# Patient Record
Sex: MALE | Race: WHITE | NOT HISPANIC OR LATINO | Employment: UNEMPLOYED | URBAN - METROPOLITAN AREA
[De-identification: names, ages, dates, MRNs, and addresses within clinical notes are randomized per-mention and may not be internally consistent; named-entity substitution may affect disease eponyms.]

---

## 2017-06-13 ENCOUNTER — ALLSCRIPTS OFFICE VISIT (OUTPATIENT)
Dept: OTHER | Facility: OTHER | Age: 62
End: 2017-06-13

## 2017-06-13 DIAGNOSIS — E78.5 HYPERLIPIDEMIA: ICD-10-CM

## 2017-06-13 DIAGNOSIS — Z00.00 ENCOUNTER FOR GENERAL ADULT MEDICAL EXAMINATION WITHOUT ABNORMAL FINDINGS: ICD-10-CM

## 2017-11-16 ENCOUNTER — ALLSCRIPTS OFFICE VISIT (OUTPATIENT)
Dept: OTHER | Facility: OTHER | Age: 62
End: 2017-11-16

## 2017-11-18 NOTE — PROGRESS NOTES
Assessment    1  BPH associated with nocturia (600 01,788 43) (N40 1,R35 1)   2  Hematuria (599 70) (R31 9)   3  Ureterocele (593 89) (N28 89)   4  Encounter for prostate cancer screening (V76 44) (Z12 5)    Plan  Encounter for prostate cancer screening, Ureterocele    · (1) PSA (SCREEN) (Dx V76 44 Screen for Prostate Cancer); Status:Active; Requestedfor:52Yrw0891; Perform:[address]; DFI:[id number];KXAGRVO;QMT:LLIRAZSIGINO for prostate cancer screening, Ureterocele; Ordered By:Marizol Soto;  Ureterocele    · (1) [place]; Status:Active; Requested for:09Rdg8655; Perform:Merged with [facility]; NOO:70VLB7317;KOQZUZI;NQWDIEG By:Darrian Soto;   · US KIDNEY AND BLADDER WITH PVR; Status:Active - Retrospective Authorization; Requested for:Before [id number]; Perform:Merged with [facility]; DFR:[id number]; Last Updated By:Darrian Soto; 11/16/2017 2:09:23 PM;Ordered;Ordered By:Darrian Soto;   · Follow-up visit in 3 months Evaluation and Treatment  Follow-up  Status: Hold For -Scheduling,Retrospective Authorization  Requested for: [id number]   Ordered;Ureterocele; Ordered By: Austin Joseph Performed:  Due: 74AJY5700; Last Updated By: Austin Joseph; 11/16/2017 2:09:23 PM    Discussion/Summary  Discussion Summary:   1  Ureterocele s/p resectionNo recent imaging  Plan will be to obtain an ultrasound and kidney and bladder with PVR and BMP  BPH with lower urinary tract symptomscontinue finasteridethe patient his urinary symptoms become more bothersome to him options would include urolift versus TURP since he cannot tolerate alpha blockers  He is comfortable with this urine at this time and wishes to continue finasteride without further intervention  routine prostate cancer screeningPatient will obtain a PSA in the near future and will be due for OSEAS follow-up examination        Chief Complaint  Chief Complaint Free Text Note Form: Patient presents for BPH, hematuria, nephrolithiasis, ureterocele      History of Present Illness  HPI: Kevin Marr is a 72-year-old male patient of Dr Luz Maria Simons with a history of ureterocele status post resection and BPH presenting for follow up   had imaging revealing left hydroureteronephrosis and what appeared to be a ureterocele within the urinary bladder  He underwent a transurethral resection of the ureterocele by Dr Alfredo Barbosa with a ureteral stent placement (9/22/15)  His stent was removed after that  Cystoscopy had been notable for large prostate with lateral lobe hyperplasia  Patient was intolerant to tamsulosin for lower urinary tract symptoms given hypotension, thereby has been managed on finasteride for at least the past 2 years for management of urinary symptoms  Patient had postoperative imaging which was negative for any residual hydronephrosis  has not had any recent imaging of his kidney or bladder  He denies any flank pain, gross hematuria, dysuria, nausea, vomiting, fevers, or chills  feels like is a fair stream, feels empty after urination, and has nocturia 3-4 times nightly  He does notice some mild urgency and hesitancy  He denies any hematuria, dysuria, incontinence  Patient does notice that caffeine exacerbates his nocturia  Overall he is not bothered by his urinary symptoms at this time  He denies any urinary tract infections  Review of Systems  Complete-Male Urology:  Constitutional: No fever or chills, feels well, no tiredness, no recent weight gain or weight loss  Respiratory: No complaints of shortness of breath, no wheezing, no cough, no SOB on exertion, no orthopnea or PND  Cardiovascular: No complaints of slow heart rate, no fast heart rate, no chest pain, no palpitations, no leg claudication, no lower extremity  Gastrointestinal: No complaints of abdominal pain, no constipation, no nausea or vomiting, no diarrhea or bloody stools    Genitourinary: urinary hesitancy,-- Empty sensation,-- feelings of urinary urgency-- and-- stream quality fair, but-- no dysuria,-- no hematuria-- and-- no incontinence--   The patient presents with complaints of nocturia (3-4 times)  Musculoskeletal: No complaints of arthralgia, no myalgias, no joint swelling or stiffness, no limb pain or swelling  Integumentary: No complaints of skin rash or skin lesions, no itching, no skin wound, no dry skin  Hematologic/Lymphatic: No complaints of swollen glands, no swollen glands in the neck, does not bleed easily, no easy bruising  Neurological: No compliants of headache, no confusion, no convulsions, no numbness or tingling, no dizziness or fainting, no limb weakness, no difficulty walking  ROS Reviewed:   ROS reviewed  Active Problems  1  Anemia (285 9) (D64 9)   2  Arteriosclerosis of both carotid arteries (433 10,433 30) (I65 23)   3  BPH associated with nocturia (600 01,788 43) (N40 1,R35 1)   4  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)   5  COPD (chronic obstructive pulmonary disease) with emphysema (492 8) (J43 9)   6  Former smoker (V15 82) (S73 777)   7  Hematuria (599 70) (R31 9)   8  Hemorrhoids (455 6) (K64 9)   9  Hydronephrosis (591) (N13 30)   10  Hyperlipidemia (272 4) (E78 5)   11  Hypokalemia (276 8) (E87 6)   12  Inguinal hernia (550 90) (K40 90)   13  Nephrolithiasis (592 0) (N20 0)   14  Paroxysmal atrial fibrillation (427 31) (I48 0)   15  Shortness of breath (786 05) (R06 02)   16  Tobacco abuse (305 1) (Z72 0)   17  Ureterocele (593 89) (N28 89)   18  Ureterocele, congenital (753 23) (Q62 31)    Past Medical History  1  History of Hernia (553 9) (K46 9)   2  History of constipation (V12 79) (Z87 19)   3  History of renal calculi (V13 01) (Z87 442)   4  History of Lung nodule (793 11) (R91 1)   5  History of Sexual dysfunction (302 70) (R37)  Active Problems And Past Medical History Reviewed: The active problems and past medical history were reviewed and updated today  Surgical History  1   History of Closed Treatment Of Orbital Fracture (Non-'blowout')   2  History of Facial Surgery   3  History of Surgery Vas Deferens Vasectomy   4  History of Tonsillectomy  Surgical History Reviewed: The surgical history was reviewed and updated today  Family History  Mother    1  Family history of cerebrovascular accident (V17 1) (Z82 3)   2  Family history of diabetes mellitus (V18 0) (Z83 3)   3  Family history of hypertension (V17 49) (Z82 49)   4  Family history of skin cancer (V16 8) (Z80 8)   5  Family history of Heart problem  Father    6  Family history of   Sister    9  Family history of scoliosis (V17 89) (Z82 69)  Family History Reviewed: The family history was reviewed and updated today  Social History     · Denied: History of Alcohol use   · Current every day smoker (305 1) (F17 200)   · Former smoker (C63 54) (Q43 136)   ·   Social History Reviewed: The social history was reviewed and is unchanged  Current Meds   1  Aspirin 81 MG TABS; Take 1 tablet daily Recorded   2  Finasteride 5 MG Oral Tablet; Take 1 tablet daily; Therapy: 50IZA0020 to (Evaluate:69Ysq4740)  Requested for: 67DLL8657; Last Rx:35Fuo0801 Ordered   3  Multi-Vitamins TABS; Therapy: (Recorded:47Brk7618) to Recorded  Medication List Reviewed: The medication list was reviewed and updated today  Allergies  1  No Known Drug Allergies  2  Shellfish    Vitals  Vital Signs    Recorded: 56MXV0588 01:43PM   Heart Rate 72   Systolic 569   Diastolic 68   Height 5 ft 10 in   Weight 142 lb 8 oz   BMI Calculated 20 45   BSA Calculated 1 81       Physical Exam   Constitutional  General appearance: No acute distress, well appearing and well nourished  Pulmonary  Respiratory effort: No increased work of breathing or signs of respiratory distress     Cardiovascular  Examination of extremities for edema and/or varicosities: Normal    Musculoskeletal  Gait and station: Normal    Skin  Skin and subcutaneous tissue: Normal without rashes or lesions  Additional Exam:  no focal neurologic deficits  Mood and affect appropriate  Results/Data  AUA Symptom Score 80LGS0486 01:45PM User, Ahs     Test Name Result Flag Reference   AUA Symptom Score (for prostate disease) 7       Incomplete emptying: Not at all (0) Frequency: Less than 1 time in 5 (1) Intermittency: Not at all (0) Urgency: Less than 1 time in 5 (1) Weak-stream: Not at all (0) Straining: Less than 1 time in 5 (1) Nocturia: More than half the time (4)   AUA Symptom Score (for prostate disease) - Quality of Life Due to Urinary Symptoms Pleased     AUA Symptom Score (for prostate disease) - Score Category Mild         Future Appointments    Date/Time Provider Specialty Site   02/21/2018 01:00 PM [address] [place] [address] AT [facility] Urology [facility]OLOG[facility]   12/05/2017 12:00 PM DELFINA Parkinson   Cardiology [facility] CARDIOLOGY [facility]   Electronically signed by : Emiliano Tavarez, ; Nov 16 2017  3:42PM EST                       (Author)    Electronically signed by : DELFINA Simon ; Nov 17 2017  6:45AM EST                       (Author)

## 2017-12-05 ENCOUNTER — ALLSCRIPTS OFFICE VISIT (OUTPATIENT)
Dept: OTHER | Facility: OTHER | Age: 62
End: 2017-12-05

## 2017-12-06 NOTE — PROGRESS NOTES
Assessment  Assessed    1  Paroxysmal atrial fibrillation (427 31) (I48 0)   2  Hyperlipidemia (272 4) (E78 5)   3  Tobacco abuse (305 1) (Z72 0)   4  Arteriosclerosis of both carotid arteries (433 10,433 30) (T69 52)    Discussion/Summary  Cardiology Discussion Summary Free Text Note Form [facility]:   PAF - I have had a detailed discussion with the pt and his spouse about atrial fibrillation, its causes, rate versus rhythm control, stroke vs bleeding risk, Coumadin versus newer anticoagulants etc  Multiple questions were answered  Pt's CHADS2 score is 0 and he is on ASA 81 mg daily  No rate control meds was started as patient is occasionally bradycardic at baseline and also because his episodes of PAF were all with controlled ventricular response and also he tends to have low BP as well  u/s (Dec 2016) - <50% stenosisasked to start aspirin and statin  He started aspirin but not statin  LDL not at goal  Again asked him to start statin   he refused stating he will do tighter diet control first   - wants to try diet control for a longer period  risk of MI, CVA etc again discussed  counseled to stop smoking  monitoring (Nov 2015) - heart rate was within 61-68 bpm  No evidence of bradycardia or sinus pauses or A fib was seen  aggressive risk factor modification and therapeutic lifestyle changes  Low salt, low calorie, low fat, low cholesterol diet with regular exercise and to optimize weight  I will defer the ordering and monitoring of necessary lab studies to you, but I am available and happy to review and manage any of that data at your request in the future  concepts of atherosclerosis, including signs and symptoms of cardiac disease  studies were reviewed  measures were reviewed  were entertained and answered  was advised to report any problem requiring medical attention  up with appropriate specialists and lab work as discussed  for follow up visit as scheduled or earlier, if needed  you for allowing me to participate in the care and evaluation of your patient  Should you have any questions, please feel free to contact me  Goals and Barriers: The patient has the current Goals: Stop smokinglipid panel to goal  The patent has the current Barriers: Smoking  Patient's Capacity to Self-Care: Patient is able to Self-Care  Medication SE Review and Pt Understands Tx: Possible side effects of new medications were reviewed with the patient/guardian today  The treatment plan was reviewed with the patient/guardian  The patient/guardian understands and agrees with the treatment plan    Counseling Documentation With Imm: The patient was counseled regarding diagnostic results,-- instructions for management,-- risk factor reductions,-- patient and family education,-- risks and benefits of treatment options  Chief Complaint  Chief Complaint Chronic Condition [facility]: Patient is here today for follow up of chronic conditions described in HPI  History of Present Illness  Cardiology HPI Free Text Note Form [facility]: Patient comes for follow-up and states that he is doing well cardiac wise  He denies chest pain/pressure/tightness, palpitations, lightheadedness, syncope, swelling feet, orthopnea, PND, claudication  States always has sob as he has COPD and he continues to smoke  his atrial fibrillation he is on aspirin 81 mg daily  No rate control meds was started as patient is occasionally bradycardic at baseline and also because his episodes of PAF were all with controlled ventricular response and also he tends to have low BP as well  his HLP and carotid arteriosclerosis, he was started on statin but patient stopped taking it and states he is trying to control his diet first prior to starting a statin  Latest LDL at 138  Patient asked to resume a statin but he refused as he states he still wants to work on his diet first  - On September 13, 2015 patient went to [facility] for nausea vomiting and abdominal pain due to nephrolithiasis   There he was found to be in a self limiting episode of atrial fibrillation  Later that month he was admitted again to [facility] but no atrial fibrillation was found  In October 2015 he went to [address] for the same renal colic and at that time he had an episode of atrial fibrillation  In October 2015 he had an episode of dizziness followed by syncope at his home  He was admitted at [facility] and it was thought that the syncope was secondary to dehydration  echocardiogram August 2015 - normal LVEF, no significant valvular pathologynuclear stress test October 2015 - showed no evidence of reversible coronary ischemiaduplex October 2015 - B/L atherosclerotic plaquing with < 50% stenosis      Review of Systems  Cardiology Male ROS:    Cardiac: No complaints of chest pain, no palpitations, no fainiting  Skin: No complaints of nonhealing sores or skin rash  Genitourinary: No complaints of recurrent urinary tract infections, frequent urination at night, difficult urination, blood in urine, kidney stones, loss of bladder control, no kidney or prostate problems, no erectile dysfunction  Psychological: No complaints of feeling depressed, anxiety, panic attacks, or difficulty concentrating  General: No complaints of trouble sleeping, lack of energy, fatigue, appetite changes, weight changes, fever, frequent infections, or night sweats  Respiratory: as noted in HPI  HEENT: No complaints of serious problems, hearing problems, nose problems, throat problems, or snoring  Gastrointestinal: No complaints of liver problems, nausea, vomiting, heartburn, constipation, bloody stools, diarrhea, problems swallowing, adbominal pain, or rectal bleeding  Hematologic: No complaints of bleeding disorders, anemia, blood clots, or excessive brusing  Neurological: No complaints of numbness, tingling, dizziness, weakness, seizures, headaches, syncope or fainting, AM fatigue, daytime sleepiness, no witnessed apnea episodes  Musculoskeletal: No complaints of arthritis, back pain, or painfull swelling  ROS Reviewed:   ROS reviewed  Active Problems  Problems    1  Anemia (285 9) (D64 9)   2  Arteriosclerosis of both carotid arteries (433 10,433 30) (I65 23)   3  BPH associated with nocturia (600 01,788 43) (N40 1,R35 1)   4  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)   5  COPD (chronic obstructive pulmonary disease) with emphysema (492 8) (J43 9)   6  Encounter for prostate cancer screening (V76 44) (Z12 5)   7  Former smoker (V15 82) (Z88 069)   8  Hematuria (599 70) (R31 9)   9  Hemorrhoids (455 6) (K64 9)   10  Hydronephrosis (591) (N13 30)   11  Hyperlipidemia (272 4) (E78 5)   12  Hypokalemia (276 8) (E87 6)   13  Inguinal hernia (550 90) (K40 90)   14  Nephrolithiasis (592 0) (N20 0)   15  Paroxysmal atrial fibrillation (427 31) (I48 0)   16  Shortness of breath (786 05) (R06 02)   17  Tobacco abuse (305 1) (Z72 0)   18  Ureterocele (593 89) (N28 89)   19  Ureterocele, congenital (753 23) (Q62 31)    Past Medical History  Problems    1  History of Hernia (553 9) (K46 9)   2  History of constipation (V12 79) (Z87 19)   3  History of renal calculi (V13 01) (Z87 442)   4  History of Lung nodule (793 11) (R91 1)   5  History of Sexual dysfunction (302 70) (R37)  Active Problems And Past Medical History Reviewed: The active problems and past medical history were reviewed and updated today  Surgical History  Problems    1  History of Closed Treatment Of Orbital Fracture (Non-'blowout')   2  History of Facial Surgery   3  History of Surgery Vas Deferens Vasectomy   4  History of Tonsillectomy  Surgical History Reviewed: The surgical history was reviewed and updated today  Family History  Mother    1  Family history of cerebrovascular accident (V17 1) (Z82 3)   2  Family history of diabetes mellitus (V18 0) (Z83 3)   3  Family history of hypertension (V17 49) (Z82 49)   4  Family history of skin cancer (V16 8) (Z80 8)   5  Family history of Heart problem  Father    6  Family history of   Sister    9  Family history of scoliosis (V17 89) (Z82 69)  Family History Reviewed: The family history was reviewed and updated today  Social History  Problems    · Denied: History of Alcohol use   · Current every day smoker (305 1) (F17 200)   · Former smoker (A24 59) (A44 180)   ·   Social History Reviewed: The social history was reviewed and updated today  The social history was reviewed and is unchanged  Current Meds   1  Aspirin 81 MG TABS; Take 1 tablet daily Recorded   2  Finasteride 5 MG Oral Tablet; Take 1 tablet daily; Therapy: 85TMB2345 to (Evaluate:74Rwh7607)  Requested for: 98HXB2043; Last Rx:39Nrr1523 Ordered   3  Multi-Vitamins TABS; Therapy: (Recorded:41Zdr3451) to Recorded  Medication List Reviewed: The medication list was reviewed and updated today  Allergies  Medication    1  No Known Drug Allergies  Non-Medication    2  Shellfish    Vitals  Vital Signs    Recorded: 86IAJ9961 11:53AM   Heart Rate 81   Systolic 028   Diastolic 68   Height 5 ft 10 in   Weight 141 lb    BMI Calculated 20 23   BSA Calculated 1 8   O2 Saturation 97       Physical Exam   Constitutional  General appearance: No acute distress, well appearing and well nourished  Eyes  Conjunctiva and Sclera examination: Conjunctiva pink, sclera anicteric  Ears, Nose, Mouth, and Throat - External inspection of ears and nose: Normal without deformities or discharge  -- Oropharynx: Clear, nares are clear, mucous membranes are moist   Neck  Neck and thyroid: Normal, supple, trachea midline, no thyromegaly  Pulmonary  Respiratory effort: No increased work of breathing or signs of respiratory distress  Auscultation of lungs: Clear to auscultation, no rales, no rhonchi, no wheezing, good air movement  Cardiovascular  Palpation of heart: Normal PMI, no thrills  Auscultation of heart: Normal rate and rhythm, normal S1 and S2, no murmurs  Carotid pulses: Normal, 2+ bilaterally  Examination of extremities for edema and/or varicosities: Normal    Chest - Chest: Normal   Abdomen  Abdomen: Non-tender and no distention  Liver and spleen: No hepatomegaly or splenomegaly  Musculoskeletal Gait and station: Normal gait  -- Digits and nails: Normal without clubbing or cyanosis  -- Inspection/palpation of joints, bones, and muscles: Normal, ROM normal    Skin - Skin and subcutaneous tissue: Normal without rashes or lesions  Skin is warm and well perfused, normal turgor     Neurologic - Speech: Normal    Psychiatric - Orientation to person, place, and time: Normal -- Mood and affect: Normal       Future Appointments    Date/Time Provider Specialty Site   02/21/2018 01:00 PM [facility] Urology [address] [place] [address]       Signatures   Electronically signed by : DELFINA Vanegas ; Dec  5 2017 12:31PM EST                       (Author)

## 2017-12-11 ENCOUNTER — TRANSCRIBE ORDERS (OUTPATIENT)
Dept: ADMINISTRATIVE | Facility: HOSPITAL | Age: 62
End: 2017-12-11

## 2017-12-11 DIAGNOSIS — N28.89 URETERAL FISTULA: Primary | ICD-10-CM

## 2017-12-21 ENCOUNTER — GENERIC CONVERSION - ENCOUNTER (OUTPATIENT)
Dept: INTERNAL MEDICINE CLINIC | Facility: CLINIC | Age: 62
End: 2017-12-21

## 2018-01-13 VITALS
DIASTOLIC BLOOD PRESSURE: 62 MMHG | HEIGHT: 70 IN | BODY MASS INDEX: 20.19 KG/M2 | SYSTOLIC BLOOD PRESSURE: 104 MMHG | OXYGEN SATURATION: 97 % | HEART RATE: 70 BPM | WEIGHT: 141 LBS

## 2018-01-13 VITALS
HEIGHT: 70 IN | DIASTOLIC BLOOD PRESSURE: 68 MMHG | SYSTOLIC BLOOD PRESSURE: 102 MMHG | BODY MASS INDEX: 20.4 KG/M2 | WEIGHT: 142.5 LBS | HEART RATE: 72 BPM

## 2018-01-13 NOTE — RESULT NOTES
PFT Results v2:   Diagnosis/Reason For Study: COPD with emphysema   Referring Provider: Jacqueline Anton   Spirometry: Forced vital capacity: 5 74L and 119% Predicted Values  Forced expiratory volume in one second: 4 61L and 126% Predicted Value  FEV1/FVC ratio is 105% Predicted Values  Post Bronchodilator Spirometry: Forced vital capacity : 5 47L and 114% Predicted Values  Forced expiratory volume in one second : 4 64L and 127% Predicted Value  FEV1/FVC ratio is 111% Predicted Values  Lung Volumes: Total lung capacity : 7 81L and 111% Predicted Values  RV: 83% Predicted Values  RV/T% Predicted Values  DLCO:   DLCO 91% Predicted Values  PFT Interpretation:   Patient had a full lung function testing with spirometry lung volumes and DLCO  Patient gave a good effort  The flow volume curve is normal   There is no obstructive or restrictive ventilatory limitation  The lung volumes and DLCO are normal   Clinical correlation required  Future Appointments    Date/Time Provider Specialty Site   2016 02:45 PM DELFINA Collier  Pulmonary [address]   2016 02:00 PM DELFINA Harry   Cardiology [facility] CARDIOLOGY [facility]      Electronically signed by : DELFINA Prado ; 2016  5:05PM EST                       (Author)

## 2018-01-17 NOTE — PROGRESS NOTES
Assessment    1  COPD (chronic obstructive pulmonary disease) with emphysema (492 8) (J43 9)   2  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)   3  Former smoker (V15 82) (W10 288)   4  Shortness of breath (786 05) (R06 02)    Plan  COPD (chronic obstructive pulmonary disease) with emphysema    · Follow-up visit in 6 months Evaluation and Treatment  Follow-up  Status: Hold For -  Scheduling  Requested for: 27Jan2016   Ordered; For: COPD (chronic obstructive pulmonary disease) with emphysema;  Ordered By: Elli Reilly Performed:  Due: [id number]    Discussion/Summary  Discussion Summary:   Patient is a very pleasant 64-year-old gentleman with greater than 35 pack is smoking history still actively smoking until a month ago, states he has only smoked a few cigarettes since then, who was in the hospital at [facility] in October 2015 for a urethral stent that was placed, when he had a CT of the abdomen done, which showed a 7 mm lung nodule in the left lower lobe for which he was referred  He does not complaining of shortness of breath or dyspnea on exertion, able to do his daily activities as usual  Has cough with white mucoid productive sputum no hemoptysis  Works in maintenance, 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  Recently diagnosed with intermittent A  fib not on any medications  He is here for followup with a CT of the chest and PFT  CT of the chest report and images discussed with the patient with evidence of bilateral emphysema  Also has a right apical bullous emphysema  The left lower lobe 7 mm lung nodule has completely resolved possibly inflammatory  There are no other nodules or any spiculated masses  Etiology pathogenesis of emphysema discussed with the patient  Also discussed the risk for the bullous emphysema for a pneumothorax  We'll followup with a CT of the chest in one year for a CT of the chest for lung cancer screening as well as for following up with the size of the bullous emphysema, also given his prior exposure to asbestos, and 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 of the chest in one year  PFT with bronchodilators lung volumes and DLCO report discussed with the patient with no evidence of any obstructive or restrictive ventilatory limitation with normal DLCO  Currently need for bronchodilators, given no shortness of breath or dyspnea on exertion, no limitation of any current activities  He will call if that is any worsening symptoms  Vaccinations up-to-date  We'll followup in 6 months or when necessary earlier as needed  Medication SE Review and Pt Understands Tx: Possible side effects of new medications were reviewed with the patient/guardian today  The treatment plan was reviewed with the patient/guardian  The patient/guardian understands and agrees with the treatment plan   Counseling Documentation With Imm: The patient was counseled regarding diagnostic results, instructions for management, risk factor reductions, risks and benefits of treatment options, importance of compliance with treatment  Active Problems    1  Anemia (285 9) (D64 9)   2  BPH associated with nocturia (600 01,788 43) (N40 1,R35 1)   3  COPD (chronic obstructive pulmonary disease) with emphysema (492 8) (J43 9)   4  Hematuria (599 70) (R31 9)   5  Hemorrhoids (455 6) (K64 9)   6  Hydronephrosis (591) (N13 30)   7  Hypokalemia (276 8) (E87 6)   8  Inguinal hernia (550 90) (K40 90)   9  Nephrolithiasis (592 0) (N20 0)   10  Paroxysmal atrial fibrillation (427 31) (I48 0)   11  Shortness of breath (786 05) (R06 02)   12  Ureterocele (593 89) (N28 89)   13   Ureterocele, congenital (753 23) (Q62 31)    Chief Complaint  Chief Complaint Free Text Note Form: Patient is here for followup      History of Present Illness  HPI: Patient is a very pleasant 20-year-old gentleman with greater than 35 pack is smoking history still actively smoking until a month ago, states he has only smoked a few cigarettes since then, who was in the hospital at 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  Recently diagnosed with intermittent A  fib not on any medications  He is here for followup with a CT of the chest and PFT      Review of Systems  Complete-Male Pulm:   Constitutional: No fever or chills, feels well, no tiredness, no recent weight gain or weight loss, no fever, not feeling poorly, no recent weight gain, no chills, not feeling tired and no recent weight loss  Eyes: no complaints of vision problems  ENT: no rhinitis, no PND, no epistaxis  Cardiovascular: no palpitations, no chest pain  Respiratory: shortness of breath during exertion, but as noted in HPI, no shortness of breath, no cough, no orthopnea, no wheezing and no PND  Gastrointestinal: no complaints of esophageal reflux, no abdominal pain  Musculoskeletal: no arthralgias, no joint swelling, no myalgias  Endocrine: Negative  Hematologic/Lymphatic: no complaints of swollen glands  Past Medical History    1  History of Hernia (553 9) (K46 9)   2  History of constipation (V12 79) (Z87 19)   3  History of renal calculi (V13 01) (Z87 442)   4  History of Lung nodule (793 11) (R91 1)   5  History of Sexual dysfunction (302 70) (R37)   6  History of Tobacco abuse (305 1) (Z72 0)    Surgical History    1  History of Closed Treatment Of Orbital Fracture (Non-'blowout')   2  History of Facial Surgery   3  History of Surgery Vas Deferens Vasectomy   4  History of Tonsillectomy  Surgical History Reviewed: The surgical history was reviewed and updated today  Family History    1   Family history of cerebrovascular accident (V17 1) (Z82 3)   2  Family history of diabetes mellitus (V18 0) (Z83 3)   3  Family history of hypertension (V17 49) (Z82 49)   4  Family history of skin cancer (V16 8) (Z80 8)   5  Family history of Heart problem    6  Family history of     7  Family history of scoliosis (V17 89) (Z82 69)  Family History Reviewed: The family history was reviewed and updated today  Social History    · Denied: History of Alcohol use   · Current every day smoker (305 1) (F17 200)   · 1PPD   · Former smoker (K21 82) (T66 466)   ·   Social History Reviewed: The social history was reviewed and updated today  The social history was reviewed and is unchanged  Current Meds   1  Aspirin 81 MG Oral Tablet; Take 1 tablet daily Recorded   2  Colace 100 MG Oral Capsule; TAKE 1 CAPSULE Daily Recorded   3  Finasteride 5 MG Oral Tablet; TAKE 1 TABLET DAILY AS DIRECTED; Therapy: [id number] to (Evaluate:2016)  Requested for: 20ODI3715; Last   Rx:2015 Ordered   4  Multi-Vitamins TABS; Therapy: (Recorded:40Cep0403) to Recorded  Medication List Reviewed: The medication list was reviewed and updated today  Allergies    1  No Known Drug Allergies    2  Shellfish    Vitals  Vital Signs [Data Includes: Current Encounter]    Recorded: 81WEF7901 02:52PM   Temperature 97 9 F   Heart Rate 56   Systolic 117   Diastolic 74   Height 5 ft 9 5 in   Weight 148 lb 2 08 oz   BMI Calculated 21 56   BSA Calculated 1 83   O2 Saturation 96     Physical Exam    Constitutional   General appearance: No acute distress, well appearing and well nourished  Ears, Nose, Mouth, and Throat   External inspection of ears and nose: Normal     Nasal mucosa, septum, and turbinates: Normal without edema or erythema  Oropharynx: Normal with no erythema, edema, exudate or lesions  Neck   Neck: Supple, symmetric, trachea midline, no masses      Jugular veins: Normal     Pulmonary   Chest: Normal     Respiratory effort: No increased work of breathing or signs of respiratory distress  Auscultation of lungs: Clear to auscultation, no rales, no crackles, no wheezing  Cardiovascular   Auscultation of heart: Normal rate and rhythm, normal S1 and S2, no murmurs  Examination of extremities for edema and/or varicosities: Normal     Abdomen   Abdomen: Soft, non-tender  Lymphatic   Palpation of lymph nodes in neck: No lymphadenopathy  Musculoskeletal   Gait and station: Normal     Neurologic   Mental Status: Normal  Not confused, no evidence of dementia, good comprehension, good concentration  Skin   Skin and subcutaneous tissue: Limited exam shows no rash  Psychiatric   Orientation to person, place and time: Normal        Future Appointments    Date/Time Provider Specialty Site   07/27/2016 02:45 PM DELFINA Lucero  Pulmonary [address]   06/01/2016 02:00 PM DELFINA Wheatley   Cardiology [facility] CARDIOLOGY [facility]     Signatures   Electronically signed by : DELFINA Tesfaye ; Jan 27 2016  4:05PM EST                       (Author)

## 2018-01-23 VITALS
OXYGEN SATURATION: 97 % | WEIGHT: 141 LBS | DIASTOLIC BLOOD PRESSURE: 68 MMHG | SYSTOLIC BLOOD PRESSURE: 110 MMHG | BODY MASS INDEX: 20.19 KG/M2 | HEART RATE: 81 BPM | HEIGHT: 70 IN

## 2018-02-06 ENCOUNTER — APPOINTMENT (OUTPATIENT)
Dept: LAB | Facility: HOSPITAL | Age: 63
End: 2018-02-06
Payer: COMMERCIAL

## 2018-02-06 ENCOUNTER — HOSPITAL ENCOUNTER (OUTPATIENT)
Dept: ULTRASOUND IMAGING | Facility: HOSPITAL | Age: 63
Discharge: HOME | End: 2018-02-06
Attending: UROLOGY
Payer: COMMERCIAL

## 2018-02-06 DIAGNOSIS — N28.89 URETERAL FISTULA: ICD-10-CM

## 2018-02-06 DIAGNOSIS — Z12.5 ENCOUNTER FOR SCREENING FOR MALIGNANT NEOPLASM OF PROSTATE: ICD-10-CM

## 2018-02-06 DIAGNOSIS — N28.89 OTHER SPECIFIED DISORDERS OF KIDNEY AND URETER: ICD-10-CM

## 2018-02-06 LAB
ANION GAP SERPL CALCULATED.3IONS-SCNC: 9 MMOL/L (ref 4–13)
BUN SERPL-MCNC: 20 MG/DL (ref 5–25)
CALCIUM SERPL-MCNC: 9 MG/DL (ref 8.3–10.1)
CHLORIDE SERPL-SCNC: 103 MMOL/L (ref 100–108)
CO2 SERPL-SCNC: 29 MMOL/L (ref 21–32)
CREAT SERPL-MCNC: 0.87 MG/DL (ref 0.6–1.3)
GFR SERPL CREATININE-BSD FRML MDRD: 93 ML/MIN/1.73SQ M
GLUCOSE P FAST SERPL-MCNC: 99 MG/DL (ref 65–99)
POTASSIUM SERPL-SCNC: 4 MMOL/L (ref 3.5–5.3)
SODIUM SERPL-SCNC: 141 MMOL/L (ref 136–145)

## 2018-02-06 PROCEDURE — 36415 COLL VENOUS BLD VENIPUNCTURE: CPT

## 2018-02-06 PROCEDURE — 80048 BASIC METABOLIC PNL TOTAL CA: CPT

## 2018-02-06 PROCEDURE — 51798 US URINE CAPACITY MEASURE: CPT

## 2018-02-21 ENCOUNTER — OFFICE VISIT (OUTPATIENT)
Dept: UROLOGY | Facility: CLINIC | Age: 63
End: 2018-02-21
Payer: COMMERCIAL

## 2018-02-21 VITALS
BODY MASS INDEX: 21.03 KG/M2 | SYSTOLIC BLOOD PRESSURE: 104 MMHG | DIASTOLIC BLOOD PRESSURE: 64 MMHG | HEIGHT: 69 IN | WEIGHT: 142 LBS | HEART RATE: 66 BPM

## 2018-02-21 DIAGNOSIS — N28.89 URETEROCELE: Primary | ICD-10-CM

## 2018-02-21 PROCEDURE — 99213 OFFICE O/P EST LOW 20 MIN: CPT | Performed by: PHYSICIAN ASSISTANT

## 2018-02-21 RX ORDER — FINASTERIDE 5 MG/1
5 TABLET, FILM COATED ORAL DAILY
COMMUNITY
End: 2018-04-27 | Stop reason: SDUPTHER

## 2018-02-21 NOTE — PROGRESS NOTES
1  Ureterocele             Assessment and plan:       1  Left ureterocele s/p transurethral resection of ureterocele 9/2015 -- managed by Dr Diego Levin  - no hydronephrosis present  No evidence of postrenal distraction  - no further surveillance warranted at this time  Patient can be discharged back to his primary care provider  He can follow up with urology on is an as-needed basis  He is aware to contact us in the future if any urologic concern  2  BPH with LUTS  - symptoms managed well on finasteride  He will continue to take this medication once daily  Yolis Casarez PA-C      Chief Complaint     Chief Complaint   Patient presents with    Benign Prostatic Hypertrophy    Hematuria         History of Present Illness     Shannon Holley is a 65 y o  male patient of Dr Diego Levin with a history of ureterocele status post resection, and BPH presenting for follow-up    Patient previously had imaging revealing left hydroureteronephrosis and what appeared to be a ureterocele within the urinary bladder  He underwent a transurethral resection of the ureterocele by Dr Mc Ray with a ureteral stent placement (9/22/15)  His stent was removed after that  Cystoscopy had been notable for large prostate with lateral lobe hyperplasia  Patient was intolerant to tamsulosin for lower urinary tract symptoms given hypotension, thereby has been managed on finasteride for at least the past 2 years for management of urinary symptoms  Patient had postoperative imaging which was negative for any residual hydronephrosis  Patient had a recent ultrasound of his kidney and bladder (2/6/18) which was negative for any residual hydronephrosis  Patient did not have any significant postvoid residual urine  Bilateral ureteral jets were detected  Patient was noted to have prostatomegaly  Patient is overall very happy with his urination   He feels like he is strong stream, feels empty after urination, has nocturia 2-3 times nightly  Denies any dysuria, urgency, hesitancy, incontinence, urinary tract infections, flank pain, fevers, or chills  Laboratory     Lab Results   Component Value Date    CREATININE 0 87 02/06/2018       Review of Systems     Review of Systems   Constitutional: Negative  HENT: Negative  Eyes: Negative  Respiratory: Negative  Cardiovascular: Negative  Gastrointestinal: Negative  Endocrine: Negative  Genitourinary: Negative  Musculoskeletal: Negative  Allergic/Immunologic: Negative  Neurological: Negative  Hematological: Negative  Psychiatric/Behavioral: Negative  Allergies     Allergies   Allergen Reactions    Other Anaphylaxis     Shellfish, shrimp       Physical Exam     Physical Exam   Constitutional: He is oriented to person, place, and time  He appears well-developed and well-nourished  HENT:   Head: Normocephalic and atraumatic  Eyes: Conjunctivae are normal    Neck: Normal range of motion  No tracheal deviation present  Pulmonary/Chest: Effort normal    Genitourinary:   Genitourinary Comments: Prostate: 30g, smooth, symmetric, no nodules   Musculoskeletal: Normal range of motion  He exhibits no edema, tenderness or deformity  Neurological: He is alert and oriented to person, place, and time  Skin: Skin is warm and dry  No rash noted  He is not diaphoretic  No erythema  No pallor  Psychiatric: He has a normal mood and affect   His behavior is normal          Vital Signs     Vitals:    02/21/18 1255   BP: 104/64   BP Location: Left arm   Patient Position: Sitting   Cuff Size: Adult   Pulse: 66   Weight: 64 4 kg (142 lb)   Height: 5' 9" (1 753 m)         Current Medications       Current Outpatient Prescriptions:     finasteride (PROSCAR) 5 mg tablet, Take 5 mg by mouth daily, Disp: , Rfl:       Active Problems     Patient Active Problem List   Diagnosis    Ureterocele    BPH associated with nocturia         Past Medical History     History reviewed  No pertinent past medical history  Surgical History     Past Surgical History:   Procedure Laterality Date    FACIAL COSMETIC SURGERY      KIDNEY SURGERY           Family History     History reviewed  No pertinent family history  Social History     Social History       Radiology     RENAL ULTRASOUND     INDICATION: N28 89: Other specified disorders of kidney and ureter  History taken directly from the electronic ordering system      COMPARISON: None      TECHNIQUE:   Ultrasound of the retroperitoneum was performed with a curvilinear transducer utilizing volumetric sweeps and still imaging techniques       FINDINGS:     KIDNEYS:  Symmetric and normal size      Right kidney:  11 0 x 4 3 cm  Normal echogenicity and contour  No suspicious masses detected  No hydronephrosis  No shadowing calculi  No perinephric fluid collections      Left kidney:  11 3 x 5 7 cm  Normal echogenicity and contour  No suspicious masses detected  No hydronephrosis  No shadowing calculi  No perinephric fluid collections      URETERS:  Nonvisualized      BLADDER:   Normally distended  No focal thickening or mass lesions  Bilateral ureteral jets detected      No significant postvoid residual urine volume      Prostate measures approximately 4 2 x 3 8 x 4 6 cm      IMPRESSION:     No evidence for obstructive uropathy      Prostatomegaly

## 2018-02-26 DIAGNOSIS — N28.89 OTHER SPECIFIED DISORDERS OF KIDNEY AND URETER: ICD-10-CM

## 2018-02-26 DIAGNOSIS — Z12.5 ENCOUNTER FOR SCREENING FOR MALIGNANT NEOPLASM OF PROSTATE: ICD-10-CM

## 2018-04-27 DIAGNOSIS — N40.0 BENIGN PROSTATIC HYPERPLASIA, UNSPECIFIED WHETHER LOWER URINARY TRACT SYMPTOMS PRESENT: Primary | ICD-10-CM

## 2018-04-27 RX ORDER — FINASTERIDE 5 MG/1
5 TABLET, FILM COATED ORAL DAILY
Qty: 30 TABLET | Refills: 6 | Status: SHIPPED | OUTPATIENT
Start: 2018-04-27 | End: 2018-12-18 | Stop reason: SDUPTHER

## 2018-04-27 NOTE — TELEPHONE ENCOUNTER
[facility] patient, managed by Dr Griselda Li, returned call from spouse, patient needs refill of Finasteride to [address], 30 day supply with refills

## 2018-06-20 RX ORDER — ASPIRIN 81 MG/1
81 TABLET ORAL DAILY
COMMUNITY

## 2018-07-05 ENCOUNTER — TELEPHONE (OUTPATIENT)
Dept: INTERNAL MEDICINE CLINIC | Facility: CLINIC | Age: 63
End: 2018-07-05

## 2018-07-05 NOTE — TELEPHONE ENCOUNTER
Dr Nia Jeter called office stating that [facility]ionette has an apt coming up on 7/19/18 he needs an order put in to see Dr Claudia Son

## 2018-12-18 DIAGNOSIS — N40.0 BENIGN PROSTATIC HYPERPLASIA, UNSPECIFIED WHETHER LOWER URINARY TRACT SYMPTOMS PRESENT: ICD-10-CM

## 2018-12-18 RX ORDER — FINASTERIDE 5 MG/1
5 TABLET, FILM COATED ORAL DAILY
Qty: 90 TABLET | Refills: 3 | Status: SHIPPED | OUTPATIENT
Start: 2018-12-18 | End: 2020-01-29 | Stop reason: SDUPTHER

## 2020-01-29 DIAGNOSIS — N40.0 BENIGN PROSTATIC HYPERPLASIA, UNSPECIFIED WHETHER LOWER URINARY TRACT SYMPTOMS PRESENT: ICD-10-CM

## 2020-01-29 RX ORDER — FINASTERIDE 5 MG/1
5 TABLET, FILM COATED ORAL DAILY
Qty: 30 TABLET | Refills: 0 | Status: SHIPPED | OUTPATIENT
Start: 2020-01-29 | End: 2020-02-13 | Stop reason: SDUPTHER

## 2020-01-29 NOTE — TELEPHONE ENCOUNTER
The patient was last seen on 2/21/18 by Mónica Espinoza PA-C in the [facility] location  He is very overdue for an office visit  A 30 day courtesy supply of the medication was queued; the patient needs an office visit before additional refills will be approved  Script was forwarded to the Advanced Practitioner covering the [facility] location for approval     This message will be forwarded to Call Center so they can schedule an appointment

## 2020-01-29 NOTE — TELEPHONE ENCOUNTER
Patient left a message on the Medication Refill voice mail line requesting a new prescription for Finasteride 5mg, 90 day supply to [facility] in [facility]  Patient currently out of medication

## 2020-01-30 RX ORDER — FINASTERIDE 5 MG/1
TABLET, FILM COATED ORAL
Qty: 90 TABLET | Refills: 0 | OUTPATIENT
Start: 2020-01-30

## 2020-01-31 DIAGNOSIS — N40.0 BENIGN PROSTATIC HYPERPLASIA, UNSPECIFIED WHETHER LOWER URINARY TRACT SYMPTOMS PRESENT: ICD-10-CM

## 2020-01-31 RX ORDER — FINASTERIDE 5 MG/1
TABLET, FILM COATED ORAL
Qty: 90 TABLET | Refills: 0 | OUTPATIENT
Start: 2020-01-31

## 2020-02-13 ENCOUNTER — OFFICE VISIT (OUTPATIENT)
Dept: UROLOGY | Facility: CLINIC | Age: 65
End: 2020-02-13

## 2020-02-13 VITALS
HEIGHT: 69 IN | DIASTOLIC BLOOD PRESSURE: 78 MMHG | HEART RATE: 76 BPM | SYSTOLIC BLOOD PRESSURE: 98 MMHG | BODY MASS INDEX: 21.03 KG/M2 | WEIGHT: 142 LBS

## 2020-02-13 DIAGNOSIS — Z12.5 SCREENING FOR PROSTATE CANCER: ICD-10-CM

## 2020-02-13 DIAGNOSIS — N40.0 BENIGN PROSTATIC HYPERPLASIA, UNSPECIFIED WHETHER LOWER URINARY TRACT SYMPTOMS PRESENT: Primary | ICD-10-CM

## 2020-02-13 PROCEDURE — 99213 OFFICE O/P EST LOW 20 MIN: CPT | Performed by: PHYSICIAN ASSISTANT

## 2020-02-13 RX ORDER — FINASTERIDE 5 MG/1
5 TABLET, FILM COATED ORAL DAILY
Qty: 90 TABLET | Refills: 3 | Status: SHIPPED | OUTPATIENT
Start: 2020-02-13 | End: 2021-04-29

## 2020-02-13 NOTE — PROGRESS NOTES
1  Benign prostatic hyperplasia, unspecified whether lower urinary tract symptoms present  finasteride (PROSCAR) 5 mg tablet   2  Screening for prostate cancer  PSA, Total Screen       Assessment and plan:       1  Left ureterocele s/p transurethral resection of ureterocele 9/2015 -- managed by Dr Darius Rivera  - follow-up imaging had been negative for residual hydronephrosis  Patient remains asymptomatic of flank pain or urinary infections  2  BPH with LUTS  - symptoms managed well on finasteride  He will continue to take this medication once daily  3  Prostate cancer screening  - prostate examination normal in the office today  - patient was provided with a PSA to be obtained in the near future  He will be contacted with any abnormal results  Patient has been doing well over the past year  No further surveillance is warranted at this time  Will follow up with Urology on as-needed basis  Encouraged continued routine prostate cancer screening annually with his primary care into the age of 8 konstantin Helton PA-C      Chief Complaint     Chief Complaint   Patient presents with    Benign Prostatic Hypertrophy         History of Present Illness     Li Celis is a 62 y o  male patient of Dr Darius Rivera with a history of ureterocele status post resection, and BPH presenting for follow-up    Patient previously had imaging revealing left hydroureteronephrosis and what appeared to be a ureterocele within the urinary bladder  He underwent a transurethral resection of the ureterocele by Dr Mikey Barraza with a ureteral stent placement (9/22/15)  His stent was removed after that  Cystoscopy had been notable for large prostate with lateral lobe hyperplasia  Patient was intolerant to tamsulosin for lower urinary tract symptoms given hypotension, thereby has been managed on finasteride for at least the past 2 years for management of urinary symptoms   Patient had postoperative imaging which was negative for any residual hydronephrosis  Patient had an ultrasound of his kidney and bladder (2/6/18) which was negative for any residual hydronephrosis  Patient did not have any significant postvoid residual urine  Bilateral ureteral jets were detected  Patient was noted to have prostatomegaly  Patient is overall very happy with his urination  He feels like he is strong stream, feels empty after urination, has nocturia 2-3 times nightly  Denies any dysuria, urgency, hesitancy, incontinence, urinary tract infections, flank pain, fevers, or chills  Patient denies any flank pain  Patient refused urine studies and postvoid residual in the office today  Laboratory     Lab Results   Component Value Date    CREATININE 0 87 02/06/2018       Review of Systems     Review of Systems   Constitutional: Negative  HENT: Negative  Eyes: Negative  Respiratory: Negative  Cardiovascular: Negative  Gastrointestinal: Negative  Endocrine: Negative  Genitourinary: Negative  Musculoskeletal: Negative  Allergic/Immunologic: Negative  Neurological: Negative  Hematological: Negative  Psychiatric/Behavioral: Negative  Allergies     Allergies   Allergen Reactions    Other Anaphylaxis     Shellfish, shrimp       Physical Exam     Physical Exam   Constitutional: He is oriented to person, place, and time  Thin   HENT:   Head: Normocephalic and atraumatic  Eyes: Conjunctivae are normal    Neck: Normal range of motion  No tracheal deviation present  Pulmonary/Chest: Effort normal    Genitourinary:   Genitourinary Comments: Good rectal tone  Prostate: 35g, smooth, symmetric, no nodules   Musculoskeletal: Normal range of motion  He exhibits no edema, tenderness or deformity  Neurological: He is alert and oriented to person, place, and time  Skin: Skin is warm and dry  No rash noted  He is not diaphoretic  No erythema  No pallor  Psychiatric: He has a normal mood and affect   His behavior is normal          Vital Signs     Vitals:    02/13/20 1307   BP: 98/78   BP Location: Left arm   Patient Position: Sitting   Cuff Size: Standard   Pulse: 76   Weight: 64 4 kg (142 lb)   Height: 5' 9" (1 753 m)         Current Medications       Current Outpatient Medications:     aspirin (ECOTRIN LOW STRENGTH) 81 mg EC tablet, Take 81 mg by mouth daily, Disp: , Rfl:     finasteride (PROSCAR) 5 mg tablet, Take 1 tablet (5 mg total) by mouth daily, Disp: 90 tablet, Rfl: 3      Active Problems     Patient Active Problem List   Diagnosis    Ureterocele    BPH associated with nocturia         Past Medical History     Past Medical History:   Diagnosis Date    Anemia     Atrial fibrillation (HCC)     COPD (chronic obstructive pulmonary disease) (HCC)     Hernia cerebri (HCC)     Hyperlipidemia     Lung nodule     RESOLVED 26 JUN 2016    Renal calculi     Sexual dysfunction          Surgical History     Past Surgical History:   Procedure Laterality Date    FACIAL COSMETIC SURGERY      FACIAL COSMETIC SURGERY      KIDNEY SURGERY      ORBITAL FRACTURE SURGERY      CLOSED TREATMENT OF ORBITAL FRACTURE(NON-BLOWOUT)    TONSILLECTOMY      VASECTOMY           Family History     Family History   Problem Relation Age of Onset    Stroke Mother     Diabetes Mother     Hypertension Mother     Skin cancer Mother     Heart disease Mother     Scoliosis Sister          Social History     Social History       Radiology     RENAL ULTRASOUND     INDICATION: N28 89: Other specified disorders of kidney and ureter  History taken directly from the electronic ordering system      COMPARISON: None      TECHNIQUE:   Ultrasound of the retroperitoneum was performed with a curvilinear transducer utilizing volumetric sweeps and still imaging techniques       FINDINGS:     KIDNEYS:  Symmetric and normal size      Right kidney:  11 0 x 4 3 cm  Normal echogenicity and contour  No suspicious masses detected     No hydronephrosis  No shadowing calculi  No perinephric fluid collections      Left kidney:  11 3 x 5 7 cm  Normal echogenicity and contour  No suspicious masses detected  No hydronephrosis  No shadowing calculi  No perinephric fluid collections      URETERS:  Nonvisualized      BLADDER:   Normally distended  No focal thickening or mass lesions  Bilateral ureteral jets detected      No significant postvoid residual urine volume      Prostate measures approximately 4 2 x 3 8 x 4 6 cm      IMPRESSION:     No evidence for obstructive uropathy      Prostatomegaly

## 2020-04-04 ENCOUNTER — TELEPHONE (OUTPATIENT)
Dept: OTHER | Facility: OTHER | Age: 65
End: 2020-04-04

## 2020-04-04 ENCOUNTER — TELEMEDICINE (OUTPATIENT)
Dept: INTERNAL MEDICINE CLINIC | Facility: CLINIC | Age: 65
End: 2020-04-04

## 2020-04-04 DIAGNOSIS — Z20.828 EXPOSURE TO SARS-ASSOCIATED CORONAVIRUS: Primary | ICD-10-CM

## 2020-04-04 DIAGNOSIS — Z20.828 EXPOSURE TO SARS-ASSOCIATED CORONAVIRUS: ICD-10-CM

## 2020-04-04 PROCEDURE — G2012 BRIEF CHECK IN BY MD/QHP: HCPCS | Performed by: INTERNAL MEDICINE

## 2020-04-04 PROCEDURE — 87635 SARS-COV-2 COVID-19 AMP PRB: CPT

## 2020-04-06 LAB — SARS-COV-2 RNA SPEC QL NAA+PROBE: NOT DETECTED

## 2020-09-11 ENCOUNTER — OFFICE VISIT (OUTPATIENT)
Dept: INTERNAL MEDICINE CLINIC | Facility: CLINIC | Age: 65
End: 2020-09-11

## 2020-09-11 VITALS
WEIGHT: 141 LBS | DIASTOLIC BLOOD PRESSURE: 70 MMHG | RESPIRATION RATE: 14 BRPM | OXYGEN SATURATION: 98 % | SYSTOLIC BLOOD PRESSURE: 112 MMHG | TEMPERATURE: 98 F | HEIGHT: 69 IN | BODY MASS INDEX: 20.88 KG/M2 | HEART RATE: 73 BPM

## 2020-09-11 DIAGNOSIS — M54.42 ACUTE MIDLINE LOW BACK PAIN WITH BILATERAL SCIATICA: Primary | ICD-10-CM

## 2020-09-11 DIAGNOSIS — M54.41 ACUTE MIDLINE LOW BACK PAIN WITH BILATERAL SCIATICA: Primary | ICD-10-CM

## 2020-09-11 PROCEDURE — 99212 OFFICE O/P EST SF 10 MIN: CPT | Performed by: INTERNAL MEDICINE

## 2020-09-11 RX ORDER — METHYLPREDNISOLONE 4 MG/1
TABLET ORAL
Qty: 21 EACH | Refills: 0 | Status: SHIPPED | OUTPATIENT
Start: 2020-09-11 | End: 2020-09-17

## 2020-09-11 RX ORDER — CYCLOBENZAPRINE HCL 5 MG
5 TABLET ORAL
Qty: 20 TABLET | Refills: 1 | Status: SHIPPED | OUTPATIENT
Start: 2020-09-11 | End: 2020-09-21 | Stop reason: HOSPADM

## 2020-09-11 NOTE — PROGRESS NOTES
Assessment/Plan:     Suspect arthritis in his spine  Wanted to do x-rays but he does not have insurance presently  Will try Medrol Dosepak and muscle relaxer at night  Suggested using ice as well  Referral to chiropractor  If not improving, I told him we really should do x-rays to make sure that this is not a cancer to his bones causing the problem  This was explained to him and his wife  Quality Measures:       Return if symptoms worsen or fail to improve  No problem-specific Assessment & Plan notes found for this encounter  Diagnoses and all orders for this visit:    Acute midline low back pain with bilateral sciatica  -     methylPREDNISolone 4 MG tablet therapy pack; Use as directed on package  -     cyclobenzaprine (FLEXERIL) 5 mg tablet; Take 1 tablet (5 mg total) by mouth daily at bedtime          Subjective:      Patient ID: Randal Roblero is a 60 y o  male  Patient comes in today complaining of neck and back pain for at least a week or 2 now  Denies any trauma  He has had back pain in the past   Was really severe yesterday  Trouble sleeping period sometimes pain down the leg on both sides  No pains down the arm  Initially he was trying very high doses of ibuprofen, 600 mg, 3 of those tablets at a time  His wife told him to stop doing that  Luckily, he has no abdominal pain  No urinary problems        ALLERGIES:  Allergies   Allergen Reactions    Other Anaphylaxis     Shellfish, shrimp       CURRENT MEDICATIONS:    Current Outpatient Medications:     aspirin (ECOTRIN LOW STRENGTH) 81 mg EC tablet, Take 81 mg by mouth daily, Disp: , Rfl:     finasteride (PROSCAR) 5 mg tablet, Take 1 tablet (5 mg total) by mouth daily, Disp: 90 tablet, Rfl: 3    cyclobenzaprine (FLEXERIL) 5 mg tablet, Take 1 tablet (5 mg total) by mouth daily at bedtime, Disp: 20 tablet, Rfl: 1    methylPREDNISolone 4 MG tablet therapy pack, Use as directed on package, Disp: 21 each, Rfl: 0    ACTIVE PROBLEM LIST:  Patient Active Problem List   Diagnosis    Ureterocele    BPH associated with nocturia       PAST MEDICAL HISTORY:  Past Medical History:   Diagnosis Date    Anemia     Atrial fibrillation (HCC)     COPD [facility]ch[facility] pulmonary disease) [facility] [address] )     Hernia cerebri (HCC)     Hyperlipidemia     Lung nodule     RESOLVED 26 JUN 2016    Renal calculi     Sexual dysfunction        PAST SURGICAL HISTORY:  Past Surgical History:   Procedure Laterality Date    FACIAL COSMETIC SURGERY      FACIAL COSMETIC SURGERY      KIDNEY SURGERY      ORBITAL FRACTURE SURGERY      CLOSED TREATMENT OF ORBITAL FRACTURE(NON-BLOWOUT)    TONSILLECTOMY      VASECTOMY         FAMILY HISTORY:  Family History   Problem Relation Age of Onset    Stroke Mother     Diabetes Mother     Hypertension Mother     Skin cancer Mother     Heart disease Mother     Scoliosis Sister        SOCIAL HISTORY:  Social History     Socioeconomic History    Marital status: /Civil Union     Spouse name: Not on file    Number of children: Not on file    Years of education: Not on file    Highest education level: Not on file   Occupational History    Not on file   Social Needs    Financial resource strain: Not on file    Food insecurity     Worry: Not on file     Inability: Not on file    Transportation needs     Medical: Not on file     Non-medical: Not on file   Tobacco Use    Smoking status: Current Every Day Smoker     Packs/day: 1 00    Smokeless tobacco: Never Used    Tobacco comment: LAST ASSESSED 27 JAN 2016   Substance and Sexual Activity    Alcohol use: No    Drug use: No    Sexual activity: Not on file   Lifestyle    Physical activity     Days per week: Not on file     Minutes per session: Not on file    Stress: Not on file   Relationships    Social connections     Talks on phone: Not on file     Gets together: Not on file     Attends Sikhism service: Not on file     Active member of club or organization: Not on file     Attends meetings of clubs or organizations: Not on file     Relationship status: Not on file    Intimate partner violence     Fear of current or ex partner: Not on file     Emotionally abused: Not on file     Physically abused: Not on file     Forced sexual activity: Not on file   Other Topics Concern    Not on file   Social History Narrative    Not on file       Review of Systems   Constitutional: Negative for fever  Musculoskeletal: Positive for back pain and neck pain  Objective:  Vitals:    09/11/20 1429   BP: 112/70   BP Location: Left arm   Patient Position: Sitting   Cuff Size: Standard   Pulse: 73   Resp: 14   Temp: 98 °F (36 7 °C)   TempSrc: Tympanic   SpO2: 98%   Weight: 64 kg (141 lb)   Height: 5' 9" (1 753 m)     Body mass index is 20 82 kg/m²  Physical Exam  Vitals signs and nursing note reviewed  Constitutional:       Appearance: Normal appearance  Neck:      Comments: Restricted range of motion  He was able to move his neck but started to hurt across the horizontal planes so he stopped  Musculoskeletal:      Comments: Had a little difficulty getting up off the exam table  No point tenderness in the back  Neurological:      Mental Status: He is alert  RESULTS:    No results found for this or any previous visit (from the past 1008 hour(s))  This note was created with voice recognition software  Phonic, grammatical and spelling errors may be present within the note as a result

## 2020-09-16 ENCOUNTER — TELEPHONE (OUTPATIENT)
Dept: INTERNAL MEDICINE CLINIC | Facility: CLINIC | Age: 65
End: 2020-09-16

## 2020-09-16 ENCOUNTER — HOSPITAL ENCOUNTER (INPATIENT)
Facility: HOSPITAL | Age: 65
LOS: 3 days | Discharge: HOME | DRG: 241 | End: 2020-09-21
Attending: EMERGENCY MEDICINE | Admitting: FAMILY MEDICINE
Payer: COMMERCIAL

## 2020-09-16 DIAGNOSIS — R45.851 SUICIDAL IDEATION: ICD-10-CM

## 2020-09-16 DIAGNOSIS — M54.9 ACUTE BACK PAIN: ICD-10-CM

## 2020-09-16 DIAGNOSIS — R10.13 EPIGASTRIC PAIN: Primary | ICD-10-CM

## 2020-09-16 LAB
ALBUMIN SERPL BCP-MCNC: 3.9 G/DL (ref 3.5–5)
ALP SERPL-CCNC: 62 U/L (ref 46–116)
ALT SERPL W P-5'-P-CCNC: 39 U/L (ref 12–78)
ANION GAP SERPL CALCULATED.3IONS-SCNC: 6 MMOL/L (ref 4–13)
AST SERPL W P-5'-P-CCNC: 18 U/L (ref 5–45)
BASOPHILS # BLD AUTO: 0.09 THOUSANDS/ΜL (ref 0–0.1)
BASOPHILS NFR BLD AUTO: 1 % (ref 0–1)
BILIRUB SERPL-MCNC: 0.4 MG/DL (ref 0.2–1)
BUN SERPL-MCNC: 17 MG/DL (ref 5–25)
CALCIUM SERPL-MCNC: 9.7 MG/DL (ref 8.3–10.1)
CHLORIDE SERPL-SCNC: 102 MMOL/L (ref 100–108)
CO2 SERPL-SCNC: 30 MMOL/L (ref 21–32)
CREAT SERPL-MCNC: 0.88 MG/DL (ref 0.6–1.3)
EOSINOPHIL # BLD AUTO: 0.06 THOUSAND/ΜL (ref 0–0.61)
EOSINOPHIL NFR BLD AUTO: 1 % (ref 0–6)
ERYTHROCYTE [DISTWIDTH] IN BLOOD BY AUTOMATED COUNT: 13.3 % (ref 11.6–15.1)
GFR SERPL CREATININE-BSD FRML MDRD: 91 ML/MIN/1.73SQ M
GLUCOSE SERPL-MCNC: 103 MG/DL (ref 65–140)
HCT VFR BLD AUTO: 45.1 % (ref 36.5–49.3)
HGB BLD-MCNC: 14.5 G/DL (ref 12–17)
IMM GRANULOCYTES # BLD AUTO: 0.04 THOUSAND/UL (ref 0–0.2)
IMM GRANULOCYTES NFR BLD AUTO: 0 % (ref 0–2)
LIPASE SERPL-CCNC: 105 U/L (ref 73–393)
LYMPHOCYTES # BLD AUTO: 2.89 THOUSANDS/ΜL (ref 0.6–4.47)
LYMPHOCYTES NFR BLD AUTO: 33 % (ref 14–44)
MCH RBC QN AUTO: 29.5 PG (ref 26.8–34.3)
MCHC RBC AUTO-ENTMCNC: 32.2 G/DL (ref 31.4–37.4)
MCV RBC AUTO: 92 FL (ref 82–98)
MONOCYTES # BLD AUTO: 0.67 THOUSAND/ΜL (ref 0.17–1.22)
MONOCYTES NFR BLD AUTO: 8 % (ref 4–12)
NEUTROPHILS # BLD AUTO: 5.15 THOUSANDS/ΜL (ref 1.85–7.62)
NEUTS SEG NFR BLD AUTO: 57 % (ref 43–75)
NRBC BLD AUTO-RTO: 0 /100 WBCS
PLATELET # BLD AUTO: 290 THOUSANDS/UL (ref 149–390)
PMV BLD AUTO: 9.4 FL (ref 8.9–12.7)
POTASSIUM SERPL-SCNC: 4.2 MMOL/L (ref 3.5–5.3)
PROT SERPL-MCNC: 8 G/DL (ref 6.4–8.2)
RBC # BLD AUTO: 4.92 MILLION/UL (ref 3.88–5.62)
SODIUM SERPL-SCNC: 138 MMOL/L (ref 136–145)
TROPONIN I SERPL-MCNC: <0.02 NG/ML
WBC # BLD AUTO: 8.9 THOUSAND/UL (ref 4.31–10.16)

## 2020-09-16 PROCEDURE — 99285 EMERGENCY DEPT VISIT HI MDM: CPT | Performed by: EMERGENCY MEDICINE

## 2020-09-16 PROCEDURE — 84484 ASSAY OF TROPONIN QUANT: CPT | Performed by: EMERGENCY MEDICINE

## 2020-09-16 PROCEDURE — 36415 COLL VENOUS BLD VENIPUNCTURE: CPT | Performed by: EMERGENCY MEDICINE

## 2020-09-16 PROCEDURE — 85025 COMPLETE CBC W/AUTO DIFF WBC: CPT | Performed by: EMERGENCY MEDICINE

## 2020-09-16 PROCEDURE — 83690 ASSAY OF LIPASE: CPT | Performed by: EMERGENCY MEDICINE

## 2020-09-16 PROCEDURE — 99285 EMERGENCY DEPT VISIT HI MDM: CPT

## 2020-09-16 PROCEDURE — 80053 COMPREHEN METABOLIC PANEL: CPT | Performed by: EMERGENCY MEDICINE

## 2020-09-16 RX ORDER — LIDOCAINE HYDROCHLORIDE 20 MG/ML
15 SOLUTION OROPHARYNGEAL ONCE
Status: COMPLETED | OUTPATIENT
Start: 2020-09-16 | End: 2020-09-16

## 2020-09-16 RX ORDER — MAGNESIUM HYDROXIDE/ALUMINUM HYDROXICE/SIMETHICONE 120; 1200; 1200 MG/30ML; MG/30ML; MG/30ML
30 SUSPENSION ORAL ONCE
Status: COMPLETED | OUTPATIENT
Start: 2020-09-16 | End: 2020-09-16

## 2020-09-16 RX ORDER — SUCRALFATE ORAL 1 G/10ML
1000 SUSPENSION ORAL ONCE
Status: COMPLETED | OUTPATIENT
Start: 2020-09-16 | End: 2020-09-16

## 2020-09-16 RX ADMIN — LIDOCAINE HYDROCHLORIDE 15 ML: 20 SOLUTION ORAL; TOPICAL at 23:18

## 2020-09-16 RX ADMIN — ALUMINUM HYDROXIDE, MAGNESIUM HYDROXIDE, AND SIMETHICONE 30 ML: 200; 200; 20 SUSPENSION ORAL at 23:18

## 2020-09-16 RX ADMIN — SUCRALFATE 1000 MG: 1 SUSPENSION ORAL at 23:57

## 2020-09-16 NOTE — TELEPHONE ENCOUNTER
Patient is having stomach pain since yesterday  He is belching and has gas  Lee Nayak would like to know if this could be from the medication? He is eating when taking the medication  He is not taking the ibuprofen like he was  She would like to know what they can do for this?     Call back Mp Koch

## 2020-09-16 NOTE — TELEPHONE ENCOUNTER
The pain medicine would be something in the same class as ibuprofen which has already chewed up his stomach  But he should not be taking those while on the steroids  He can take Tylenol,  two Extra Strength up to 3 times a day while on the steroids  Also should try to see a chiropractor if not already scheduled

## 2020-09-16 NOTE — TELEPHONE ENCOUNTER
It can be from the medicine  If he is not already taking Pepcid AC, get that over-the-counter for the stomach  If he is already doing that, switch to Nexium or Prilosec or similar  Probably should stay on that type of medicine for another week or 2 because of all the ibuprofen he was taking

## 2020-09-16 NOTE — TELEPHONE ENCOUNTER
Rosanna Becerril called and left a message asking if Maday Elkins could have something for pain  In the message left she stated he cannot take oxy, she didn't specify kind  He is not sleeping and hopes this helps him  I did try to return call, got voicemail and left a message  Please advise        [facility]    Call back Ambrose Marie

## 2020-09-17 ENCOUNTER — APPOINTMENT (OUTPATIENT)
Dept: RADIOLOGY | Facility: HOSPITAL | Age: 65
DRG: 241 | End: 2020-09-17
Payer: COMMERCIAL

## 2020-09-17 ENCOUNTER — APPOINTMENT (EMERGENCY)
Dept: CT IMAGING | Facility: HOSPITAL | Age: 65
DRG: 241 | End: 2020-09-17
Payer: COMMERCIAL

## 2020-09-17 ENCOUNTER — ANESTHESIA EVENT (INPATIENT)
Dept: GASTROENTEROLOGY | Facility: HOSPITAL | Age: 65
DRG: 241 | End: 2020-09-17
Payer: COMMERCIAL

## 2020-09-17 PROBLEM — F17.200 NICOTINE DEPENDENCE: Status: ACTIVE | Noted: 2020-09-17

## 2020-09-17 PROBLEM — M54.9 ACUTE BACK PAIN: Status: ACTIVE | Noted: 2020-09-17

## 2020-09-17 PROBLEM — R10.13 EPIGASTRIC PAIN: Status: ACTIVE | Noted: 2020-09-17

## 2020-09-17 LAB
ATRIAL RATE: 74 BPM
P AXIS: 66 DEGREES
PR INTERVAL: 148 MS
QRS AXIS: 81 DEGREES
QRSD INTERVAL: 126 MS
QT INTERVAL: 404 MS
QTC INTERVAL: 448 MS
T WAVE AXIS: 59 DEGREES
TROPONIN I SERPL-MCNC: <0.02 NG/ML
VENTRICULAR RATE: 74 BPM

## 2020-09-17 PROCEDURE — C9113 INJ PANTOPRAZOLE SODIUM, VIA: HCPCS | Performed by: EMERGENCY MEDICINE

## 2020-09-17 PROCEDURE — 93010 ELECTROCARDIOGRAM REPORT: CPT | Performed by: INTERNAL MEDICINE

## 2020-09-17 PROCEDURE — 96374 THER/PROPH/DIAG INJ IV PUSH: CPT

## 2020-09-17 PROCEDURE — 93005 ELECTROCARDIOGRAM TRACING: CPT

## 2020-09-17 PROCEDURE — NC001 PR NO CHARGE: Performed by: PHYSICIAN ASSISTANT

## 2020-09-17 PROCEDURE — G1004 CDSM NDSC: HCPCS

## 2020-09-17 PROCEDURE — 96376 TX/PRO/DX INJ SAME DRUG ADON: CPT

## 2020-09-17 PROCEDURE — 72110 X-RAY EXAM L-2 SPINE 4/>VWS: CPT

## 2020-09-17 PROCEDURE — 72072 X-RAY EXAM THORAC SPINE 3VWS: CPT

## 2020-09-17 PROCEDURE — 74177 CT ABD & PELVIS W/CONTRAST: CPT

## 2020-09-17 PROCEDURE — 36415 COLL VENOUS BLD VENIPUNCTURE: CPT | Performed by: EMERGENCY MEDICINE

## 2020-09-17 PROCEDURE — 96375 TX/PRO/DX INJ NEW DRUG ADDON: CPT

## 2020-09-17 PROCEDURE — 99244 OFF/OP CNSLTJ NEW/EST MOD 40: CPT | Performed by: INTERNAL MEDICINE

## 2020-09-17 PROCEDURE — 84484 ASSAY OF TROPONIN QUANT: CPT | Performed by: EMERGENCY MEDICINE

## 2020-09-17 PROCEDURE — 99222 1ST HOSP IP/OBS MODERATE 55: CPT | Performed by: INTERNAL MEDICINE

## 2020-09-17 RX ORDER — MORPHINE SULFATE 4 MG/ML
4 INJECTION, SOLUTION INTRAMUSCULAR; INTRAVENOUS
Status: DISCONTINUED | OUTPATIENT
Start: 2020-09-17 | End: 2020-09-17

## 2020-09-17 RX ORDER — DIPHENHYDRAMINE HYDROCHLORIDE 50 MG/ML
25 INJECTION INTRAMUSCULAR; INTRAVENOUS ONCE
Status: COMPLETED | OUTPATIENT
Start: 2020-09-17 | End: 2020-09-17

## 2020-09-17 RX ORDER — FENTANYL CITRATE 50 UG/ML
50 INJECTION, SOLUTION INTRAMUSCULAR; INTRAVENOUS EVERY 2 HOUR PRN
Status: DISCONTINUED | OUTPATIENT
Start: 2020-09-17 | End: 2020-09-18

## 2020-09-17 RX ORDER — NICOTINE 21 MG/24HR
1 PATCH, TRANSDERMAL 24 HOURS TRANSDERMAL DAILY
Status: DISCONTINUED | OUTPATIENT
Start: 2020-09-17 | End: 2020-09-21 | Stop reason: HOSPADM

## 2020-09-17 RX ORDER — ONDANSETRON 2 MG/ML
4 INJECTION INTRAMUSCULAR; INTRAVENOUS ONCE
Status: COMPLETED | OUTPATIENT
Start: 2020-09-17 | End: 2020-09-17

## 2020-09-17 RX ORDER — FENTANYL CITRATE 50 UG/ML
50 INJECTION, SOLUTION INTRAMUSCULAR; INTRAVENOUS ONCE
Status: COMPLETED | OUTPATIENT
Start: 2020-09-17 | End: 2020-09-17

## 2020-09-17 RX ORDER — SUCRALFATE 1 G/1
1 TABLET ORAL
Status: DISCONTINUED | OUTPATIENT
Start: 2020-09-17 | End: 2020-09-18

## 2020-09-17 RX ORDER — PANTOPRAZOLE SODIUM 40 MG/1
40 INJECTION, POWDER, FOR SOLUTION INTRAVENOUS ONCE
Status: COMPLETED | OUTPATIENT
Start: 2020-09-17 | End: 2020-09-17

## 2020-09-17 RX ORDER — FINASTERIDE 5 MG/1
5 TABLET, FILM COATED ORAL DAILY
Status: DISCONTINUED | OUTPATIENT
Start: 2020-09-17 | End: 2020-09-21 | Stop reason: HOSPADM

## 2020-09-17 RX ORDER — METOCLOPRAMIDE HYDROCHLORIDE 5 MG/ML
10 INJECTION INTRAMUSCULAR; INTRAVENOUS ONCE
Status: COMPLETED | OUTPATIENT
Start: 2020-09-17 | End: 2020-09-17

## 2020-09-17 RX ORDER — ONDANSETRON 2 MG/ML
4 INJECTION INTRAMUSCULAR; INTRAVENOUS EVERY 6 HOURS PRN
Status: DISCONTINUED | OUTPATIENT
Start: 2020-09-17 | End: 2020-09-21 | Stop reason: HOSPADM

## 2020-09-17 RX ORDER — ZOLPIDEM TARTRATE 5 MG/1
5 TABLET ORAL ONCE
Status: COMPLETED | OUTPATIENT
Start: 2020-09-17 | End: 2020-09-17

## 2020-09-17 RX ORDER — FENTANYL CITRATE 50 UG/ML
25 INJECTION, SOLUTION INTRAMUSCULAR; INTRAVENOUS ONCE
Status: COMPLETED | OUTPATIENT
Start: 2020-09-17 | End: 2020-09-17

## 2020-09-17 RX ORDER — FENTANYL CITRATE 50 UG/ML
100 INJECTION, SOLUTION INTRAMUSCULAR; INTRAVENOUS ONCE
Status: COMPLETED | OUTPATIENT
Start: 2020-09-17 | End: 2020-09-17

## 2020-09-17 RX ORDER — SUCRALFATE ORAL 1 G/10ML
1000 SUSPENSION ORAL ONCE
Status: COMPLETED | OUTPATIENT
Start: 2020-09-17 | End: 2020-09-17

## 2020-09-17 RX ADMIN — FENTANYL CITRATE 50 MCG: 50 INJECTION INTRAMUSCULAR; INTRAVENOUS at 18:30

## 2020-09-17 RX ADMIN — ONDANSETRON 4 MG: 2 INJECTION INTRAMUSCULAR; INTRAVENOUS at 20:31

## 2020-09-17 RX ADMIN — IOHEXOL 100 ML: 350 INJECTION, SOLUTION INTRAVENOUS at 02:40

## 2020-09-17 RX ADMIN — ONDANSETRON 4 MG: 2 INJECTION INTRAMUSCULAR; INTRAVENOUS at 02:45

## 2020-09-17 RX ADMIN — MORPHINE SULFATE 2 MG: 2 INJECTION, SOLUTION INTRAMUSCULAR; INTRAVENOUS at 14:43

## 2020-09-17 RX ADMIN — PANTOPRAZOLE SODIUM 40 MG: 40 INJECTION, POWDER, FOR SOLUTION INTRAVENOUS at 02:45

## 2020-09-17 RX ADMIN — FENTANYL CITRATE 50 MCG: 50 INJECTION INTRAMUSCULAR; INTRAVENOUS at 01:57

## 2020-09-17 RX ADMIN — SUCRALFATE 1 G: 1 TABLET ORAL at 21:01

## 2020-09-17 RX ADMIN — FENTANYL CITRATE 50 MCG: 50 INJECTION INTRAMUSCULAR; INTRAVENOUS at 04:07

## 2020-09-17 RX ADMIN — SUCRALFATE 1 G: 1 TABLET ORAL at 16:18

## 2020-09-17 RX ADMIN — METOCLOPRAMIDE HYDROCHLORIDE 10 MG: 5 INJECTION INTRAMUSCULAR; INTRAVENOUS at 05:10

## 2020-09-17 RX ADMIN — DIPHENHYDRAMINE HYDROCHLORIDE 25 MG: 50 INJECTION, SOLUTION INTRAMUSCULAR; INTRAVENOUS at 05:10

## 2020-09-17 RX ADMIN — FENTANYL CITRATE 50 MCG: 50 INJECTION INTRAMUSCULAR; INTRAVENOUS at 16:19

## 2020-09-17 RX ADMIN — SUCRALFATE 1000 MG: 1 SUSPENSION ORAL at 04:54

## 2020-09-17 RX ADMIN — SODIUM CHLORIDE 8 MG/HR: 9 INJECTION, SOLUTION INTRAVENOUS at 04:55

## 2020-09-17 RX ADMIN — FENTANYL CITRATE 50 MCG: 50 INJECTION INTRAMUSCULAR; INTRAVENOUS at 22:30

## 2020-09-17 RX ADMIN — MORPHINE SULFATE 2 MG: 2 INJECTION, SOLUTION INTRAMUSCULAR; INTRAVENOUS at 08:40

## 2020-09-17 RX ADMIN — FENTANYL CITRATE 25 MCG: 50 INJECTION INTRAMUSCULAR; INTRAVENOUS at 00:50

## 2020-09-17 RX ADMIN — FENTANYL CITRATE 50 MCG: 50 INJECTION INTRAMUSCULAR; INTRAVENOUS at 03:22

## 2020-09-17 RX ADMIN — SODIUM CHLORIDE 8 MG/HR: 9 INJECTION, SOLUTION INTRAVENOUS at 13:24

## 2020-09-17 RX ADMIN — ZOLPIDEM TARTRATE 5 MG: 5 TABLET, COATED ORAL at 16:18

## 2020-09-17 RX ADMIN — SODIUM CHLORIDE 8 MG/HR: 9 INJECTION, SOLUTION INTRAVENOUS at 21:39

## 2020-09-17 RX ADMIN — FENTANYL CITRATE 100 MCG: 50 INJECTION INTRAMUSCULAR; INTRAVENOUS at 04:52

## 2020-09-17 RX ADMIN — SUCRALFATE 1 G: 1 TABLET ORAL at 11:42

## 2020-09-17 RX ADMIN — MORPHINE SULFATE 2 MG: 2 INJECTION, SOLUTION INTRAMUSCULAR; INTRAVENOUS at 11:42

## 2020-09-17 NOTE — H&P
H&P- Leonid Lucero 1955, 60 y o  male MRN: [id number]    Unit/Bed#:  Encounter: [id number]    Primary Care Provider: Monika Woods MD   Date and time admitted to hospital: 9/16/2020 10:08 PM        * Epigastric pain  Assessment & Plan  4year-old man presenting with epigastric pain x2 days  Reportedly has had worsening acute back pain and has been taking 3 tablets of 600 mg ibuprofen every 4 hours as well as cortical steroid course  CT of the abdomen pelvis on admission did not demonstrate any evidence of acute abnormality  Labs including CMP lipase unremarkable  Troponin so far negative  EKG nonischemic    Likely peptic ulcer disease/gastritis in the setting of excess NSAID use  Currently on Protonix drip will continue  GI recommendations greatly appreciated  Patient scheduled for EGD tomorrow  Serial abdominal exams  Monitor labs closely    Acute back pain  Assessment & Plan  Patient reports acute onset severe back pain over the last 2-3 weeks for which she has been taking excessive NSAIDs  Will get x-ray of thoracic and lumbar spine    Nicotine dependence  Assessment & Plan  Will give nicotine patch  Counseled on smoking cessation  BPH associated with nocturia  Assessment & Plan  Continue finasteride      VTE Prophylaxis: low risk  / sequential compression device   Code Status: level 3  POLST: POLST form is not discussed and not completed at this time  Discussion with family: patient daughter over the phone    Anticipated Length of Stay:  Patient will be admitted on an Observation basis with an anticipated length of stay of  Less than 2 midnights  Justification for Hospital Stay: epigasttirc pain and EGD    Total Time for Visit, including Counseling / Coordination of Care: 30 minutes  Greater than 50% of this total time spent on direct patient counseling and coordination of care  Chief Complaint:   Epigastric pain    History of Present Illness:    Leonid Lucero is a 60 y o  male with no significant past medical history other than nicotine dependence, BPH who presents with worsening epigastric pain x2 days  He reports over the past month he has had issues with his back pain for which he was taking 1800 mg ibuprofen every 4 hours as well as course of corticosteroids  Abdominal pain seems to worse with food  He mentions that he had initially pain in center of the chest which resolved currently eats more in upper abdomen  Reportedly has lost 8 lb in the last 1 month  Review of Systems:    Review of Systems   Constitutional: Positive for activity change, fatigue and unexpected weight change  Negative for chills and fever  Respiratory: Negative for cough and chest tightness  Cardiovascular: Negative for chest pain and leg swelling  Gastrointestinal: Positive for abdominal pain and nausea  Negative for blood in stool and constipation  Musculoskeletal: Positive for back pain and gait problem  Neurological: Negative for dizziness, weakness and numbness  All other systems reviewed and are negative  Past Medical and Surgical History:     Past Medical History:   Diagnosis Date    Anemia     Atrial fibrillation (HCC)     [facility]) [facility] [address] [facility]     Hernia cerebri (HCC)     Hyperlipidemia     Lung nodule     RESOLVED 26 JUN 2016    Renal calculi     Sexual dysfunction        Past Surgical History:   Procedure Laterality Date    FACIAL COSMETIC SURGERY      FACIAL COSMETIC SURGERY      KIDNEY SURGERY      ORBITAL FRACTURE SURGERY      CLOSED TREATMENT OF ORBITAL FRACTURE(NON-BLOWOUT)    TONSILLECTOMY      VASECTOMY         Meds/Allergies:    Prior to Admission medications    Medication Sig Start Date End Date Taking?  Authorizing Provider   aspirin (ECOTRIN LOW STRENGTH) 81 mg EC tablet Take 81 mg by mouth daily   Yes Historical Provider, MD   finasteride (PROSCAR) 5 mg tablet Take 1 tablet (5 mg total) by mouth daily 2/13/20  Yes Marizol Soto PA-C   cyclobenzaprine (FLEXERIL) 5 mg tablet Take 1 tablet (5 mg total) by mouth daily at bedtime 9/11/20 10/21/20  Saida Guerrero MD   methylPREDNISolone 4 MG tablet therapy pack Use as directed on package 9/11/20 9/17/20  Saida Guerrero MD     I have reviewed home medications with patient personally  Allergies: Allergies   Allergen Reactions    Other Anaphylaxis     Shellfish, shrimp    Dilaudid [Hydromorphone]     Oxycodone        Social History:     Marital Status: /Civil Union   Occupation:   Patient Pre-hospital Living Situation:   Patient Pre-hospital Level of Mobility:   Patient Pre-hospital Diet Restrictions:   Substance Use History:   Social History     Substance and Sexual Activity   Alcohol Use No     Social History     Tobacco Use   Smoking Status Current Every Day Smoker    Packs/day: 1 00   Smokeless Tobacco Never Used   Tobacco Comment    LAST ASSESSED 27 JAN 2016     Social History     Substance and Sexual Activity   Drug Use No       Family History:    Family History   Problem Relation Age of Onset    Stroke Mother     Diabetes Mother     Hypertension Mother     Skin cancer Mother     Heart disease Mother     Scoliosis Sister        Physical Exam:     Vitals:   Blood Pressure: 144/76 (09/17/20 1100)  Pulse: 68 (09/17/20 1100)  Temperature: 98 °F (36 7 °C)(Simultaneous filing  User may not have seen previous data ) (09/16/20 2212)  Temp Source: Oral(Simultaneous filing  User may not have seen previous data ) (09/16/20 2212)  Respirations: 16 (09/17/20 1100)  Height: 5' 9" (175 3 cm) (09/17/20 0411)  Weight - Scale: 64 kg (141 lb 1 5 oz) (09/17/20 0411)  SpO2: 99 % (09/17/20 1100)    Physical Exam  Vitals signs and nursing note reviewed  Constitutional:       Appearance: Normal appearance  HENT:      Head: Normocephalic and atraumatic  Neck:      Musculoskeletal: Normal range of motion     Cardiovascular:      Rate and Rhythm: Normal rate and regular rhythm  Pulmonary:      Effort: Pulmonary effort is normal       Breath sounds: Normal breath sounds  Abdominal:      General: Abdomen is flat  Palpations: Abdomen is soft  Musculoskeletal: Normal range of motion  Skin:     General: Skin is warm  Neurological:      General: No focal deficit present  Mental Status: He is alert and oriented to person, place, and time  Additional Data:     Lab Results: I have personally reviewed pertinent reports  Results from last 7 days   Lab Units 09/16/20  2317   WBC Thousand/uL 8 90   HEMOGLOBIN g/dL 14 5   HEMATOCRIT % 45 1   PLATELETS Thousands/uL 290   NEUTROS PCT % 57   LYMPHS PCT % 33   MONOS PCT % 8   EOS PCT % 1     Results from last 7 days   Lab Units 09/16/20  2317   SODIUM mmol/L 138   POTASSIUM mmol/L 4 2   CHLORIDE mmol/L 102   CO2 mmol/L 30   BUN mg/dL 17   CREATININE mg/dL 0 88   ANION GAP mmol/L 6   CALCIUM mg/dL 9 7   ALBUMIN g/dL 3 9   TOTAL BILIRUBIN mg/dL 0 40   ALK PHOS U/L 62   ALT U/L 39   AST U/L 18   GLUCOSE RANDOM mg/dL 103                       Imaging: I have personally reviewed pertinent reports  CT abdomen pelvis with contrast   Final Result by Caroline Matthews MD (09/17 0303)      No acute inflammatory process identified within the abdomen or pelvis  Workstation performed: [id number]         XR spine thoracic 3 vw    (Results Pending)   XR spine lumbar minimum 4 views non injury    (Results Pending)       EKG, Pathology, and Other Studies Reviewed on Admission:   · EKG:     Allscripts / Epic Records Reviewed: Yes     ** Please Note: This note has been constructed using a voice recognition system   **

## 2020-09-17 NOTE — ASSESSMENT & PLAN NOTE
4year-old man presenting with epigastric pain x2 days  Reportedly has had worsening acute back pain and has been taking 3 tablets of 600 mg ibuprofen every 4 hours as well as cortical steroid course  CT of the abdomen pelvis on admission did not demonstrate any evidence of acute abnormality  Labs including CMP lipase unremarkable  Troponin so far negative  EKG nonischemic    Likely peptic ulcer disease/gastritis in the setting of excess NSAID use  Currently on Protonix drip will continue  GI recommendations greatly appreciated    Patient scheduled for EGD tomorrow  Serial abdominal exams  Monitor labs closely

## 2020-09-17 NOTE — ED PROVIDER NOTES
History  Chief Complaint   Patient presents with    Chest Pain     Pt advised by PCP to be evaluated in the ER for chest and epigastric pain  Pt states the pain is worse when he lays down      60 y o  male presents with 2 days of epigastric abdominal pain without radiation  Patient describes severe stabbing pain that came on gradually, worsened and continues ni the ER  Patient states eating aggravates the pain and nothing alleviates it  Patient had been taking 600 milligrams of ibuprofen for chronic back pain up to 3 tablets at a time every 4-5 hours for about a week and a half until he saw his primary care doctor on 09/11 and was prescribed corticosteroids and told to stop taking the ibuprofen  Took ibuprofen 600mg x3 every 4-5 hours for a week and a half then corticosteroids    Patient denies vomiting  Patient denies diarrhea  Patient notes last bowel movement was this afternoon; a little constipation, no melena or hematochezia  Patient denies testicular pain or penile discharge  ROS: Patient associates chronic sciatic back pain and denies fever/chills, dyspnea, chest pain, diaphoresis, groin pain, dysuria, hematuria, melena, or back/neck pain  All other systems reviewed and negative  Patient denies contacts with similar symptoms  Patient denies any recent use of antibiotics, international travel, or trauma  Patient notes history of prior hernia repair  Patient notes history of an enlarged prostate  Focused Objective Exam:  Abdomen:  Inspection of an abdomen without previous abdominal surgical incisions noted without erythema, rashes or ecchymosis noted  No abdominal pulsations noted  Normal bowel sounds with no bruit auscultated  Soft abdomen  Palpitation noted tenderness in the epigastrum with no tenderness in the remainder; tenderness not over McBurney's point  No masses or pulsatile aorta noted on examination    No rebound or guarding noted on examination, non-peritoneal exam  Negative psoas, obturator, and Rovsing's signs  Negative Padgett's sign  Back:  No rash or signs of herpes zoster  Skin:  No ecchymosis of the umbilicus (negative Corona's sign) or flank (negative Grey Ray's sign)  Warm and dry  Medical Decision Making  Abdominal pain with a broad differential   Will establish IV access and make patient NPO considering possibility of surgical intervention required  Will initiate symptomatic management  As patient has history of risk factors for ACS, will obtain EKG and troponin to evaluate for potential referred pain  Differential includes significant likelihood of intra-abdominal pathology, including concerns for potential perforation  Most likely symptoms gastric in pathology considering recent uses of NSAIDs and corticosteroids  Patient denies any hematochezia or melanotic stools  Will obtain CBC to evaluate for anemia and leukocytosis  Will obtain CMP to evaluate electrolytes, renal, biliary, and hepatic function  Will obtain lipase to evaluate for potential pancreatitis  Based on patient's age, history, physical exam and presenting complaint, will obtain contrast enhanced CT imaging of patient's abdomen and pelvis to further evaluate for possible intraabdominal pathology  Reassessment:  Patient is laboratory and imaging evaluation remarkable for etiology of patient's symptoms  Patient given Carafate, pantoprazole, Mylanta, and lidocaine with minimal relief  Patient required numerous doses of IV narcotic pain medication in addition to the typical medications for gastritis or ulcer pathology  Patient's hemoglobin normal and renal function normal   Unclear regarding patient's severe abdominal pathology though gastric pathology would be most likely considering diagnostic evaluation was unremarkable    Discussed options with the patient after extensive discussion, considering patient required such significant amount of narcotic pain medication to improve symptoms, will place in observation for continued monitoring evaluation by Gastroenterology for continued management  History provided by:  Patient  Abdominal Pain   Pain location:  Epigastric  Pain quality: stabbing    Pain radiates to:  Does not radiate  Pain severity:  Severe  Onset quality:  Gradual  Timing:  Constant  Progression:  Worsening  Relieved by:  Nothing  Worsened by:  Eating  Associated symptoms: no chest pain, no chills, no constipation, no cough, no diarrhea, no dysuria, no fatigue, no fever, no hematemesis, no hematochezia, no hematuria, no melena, no nausea, no shortness of breath, no sore throat and no vomiting        Prior to Admission Medications   Prescriptions Last Dose Informant Patient Reported? Taking?   aspirin (ECOTRIN LOW STRENGTH) 81 mg EC tablet 9/16/2020 at Unknown time  Yes Yes   Sig: Take 81 mg by mouth daily   cyclobenzaprine (FLEXERIL) 5 mg tablet   No No   Sig: Take 1 tablet (5 mg total) by mouth daily at bedtime   finasteride (PROSCAR) 5 mg tablet 9/16/2020 at Unknown time  No Yes   Sig: Take 1 tablet (5 mg total) by mouth daily      Facility-Administered Medications: None       Past Medical History:   Diagnosis Date    Anemia     Atrial fibrillation (HCC)     COPD (chronic obstructive pulmonary disease) (HCC)     Hernia cerebri (HCC)     Hyperlipidemia     Lung nodule     RESOLVED 26 JUN 2016    Renal calculi     Sexual dysfunction        Past Surgical History:   Procedure Laterality Date    FACIAL COSMETIC SURGERY      FACIAL COSMETIC SURGERY      KIDNEY SURGERY      ORBITAL FRACTURE SURGERY      CLOSED TREATMENT OF ORBITAL FRACTURE(NON-BLOWOUT)    TONSILLECTOMY      VASECTOMY         Family History   Problem Relation Age of Onset    Stroke Mother     Diabetes Mother     Hypertension Mother     Skin cancer Mother     Heart disease Mother     Scoliosis Sister      I have reviewed and agree with the history as documented      E-Cigarette/Vaping E-Cigarette/Vaping Substances     Social History     Tobacco Use    Smoking status: Current Every Day Smoker     Packs/day: 1 00    Smokeless tobacco: Never Used    Tobacco comment: LAST ASSESSED 27 JAN 2016   Substance Use Topics    Alcohol use: No    Drug use: No       Review of Systems   Constitutional: Negative for chills, fatigue and fever  HENT: Negative for sore throat  Respiratory: Negative for cough and shortness of breath  Cardiovascular: Negative for chest pain  Gastrointestinal: Positive for abdominal pain  Negative for constipation, diarrhea, hematemesis, hematochezia, melena, nausea and vomiting  Genitourinary: Negative for dysuria and hematuria  All other systems reviewed and are negative  Physical Exam  Physical Exam  Vitals signs reviewed  HENT:      Head: Atraumatic  Eyes:      Pupils: Pupils are equal, round, and reactive to light  Neck:      Musculoskeletal: Neck supple  Cardiovascular:      Rate and Rhythm: Normal rate  Pulmonary:      Effort: Pulmonary effort is normal       Breath sounds: Normal breath sounds  Abdominal:      General: There is no distension  Palpations: Abdomen is soft  Tenderness: There is abdominal tenderness  There is no guarding or rebound  Musculoskeletal:         General: No deformity  Right lower leg: He exhibits no tenderness  No edema  Left lower leg: He exhibits no tenderness  No edema  Skin:     General: Skin is dry  Neurological:      General: No focal deficit present  Mental Status: He is alert     Psychiatric:         Mood and Affect: Mood normal          Vital Signs  ED Triage Vitals   Temperature Pulse Respirations Blood Pressure SpO2   09/16/20 2212 09/16/20 2212 09/16/20 2212 09/16/20 2212 09/16/20 2212   98 °F (36 7 °C) 67 20 133/77 98 %      Temp Source Heart Rate Source Patient Position - Orthostatic VS BP Location FiO2 (%)   09/16/20 2212 09/16/20 2212 09/17/20 0130 09/16/20 2212 -- Oral Monitor Lying Right arm       Pain Score       09/17/20 0050       Worst Possible Pain           Vitals:    09/17/20 0300 09/17/20 0330 09/17/20 0400 09/17/20 0500   BP: 144/83 136/81 136/83 120/71   Pulse: 83 72 76 65   Patient Position - Orthostatic VS: Lying Lying Lying Lying         Visual Acuity  Visual Acuity      Most Recent Value   L Pupil Size (mm)  4   R Pupil Size (mm)  4   L Pupil Shape  Round   R Pupil Shape  Round          ED Medications  Medications   pantoprazole (PROTONIX) 80 mg in sodium chloride 0 9 % 100 mL infusion (8 mg/hr Intravenous New Bag 9/17/20 0455)   aluminum-magnesium hydroxide-simethicone (MYLANTA) 200-200-20 mg/5 mL oral suspension 30 mL (30 mL Oral Given 9/16/20 2318)   Lidocaine Viscous HCl (XYLOCAINE) 2 % mucosal solution 15 mL (15 mL Swish & Swallow Given 9/16/20 2318)   sucralfate (CARAFATE) oral suspension (SUADRSHAN/PEDS) 1,000 mg (1,000 mg Oral Given 9/16/20 2357)   fentanyl citrate (PF) 100 MCG/2ML 25 mcg (25 mcg Intravenous Given 9/17/20 0050)   fentanyl citrate (PF) 100 MCG/2ML 50 mcg (50 mcg Intravenous Given 9/17/20 0157)   fentanyl citrate (PF) 100 MCG/2ML 50 mcg (50 mcg Intravenous Given 9/17/20 0322)   pantoprazole (PROTONIX) injection 40 mg (40 mg Intravenous Given 9/17/20 0245)   ondansetron (ZOFRAN) injection 4 mg (4 mg Intravenous Given 9/17/20 0245)   iohexol (OMNIPAQUE) 350 MG/ML injection (MULTI-DOSE) 100 mL (100 mL Intravenous Given 9/17/20 0240)   fentanyl citrate (PF) 100 MCG/2ML 50 mcg (50 mcg Intravenous Given 9/17/20 0407)   fentanyl citrate (PF) 100 MCG/2ML 100 mcg (100 mcg Intravenous Given 9/17/20 0452)   sucralfate (CARAFATE) oral suspension (SUDARSHAN/PEDS) 1,000 mg (1,000 mg Oral Given 9/17/20 0454)   metoclopramide (REGLAN) injection 10 mg (10 mg Intravenous Given 9/17/20 0510)   diphenhydrAMINE (BENADRYL) injection 25 mg (25 mg Intravenous Given 9/17/20 0510)       Diagnostic Studies  Results Reviewed     Procedure Component Value Units Date/Time Troponin I repeat in 3hrs [[id number]]  (Normal) Collected:  09/17/20 0202    Lab Status:  Final result Specimen:  Blood from Arm, Right Updated:  09/17/20 0230     Troponin I <0 02 ng/mL     Troponin I [[id number]]  (Normal) Collected:  09/16/20 2317    Lab Status:  Final result Specimen:  Blood from Arm, Right Updated:  09/16/20 2347     Troponin I <0 02 ng/mL     CMP [[id number]] Collected:  09/16/20 2317    Lab Status:  Final result Specimen:  Blood from Arm, Right Updated:  09/16/20 2345     Sodium 138 mmol/L      Potassium 4 2 mmol/L      Chloride 102 mmol/L      CO2 30 mmol/L      ANION GAP 6 mmol/L      BUN 17 mg/dL      Creatinine 0 88 mg/dL      Glucose 103 mg/dL      Calcium 9 7 mg/dL      AST 18 U/L      ALT 39 U/L      Alkaline Phosphatase 62 U/L      Total Protein 8 0 g/dL      Albumin 3 9 g/dL      Total Bilirubin 0 40 mg/dL      eGFR 91 ml/min/1 73sq m     Narrative:       [facility] guidelines for Chronic Kidney Disease (CKD):     Stage 1 with normal or high GFR (GFR > 90 mL/min/1 73 square meters)    Stage 2 Mild CKD (GFR = 60-89 mL/min/1 73 square meters)    Stage 3A Moderate CKD (GFR = 45-59 mL/min/1 73 square meters)    Stage 3B Moderate CKD (GFR = 30-44 mL/min/1 73 square meters)    Stage 4 Severe CKD (GFR = 15-29 mL/min/1 73 square meters)    Stage 5 End Stage CKD (GFR <15 mL/min/1 73 square meters)  Note: GFR calculation is accurate only with a steady state creatinine    Lipase [[id number]]  (Normal) Collected:  09/16/20 2317    Lab Status:  Final result Specimen:  Blood from Arm, Right Updated:  09/16/20 2345     Lipase 105 u/L     CBC and differential [[id number]] Collected:  09/16/20 2317    Lab Status:  Final result Specimen:  Blood from Arm, Right Updated:  09/16/20 2324     WBC 8 90 Thousand/uL      RBC 4 92 Million/uL      Hemoglobin 14 5 g/dL      Hematocrit 45 1 %      MCV 92 fL      MCH 29 5 pg      MCHC 32 2 g/dL      RDW 13 3 %      MPV 9 4 fL      Platelets 290 Thousands/uL      nRBC 0 /100 WBCs      Neutrophils Relative 57 %      Immat GRANS % 0 %      Lymphocytes Relative 33 %      Monocytes Relative 8 %      Eosinophils Relative 1 %      Basophils Relative 1 %      Neutrophils Absolute 5 15 Thousands/µL      Immature Grans Absolute 0 04 Thousand/uL      Lymphocytes Absolute 2 89 Thousands/µL      Monocytes Absolute 0 67 Thousand/µL      Eosinophils Absolute 0 06 Thousand/µL      Basophils Absolute 0 09 Thousands/µL                  CT abdomen pelvis with contrast   Final Result by Patty Reynoso MD (09/17 0303)      No acute inflammatory process identified within the abdomen or pelvis  Workstation performed: [id number]                    Procedures  Procedures         ED Course  ED Course as of Sep 17 0611   Thu Sep 17, 2020   0216 EKG demonstrated normal sinus rhythm with nonspecific intraventricular block  No acute ST segment changes  HEART Risk Score      Most Recent Value   Heart Score Risk Calculator   History  0 Filed at: 09/16/2020 2243   ECG  0 Filed at: 09/16/2020 2243   Age  1 Filed at: 09/16/2020 2243   Risk Factors  1 Filed at: 09/16/2020 2243   Troponin  0 Filed at: 09/16/2020 2243   HEART Score  2 Filed at: 09/16/2020 2243                      SBIRT 20yo+      Most Recent Value   SBIRT (23 yo +)   In order to provide better care to our patients, we are screening all of our patients for alcohol and drug use  Would it be okay to ask you these screening questions? Yes Filed at: 09/17/2020 0003   Initial Alcohol Screen: US AUDIT-C    1  How often do you have a drink containing alcohol?  0 Filed at: 09/17/2020 0003   2  How many drinks containing alcohol do you have on a typical day you are drinking? 0 Filed at: 09/17/2020 0003   3a  Male UNDER 65: How often do you have five or more drinks on one occasion? 0 Filed at: 09/17/2020 0003   3b  FEMALE Any Age, or MALE 65+:  How often do you have 4 or more drinks on one occassion?  0 Filed at: 09/17/2020 0003   Audit-C Score  0 Filed at: 09/17/2020 0003   NATALIIA: How many times in the past year have you    Used an illegal drug or used a prescription medication for non-medical reasons? Never Filed at: 09/17/2020 0003                  MDM    Disposition  Final diagnoses:   Epigastric pain     Time reflects when diagnosis was documented in both MDM as applicable and the Disposition within this note     Time User Action Codes Description Comment    9/17/2020  4:48 AM Cindy Workman Add [R10 13] Epigastric pain       ED Disposition     ED Disposition Condition Date/Time Comment    Admit Stable Thu Sep 17, 2020  4:48 AM Case was discussed with RUPESH and the patient's admission status was agreed to be Admission Status: observation status to the service of Dr Balwinder King    None         Patient's Medications   Discharge Prescriptions    No medications on file     No discharge procedures on file      PDMP Review     None          ED Provider  Electronically Signed by           Saumya Parkinson MD  09/17/20 9622

## 2020-09-17 NOTE — CONSULTS
Consultation - [facility]  Valentina Perla 60 y o  male MRN: [id number]  Unit/Bed#: ED 07 Encounter: [id number]      Consults     Reason for Consult / Principal Problem: Epigastric Pain    HPI: Valentina Perla is a 60y o  year old male who presents with epigastric pain x2 days  Patient reports that over the past month he has been struggling with back pain and for a week and a half was taking very high doses of Ibuprofen  He reports he was taking 3 tablets of 600 mg Ibuprofen every 4 hours  He also was recently put on a Corticosteroid course for his back pain  He states that the abdominal pain does seem to be worsened by food but he is able to tolerate liquids  He states he was initially having radiation of the pain to the chest but this has subsided  He denies any nausea or vomiting  He denies any heartburn  He denies dysphagia  He denies melena or rectal bleeding  He denies SOB  He reports he did have an EGD in the past about 20 years ago and states that it was normal at that time  Upon admission a CT scan of the abdomen and pelvis was obtained which showed no evidence of an acute intra-abdominal abnormality  CBC, CMP, and lipase levels were unremarkable  He also had negative troponin levels x2  REVIEW OF SYSTEMS:     CONSTITUTIONAL: Denies any fever, chills, or rigors  No recent weight loss  HEENT: No earache or tinnitus  Denies hearing loss or visual disturbances  CARDIOVASCULAR: +Chest pain  RESPIRATORY: Denies any cough, hemoptysis, shortness of breath or dyspnea on exertion  GASTROINTESTINAL: As noted in the History of Present Illness  GENITOURINARY: No problems with urination  Denies any hematuria or dysuria  NEUROLOGIC: No dizziness or vertigo, denies headaches  MUSCULOSKELETAL: +Back pain  SKIN: Denies skin rashes or itching  ENDOCRINE: Denies excessive thirst  Denies intolerance to heat or cold  PSYCHOSOCIAL: Denies depression or anxiety   Denies any recent memory loss  Historical Information   Past Medical History:   Diagnosis Date    Anemia     Atrial fibrillation ([facility] [address] )     COPD [facility]chronic obstructive pulmonary disease) ([facility] [address] [facility]     Hernia cerebri (HCC)     Hyperlipidemia     Lung nodule     RESOLVED 26 JUN 2016    Renal calculi     Sexual dysfunction      Past Surgical History:   Procedure Laterality Date    FACIAL COSMETIC SURGERY      FACIAL COSMETIC SURGERY      KIDNEY SURGERY      ORBITAL FRACTURE SURGERY      CLOSED TREATMENT OF ORBITAL FRACTURE(NON-BLOWOUT)    TONSILLECTOMY      VASECTOMY       Social History   Social History     Substance and Sexual Activity   Alcohol Use No     Social History     Substance and Sexual Activity   Drug Use No     Social History     Tobacco Use   Smoking Status Current Every Day Smoker    Packs/day: 1 00   Smokeless Tobacco Never Used   Tobacco Comment    LAST ASSESSED 32 JAN 2016     Family History   Problem Relation Age of Onset    Stroke Mother     Diabetes Mother     Hypertension Mother     Skin cancer Mother     Heart disease Mother     Scoliosis Sister        Meds/Allergies     (Not in a hospital admission)    Current Facility-Administered Medications   Medication Dose Route Frequency    morphine injection 2 mg  2 mg Intravenous Q4H PRN    pantoprazole (PROTONIX) 80 mg in sodium chloride 0 9 % 100 mL infusion  8 mg/hr Intravenous Continuous    sucralfate (CARAFATE) tablet 1 g  1 g Oral 4x Daily (AC & HS)       Allergies   Allergen Reactions    Other Anaphylaxis     Shellfish, shrimp    Dilaudid [Hydromorphone]     Oxycodone        Objective   Blood pressure 125/75, pulse 61, temperature 98 °F (36 7 °C), temperature source Oral, resp  rate 16, height 5' 9" (1 753 m), weight 64 kg (141 lb 1 5 oz), SpO2 95 %    No intake or output data in the 24 hours ending 09/17/20 1103      PHYSICAL EXAM:     General Appearance:   Alert, cooperative, no distress, appears stated age    HEENT:   Normocephalic, atraumatic, anicteric      Neck:  Supple, symmetrical, trachea midline, no adenopathy;    thyroid: no enlargement/tenderness/nodules; no carotid  bruit or JVD    Lungs:   Clear to auscultation bilaterally; no rales, rhonchi or wheezing; respirations unlabored    Heart[de-identified]   S1 and S2 normal; regular rate and rhythm; no murmur, rub, or gallop     Abdomen:   Soft, mild epigastric TTP, non-distended; normal bowel sounds; no masses, no organomegaly    Genitalia:   Deferred    Rectal:   Deferred    Extremities:  No cyanosis, clubbing or edema    Pulses:  2+ and symmetric all extremities    Skin:  Skin color, texture, turgor normal, no rashes or lesions    Lymph nodes:  No palpable cervical, axillary or inguinal lymphadenopathy        Lab Results:   Admission on 09/16/2020   Component Date Value    WBC 09/16/2020 8 90     RBC 09/16/2020 4 92     Hemoglobin 09/16/2020 14 5     Hematocrit 09/16/2020 45 1     MCV 09/16/2020 92     MCH 09/16/2020 29 5     MCHC 09/16/2020 32 2     RDW 09/16/2020 13 3     MPV 09/16/2020 9 4     Platelets 57/59/1719 290     nRBC 09/16/2020 0     Neutrophils Relative 09/16/2020 57     Immat GRANS % 09/16/2020 0     Lymphocytes Relative 09/16/2020 33     Monocytes Relative 09/16/2020 8     Eosinophils Relative 09/16/2020 1     Basophils Relative 09/16/2020 1     Neutrophils Absolute 09/16/2020 5 15     Immature Grans Absolute 09/16/2020 0 04     Lymphocytes Absolute 09/16/2020 2 89     Monocytes Absolute 09/16/2020 0 67     Eosinophils Absolute 09/16/2020 0 06     Basophils Absolute 09/16/2020 0 09     Sodium 09/16/2020 138     Potassium 09/16/2020 4 2     Chloride 09/16/2020 102     CO2 09/16/2020 30     ANION GAP 09/16/2020 6     BUN 09/16/2020 17     Creatinine 09/16/2020 0 88     Glucose 09/16/2020 103     Calcium 09/16/2020 9 7     AST 09/16/2020 18     ALT 09/16/2020 39     Alkaline Phosphatase 09/16/2020 62     Total Protein 09/16/2020 8 0     Albumin 09/16/2020 3 9     Total Bilirubin 09/16/2020 0 40     eGFR 09/16/2020 91     Lipase 09/16/2020 105     Troponin I 09/16/2020 <0 02     Troponin I 09/17/2020 <0 02        Imaging Studies: I have personally reviewed pertinent imaging studies  CT A/P: No acute intraabdominal process  ASSESSMENT & PLAN:    Epigastric Pain x 2 days    - Patient presents with epigastric pain x 2 days in the setting of recent high dose NSAID use and steroids (he was taking 1800mg of Ibuprofen every 4 hours for a week and a half)  - CT A/P showed no acute intraabdominal process  CBC/CMP/Lipase and Trop x 2 normal   - Continue Protonix gtt and Carafate QID   - Supportive care, serial abdominal exams, etc   - Will plan for EGD to investigate for PUD, erosive gastritis, etc   NPO after midnight for procedure tomorrow  The patient will be seen and evaluated by Dr Juanita Mcdonough PA-C  9/17/2020,11:03 AM

## 2020-09-17 NOTE — ED NOTES
Pt resting comfortably   Wife does not want pt to be woken up to be switched to a hospital bed at this time     Vick Bradford RN  09/17/20 8852

## 2020-09-17 NOTE — CASE MANAGEMENT
LOS 12 HOURS  RISK OF UNPLANNED READMISSION SCORE N/A  30 DAY READMISSION: NO  BUNDLE: NO    CM s/w patient's wife, Carrie Jama, over the phone  Per Carrie Jama, patient lives at home with wife and is IPTA w/ all ADLs  Carrie Jama stated "he's completely self-sufficient  He is a  and does everything  He's just been having some issues with this stomach pain "    No hx VNA, STR, or MH identified  Per Carrie Jama, patient smokes approx 1 PPD, no ETOH use disclosed  PCP: Johanna Golden    [facility], no barriers identified to obtaining Rx from that location  CM reviewed discharge planning process including the following: identifying help at home, patient preference for discharge planning needs, pharmacy preference, and availability of treatment team to discuss questions or concerns patient and/or family may have regarding understanding medications and recognizing signs and symptoms once discharged  CM also encouraged patient to follow up with all recommended appointments after discharge  Patient advised of importance for patient and family to participate in managing patients medical well being  CM name and role reviewed  Discharge Checklist reviewed and CM will continue to monitor for progress toward discharge goals in nursing and provider rounds  CM discussed patient's insurance coverage  Carrie Jama stating insurance coverage ended a couple of year ago and that patient is in the process of applying for PA MA  CM called patient's old insurance card to confirm insurance coverage  ([employer] per media tab)  Per Nancy, patient lost eligibility in 2018  Patient drives self to medical appointments and for daily living as needed  Patient's wife will provide transportation home when cleared for discharge  CM department to follow patient through discharge

## 2020-09-17 NOTE — ASSESSMENT & PLAN NOTE
Patient reports acute onset severe back pain over the last 2-3 weeks for which she has been taking excessive NSAIDs  Will get x-ray of thoracic and lumbar spine

## 2020-09-17 NOTE — ED NOTES
1  CC Abdominal pain     2  Is this admission due to an injury? No     3  Orientation status A&Oxs 4     4  Abnormal labs/abnormal focused assessment/abnormal vitals    5  Medications/ drips Protonix gtt 8mg/hr     6  Last time narcotics were given/ pain medication 2mg IVP morphine @ 8:40, PRN Q4hr     7  IV lines/drains/ etc  20RAC     8  Isolation status None     9  Skin WDL     10  Ambulation status Ambulatory/self     11   ED phone # [phone]     Lina Finch RN  09/17/20 1007

## 2020-09-17 NOTE — UTILIZATION REVIEW
Initial Clinical Review    Admission: Date/Time/Statement:   Admission Orders (From admission, onward)     Ordered        09/17/20 0449  Place in Observation  Once                   Orders Placed This Encounter   Procedures    Place in Observation     Standing Status:   Standing     Number of Occurrences:   1     Order Specific Question:   Admitting Physician     Answer:   Mili Arredondo [[id number]]     Order Specific Question:   Level of Care     Answer:   Med Surg [[id number]]     ED Arrival Information     Expected Arrival Acuity Means of Arrival Escorted By Service Admission Type    - 9/16/2020 22:06 Urgent Walk-In Spouse Hospitalist Urgent    Arrival Complaint    Chest Pain;SOB        Chief Complaint   Patient presents with    Chest Pain     Pt advised by PCP to be evaluated in the ER for chest and epigastric pain  Pt states the pain is worse when he lays down      Assessment/Plan: 59 yo male to ED from home w/ worsening epigastric pain x2 days   Having issues w/ back for the past month an taking 1800 mg ibuprofen q4hr as well as steroids  abd pain worse w/ food  Lost 8 lbs in the last month   Admitted OBS status for epigastric pain likely peptic ulcer /gastritis , cont protonix gtt , GI following , EGD scheduled for tomorrow, serial abd exams and monitor labs   9/17 GI consult   Epigastric pain x2 days   Recent high use NSAID use and steroids  Taking 1800mg ibuprofen q4hr for a week and a half  Plan to cont protonix gtt and carafate QID , supportive care and serial abd exams  Plan for EGD to investigate for PUD , erosive gastrotis   NPO after for EGD on 9/18    ED Triage Vitals   Temperature Pulse Respirations Blood Pressure SpO2   09/16/20 2212 09/16/20 2212 09/16/20 2212 09/16/20 2212 09/16/20 2212   98 °F (36 7 °C) 67 20 133/77 98 %      Temp Source Heart Rate Source Patient Position - Orthostatic VS BP Location FiO2 (%)   09/16/20 2212 09/16/20 2212 09/17/20 0130 09/16/20 2212 --   Oral Monitor Lying Right arm Pain Score       09/17/20 0050       Worst Possible Pain          Wt Readings from Last 1 Encounters:   09/17/20 64 kg (141 lb 1 5 oz)     Additional Vital Signs:   09/17/20 0800      73   22   144/80   107   98 %   None (Room air)   Lying    09/17/20 0700      66   18   110/56   78   96 %   None (Room air)   Lying    09/17/20 0630      78   17   141/60   87   97 %   None (Room air)   Lying    09/17/20 0615      61   15   136/77   101   99 %   None (Room air)   Lying    09/17/20 0600      61   16   120/73   91   98 %   None (Room air)   Lying    09/17/20 0500      65   14   120/71   90   94 %   None (Room air)   Lying    09/17/20 0400      76   18   136/83   103   96 %   None (Room air)   Lying    09/17/20 0330      72   15   136/81   104   95 %   None (Room air)   Lying    09/17/20 0300      83   22   144/83   108   99 %   None (Room air)   Lying    09/17/20 0230      72   18   136/79   102   96 %   None (Room air)   Lying    09/17/20 0200      76   20   131/78   99   99 %   None (Room air)   Lying    09/17/20 0130      76   20   141/84   108   97 %   None (Room air)   Lying    09/17/20 0001                     None (Room air         Pertinent Labs/Diagnostic Test Results:   9/17 CT abd No acute inflammatory process identified within the abdomen or pelvis    Results from last 7 days   Lab Units 09/16/20  2317   WBC Thousand/uL 8 90   HEMOGLOBIN g/dL 14 5   HEMATOCRIT % 45 1   PLATELETS Thousands/uL 290   NEUTROS ABS Thousands/µL 5 15     Results from last 7 days   Lab Units 09/16/20  2317   SODIUM mmol/L 138   POTASSIUM mmol/L 4 2   CHLORIDE mmol/L 102   CO2 mmol/L 30   ANION GAP mmol/L 6   BUN mg/dL 17   CREATININE mg/dL 0 88   EGFR ml/min/1 73sq m 91   CALCIUM mg/dL 9 7     Results from last 7 days   Lab Units 09/16/20  2317   AST U/L 18   ALT U/L 39   ALK PHOS U/L 62   TOTAL PROTEIN g/dL 8 0   ALBUMIN g/dL 3 9   TOTAL BILIRUBIN mg/dL 0 40     Results from last 7 days   Lab Units 09/16/20  2317   GLUCOSE RANDOM mg/dL 103     Results from last 7 days   Lab Units 09/17/20  0202 09/16/20  2317   TROPONIN I ng/mL <0 02 <0 02       Results from last 7 days   Lab Units 09/16/20  2317   LIPASE u/L 105     ED Treatment:   Medication Administration from 09/16/2020 2206 to 09/17/2020 0850       Date/Time Order Dose Route Action     09/16/2020 2318 aluminum-magnesium hydroxide-simethicone (MYLANTA) 200-200-20 mg/5 mL oral suspension 30 mL 30 mL Oral Given     09/16/2020 2318 Lidocaine Viscous HCl (XYLOCAINE) 2 % mucosal solution 15 mL 15 mL Swish & Swallow Given     09/16/2020 2357 sucralfate (CARAFATE) oral suspension (SUDARSHAN/PEDS) 1,000 mg 1,000 mg Oral Given     09/17/2020 0050 fentanyl citrate (PF) 100 MCG/2ML 25 mcg 25 mcg Intravenous Given     09/17/2020 0157 fentanyl citrate (PF) 100 MCG/2ML 50 mcg 50 mcg Intravenous Given     09/17/2020 0322 fentanyl citrate (PF) 100 MCG/2ML 50 mcg 50 mcg Intravenous Given     09/17/2020 0245 pantoprazole (PROTONIX) injection 40 mg 40 mg Intravenous Given     09/17/2020 0245 ondansetron (ZOFRAN) injection 4 mg 4 mg Intravenous Given     09/17/2020 0407 fentanyl citrate (PF) 100 MCG/2ML 50 mcg 50 mcg Intravenous Given     09/17/2020 0452 fentanyl citrate (PF) 100 MCG/2ML 100 mcg 100 mcg Intravenous Given     09/17/2020 0454 sucralfate (CARAFATE) oral suspension (SUDARSHAN/PEDS) 1,000 mg 1,000 mg Oral Given     09/17/2020 0455 pantoprazole (PROTONIX) 80 mg in sodium chloride 0 9 % 100 mL infusion 8 mg/hr Intravenous New Bag     09/17/2020 0510 metoclopramide (REGLAN) injection 10 mg 10 mg Intravenous Given     09/17/2020 0510 diphenhydrAMINE (BENADRYL) injection 25 mg 25 mg Intravenous Given     09/17/2020 0840 morphine injection 2 mg 2 mg Intravenous Given        Past Medical History:   Diagnosis Date    Anemia     Atrial fibrillation (HCC)     COPD [facility]chronic obstructive pulmonary disease) ([facility] [address] )     Hernia cerebri (HCC)     Hyperlipidemia     Lung nodule RESOLVED 26 JUN 2016    Renal calculi     Sexual dysfunction      Present on Admission:  **None**      Admitting Diagnosis: Chest pain [R07 9]  Age/Sex: 60 y o  male  Admission Orders:  Scheduled Medications:  sucralfate, 1 g, Oral, 4x Daily (AC & HS)      Continuous IV Infusions:  pantoprozole (PROTONIX) infusion (Continuous), 8 mg/hr, Intravenous, Continuous      PRN Meds:  morphine injection, 2 mg, Intravenous, Q4H PRN   9/17  x3    NPO after MN 9/17  Full liq diet   amb pt       IP CONSULT TO GASTROENTEROLOGY  IP CONSULT TO CASE MANAGEMENT    Network Utilization Review Department  [email or web address]  ATTENTION: Please call with any questions or concerns to [phone] and carefully listen to the prompts so that you are directed to the right person  All voicemails are confidential   Steffi Bhatt all requests for admission clinical reviews, approved or denied determinations and any other requests to dedicated fax number below belonging to the campus where the patient is receiving treatment   List of dedicated fax numbers for the Facilities:  [address] DENIALS (Administrative/Medical Necessity) [phone]   [address] (Maternity/NICU/Pediatrics) [phone]   Vikas Montalvo [phone]     [address]  [phone]   Harrison Escobedo [phone]   145 [facility]  [phone]   [address] [phone]   [facility]  [phone]   2205 [facility]  2401 [facility] [address] [facility] [phone]

## 2020-09-18 ENCOUNTER — HOSPITAL ENCOUNTER (INPATIENT)
Dept: GASTROENTEROLOGY | Facility: HOSPITAL | Age: 65
Discharge: HOME | DRG: 241 | End: 2020-09-18
Payer: COMMERCIAL

## 2020-09-18 ENCOUNTER — ANESTHESIA (INPATIENT)
Dept: GASTROENTEROLOGY | Facility: HOSPITAL | Age: 65
DRG: 241 | End: 2020-09-18
Payer: COMMERCIAL

## 2020-09-18 VITALS
HEART RATE: 73 BPM | OXYGEN SATURATION: 98 % | DIASTOLIC BLOOD PRESSURE: 82 MMHG | SYSTOLIC BLOOD PRESSURE: 143 MMHG | RESPIRATION RATE: 18 BRPM | TEMPERATURE: 99 F

## 2020-09-18 VITALS — HEART RATE: 83 BPM

## 2020-09-18 PROBLEM — E43 SEVERE PROTEIN-CALORIE MALNUTRITION (HCC): Status: ACTIVE | Noted: 2020-09-18

## 2020-09-18 LAB
ERYTHROCYTE [SEDIMENTATION RATE] IN BLOOD: 19 MM/HOUR (ref 0–19)
GLUCOSE SERPL-MCNC: 112 MG/DL (ref 65–140)

## 2020-09-18 PROCEDURE — 0DB68ZX EXCISION OF STOMACH, VIA NATURAL OR ARTIFICIAL OPENING ENDOSCOPIC, DIAGNOSTIC: ICD-10-PCS | Performed by: INTERNAL MEDICINE

## 2020-09-18 PROCEDURE — 99232 SBSQ HOSP IP/OBS MODERATE 35: CPT | Performed by: FAMILY MEDICINE

## 2020-09-18 PROCEDURE — 88305 TISSUE EXAM BY PATHOLOGIST: CPT | Performed by: PATHOLOGY

## 2020-09-18 PROCEDURE — 86430 RHEUMATOID FACTOR TEST QUAL: CPT | Performed by: FAMILY MEDICINE

## 2020-09-18 PROCEDURE — 82948 REAGENT STRIP/BLOOD GLUCOSE: CPT

## 2020-09-18 PROCEDURE — 84154 ASSAY OF PSA FREE: CPT | Performed by: FAMILY MEDICINE

## 2020-09-18 PROCEDURE — 84153 ASSAY OF PSA TOTAL: CPT | Performed by: FAMILY MEDICINE

## 2020-09-18 PROCEDURE — C9113 INJ PANTOPRAZOLE SODIUM, VIA: HCPCS | Performed by: EMERGENCY MEDICINE

## 2020-09-18 PROCEDURE — 43239 EGD BIOPSY SINGLE/MULTIPLE: CPT | Performed by: INTERNAL MEDICINE

## 2020-09-18 PROCEDURE — 85652 RBC SED RATE AUTOMATED: CPT | Performed by: FAMILY MEDICINE

## 2020-09-18 RX ORDER — SODIUM CHLORIDE, SODIUM LACTATE, POTASSIUM CHLORIDE, CALCIUM CHLORIDE 600; 310; 30; 20 MG/100ML; MG/100ML; MG/100ML; MG/100ML
INJECTION, SOLUTION INTRAVENOUS CONTINUOUS PRN
Status: DISCONTINUED | OUTPATIENT
Start: 2020-09-18 | End: 2020-09-18

## 2020-09-18 RX ORDER — DEXTROSE AND SODIUM CHLORIDE 5; .9 G/100ML; G/100ML
75 INJECTION, SOLUTION INTRAVENOUS CONTINUOUS
Status: DISCONTINUED | OUTPATIENT
Start: 2020-09-18 | End: 2020-09-18

## 2020-09-18 RX ORDER — ACETAMINOPHEN 160 MG/5ML
650 SUSPENSION, ORAL (FINAL DOSE FORM) ORAL EVERY 6 HOURS
Status: DISCONTINUED | OUTPATIENT
Start: 2020-09-18 | End: 2020-09-21 | Stop reason: HOSPADM

## 2020-09-18 RX ORDER — OXYCODONE HYDROCHLORIDE 5 MG/1
5 TABLET ORAL EVERY 4 HOURS PRN
Status: DISCONTINUED | OUTPATIENT
Start: 2020-09-18 | End: 2020-09-19

## 2020-09-18 RX ORDER — PROPOFOL 10 MG/ML
INJECTION, EMULSION INTRAVENOUS AS NEEDED
Status: DISCONTINUED | OUTPATIENT
Start: 2020-09-18 | End: 2020-09-18

## 2020-09-18 RX ORDER — LIDOCAINE HYDROCHLORIDE 20 MG/ML
15 SOLUTION OROPHARYNGEAL ONCE
Status: COMPLETED | OUTPATIENT
Start: 2020-09-18 | End: 2020-09-18

## 2020-09-18 RX ORDER — LIDOCAINE HYDROCHLORIDE 10 MG/ML
INJECTION, SOLUTION EPIDURAL; INFILTRATION; INTRACAUDAL; PERINEURAL AS NEEDED
Status: DISCONTINUED | OUTPATIENT
Start: 2020-09-18 | End: 2020-09-18

## 2020-09-18 RX ORDER — LIDOCAINE 50 MG/G
1 PATCH TOPICAL DAILY
Status: DISCONTINUED | OUTPATIENT
Start: 2020-09-18 | End: 2020-09-21 | Stop reason: HOSPADM

## 2020-09-18 RX ORDER — ACETAMINOPHEN 325 MG/1
650 TABLET ORAL EVERY 6 HOURS SCHEDULED
Status: DISCONTINUED | OUTPATIENT
Start: 2020-09-18 | End: 2020-09-18

## 2020-09-18 RX ORDER — OXYCODONE HYDROCHLORIDE 5 MG/1
5 TABLET ORAL EVERY 4 HOURS PRN
Status: DISCONTINUED | OUTPATIENT
Start: 2020-09-18 | End: 2020-09-18

## 2020-09-18 RX ORDER — MAGNESIUM HYDROXIDE/ALUMINUM HYDROXICE/SIMETHICONE 120; 1200; 1200 MG/30ML; MG/30ML; MG/30ML
15 SUSPENSION ORAL ONCE
Status: COMPLETED | OUTPATIENT
Start: 2020-09-18 | End: 2020-09-18

## 2020-09-18 RX ADMIN — SODIUM CHLORIDE 8 MG/HR: 9 INJECTION, SOLUTION INTRAVENOUS at 08:06

## 2020-09-18 RX ADMIN — ONDANSETRON 4 MG: 2 INJECTION INTRAMUSCULAR; INTRAVENOUS at 16:14

## 2020-09-18 RX ADMIN — ONDANSETRON 4 MG: 2 INJECTION INTRAMUSCULAR; INTRAVENOUS at 22:58

## 2020-09-18 RX ADMIN — FENTANYL CITRATE 50 MCG: 50 INJECTION INTRAMUSCULAR; INTRAVENOUS at 07:53

## 2020-09-18 RX ADMIN — SUCRALFATE 1 G: 1 TABLET ORAL at 08:00

## 2020-09-18 RX ADMIN — ONDANSETRON 4 MG: 2 INJECTION INTRAMUSCULAR; INTRAVENOUS at 06:14

## 2020-09-18 RX ADMIN — SUCRALFATE 1 G: 1 TABLET ORAL at 22:19

## 2020-09-18 RX ADMIN — SUCRALFATE 1 G: 1 TABLET ORAL at 16:25

## 2020-09-18 RX ADMIN — MORPHINE SULFATE 2 MG: 2 INJECTION, SOLUTION INTRAMUSCULAR; INTRAVENOUS at 22:17

## 2020-09-18 RX ADMIN — PROPOFOL 100 MG: 10 INJECTION, EMULSION INTRAVENOUS at 15:17

## 2020-09-18 RX ADMIN — SODIUM CHLORIDE 8 MG/HR: 9 INJECTION, SOLUTION INTRAVENOUS at 08:07

## 2020-09-18 RX ADMIN — FENTANYL CITRATE 50 MCG: 50 INJECTION INTRAMUSCULAR; INTRAVENOUS at 10:38

## 2020-09-18 RX ADMIN — MORPHINE SULFATE 2 MG: 2 INJECTION, SOLUTION INTRAMUSCULAR; INTRAVENOUS at 18:20

## 2020-09-18 RX ADMIN — LIDOCAINE HYDROCHLORIDE 50 MG: 10 INJECTION, SOLUTION EPIDURAL; INFILTRATION; INTRACAUDAL; PERINEURAL at 15:17

## 2020-09-18 RX ADMIN — LIDOCAINE HYDROCHLORIDE 15 ML: 20 SOLUTION ORAL; TOPICAL at 04:16

## 2020-09-18 RX ADMIN — LIDOCAINE 5% 1 PATCH: 700 PATCH TOPICAL at 12:56

## 2020-09-18 RX ADMIN — MORPHINE SULFATE 2 MG: 2 INJECTION, SOLUTION INTRAMUSCULAR; INTRAVENOUS at 12:40

## 2020-09-18 RX ADMIN — FENTANYL CITRATE 50 MCG: 50 INJECTION INTRAMUSCULAR; INTRAVENOUS at 05:36

## 2020-09-18 RX ADMIN — SODIUM CHLORIDE, SODIUM LACTATE, POTASSIUM CHLORIDE, AND CALCIUM CHLORIDE: .6; .31; .03; .02 INJECTION, SOLUTION INTRAVENOUS at 15:09

## 2020-09-18 RX ADMIN — ALUMINUM HYDROXIDE, MAGNESIUM HYDROXIDE, AND SIMETHICONE 15 ML: 200; 200; 20 SUSPENSION ORAL at 04:15

## 2020-09-18 RX ADMIN — FENTANYL CITRATE 50 MCG: 50 INJECTION INTRAMUSCULAR; INTRAVENOUS at 03:31

## 2020-09-18 RX ADMIN — PROPOFOL 50 MG: 10 INJECTION, EMULSION INTRAVENOUS at 15:18

## 2020-09-18 RX ADMIN — OXYCODONE HYDROCHLORIDE 5 MG: 5 TABLET ORAL at 20:37

## 2020-09-18 RX ADMIN — FENTANYL CITRATE 50 MCG: 50 INJECTION INTRAMUSCULAR; INTRAVENOUS at 01:26

## 2020-09-18 RX ADMIN — DEXTROSE AND SODIUM CHLORIDE 75 ML/HR: 5; .9 INJECTION, SOLUTION INTRAVENOUS at 12:56

## 2020-09-18 RX ADMIN — ACETAMINOPHEN 650 MG: 650 SUSPENSION ORAL at 22:57

## 2020-09-18 RX ADMIN — SODIUM CHLORIDE 8 MG/HR: 9 INJECTION, SOLUTION INTRAVENOUS at 20:39

## 2020-09-18 RX ADMIN — OXYCODONE HYDROCHLORIDE 5 MG: 5 TABLET ORAL at 16:21

## 2020-09-18 NOTE — NURSING NOTE
Alert, Ox4, non-labored breathing  C/o severe nausea, dry heaving, went into brief SVT then back to SR/ST  Zofran given  SLIM aware  PRN fentanyl for abdominal pain with minimal relief  Pain worsened overnight despite repositioninig, heat and cold compress  SLIM aware with orders for maalox and lidocaine viscous orally given, also with minimal relief   Pt is scheduled for EGD this am

## 2020-09-18 NOTE — PLAN OF CARE
Problem: Potential for Falls  Goal: Patient will remain free of falls  Description: INTERVENTIONS:  - Assess patient frequently for physical needs  -  Identify cognitive and physical deficits and behaviors that affect risk of falls    -  [place] fall precautions as indicated by assessment   - Educate patient/family on patient safety including physical limitations  - Instruct patient to call for assistance with activity based on assessment  - Modify environment to reduce risk of injury  - Consider OT/PT consult to assist with strengthening/mobility  Outcome: Progressing     Problem: PAIN - ADULT  Goal: Verbalizes/displays adequate comfort level or baseline comfort level  Description: Interventions:  - Encourage patient to monitor pain and request assistance  - Assess pain using appropriate pain scale  - Administer analgesics based on type and severity of pain and evaluate response  - Implement non-pharmacological measures as appropriate and evaluate response  - Consider cultural and social influences on pain and pain management  - Notify physician/advanced practitioner if interventions unsuccessful or patient reports new pain  Outcome: Progressing     Problem: INFECTION - ADULT  Goal: Absence or prevention of progression during hospitalization  Description: INTERVENTIONS:  - Assess and monitor for signs and symptoms of infection  - Monitor lab/diagnostic results  - Monitor all insertion sites, i e  indwelling lines, tubes, and drains  - Monitor endotracheal if appropriate and nasal secretions for changes in amount and color  - [place] appropriate cooling/warming therapies per order  - Administer medications as ordered  - Instruct and encourage patient and family to use good hand hygiene technique  - Identify and instruct in appropriate isolation precautions for identified infection/condition  Outcome: Progressing  Goal: Absence of fever/infection during neutropenic period  Description: INTERVENTIONS:  - Monitor WBC    Outcome: Progressing     Problem: SAFETY ADULT  Goal: Patient will remain free of falls  Description: INTERVENTIONS:  - Assess patient frequently for physical needs  -  Identify cognitive and physical deficits and behaviors that affect risk of falls    -  [place] fall precautions as indicated by assessment   - Educate patient/family on patient safety including physical limitations  - Instruct patient to call for assistance with activity based on assessment  - Modify environment to reduce risk of injury  - Consider OT/PT consult to assist with strengthening/mobility  Outcome: Progressing  Goal: Maintain or return to baseline ADL function  Description: INTERVENTIONS:  -  Assess patient's ability to carry out ADLs; assess patient's baseline for ADL function and identify physical deficits which impact ability to perform ADLs (bathing, care of mouth/teeth, toileting, grooming, dressing, etc )  - Assess/evaluate cause of self-care deficits   - Assess range of motion  - Assess patient's mobility; develop plan if impaired  - Assess patient's need for assistive devices and provide as appropriate  - Encourage maximum independence but intervene and supervise when necessary  - Involve family in performance of ADLs  - Assess for home care needs following discharge   - Consider OT consult to assist with ADL evaluation and planning for discharge  - Provide patient education as appropriate  Outcome: Progressing  Goal: Maintain or return mobility status to optimal level  Description: INTERVENTIONS:  - Assess patient's baseline mobility status (ambulation, transfers, stairs, etc )    - Identify cognitive and physical deficits and behaviors that affect mobility  - Identify mobility aids required to assist with transfers and/or ambulation (gait belt, sit-to-stand, lift, walker, cane, etc )  - [place] fall precautions as indicated by assessment  - Record patient progress and toleration of activity level on Mobility SBAR; progress patient to next Phase/Stage  - Instruct patient to call for assistance with activity based on assessment  - Consider rehabilitation consult to assist with strengthening/weightbearing, etc   Outcome: Progressing

## 2020-09-18 NOTE — ANESTHESIA PREPROCEDURE EVALUATION
Procedure:  EGD    Relevant Problems   MUSCULOSKELETAL   (+) Acute back pain      Other   (+) Epigastric pain   (+) Nicotine dependence        Physical Exam    Airway    Mallampati score: II  TM Distance: >3 FB  Neck ROM: full     Dental       Cardiovascular  Cardiovascular exam normal    Pulmonary  Pulmonary exam normal     Other Findings      Anemia     COPD (chronic obstructive pulmonary disease) (HCC)     Hyperlipidemia     Atrial fibrillation (HCC)       SMOKER  Anesthesia Plan  ASA Score- 2     Anesthesia Type- IV sedation with anesthesia with ASA Monitors  Additional Monitors:   Airway Plan:           Plan Factors-    Chart reviewed  EKG reviewed  Existing labs reviewed  Induction- intravenous  Postoperative Plan-     Informed Consent- Anesthetic plan and risks discussed with patient  I personally reviewed this patient with the CRNA  Discussed and agreed on the Anesthesia Plan with the CRNA  Anil Perez

## 2020-09-18 NOTE — PROGRESS NOTES
Progress Note - Eddie Sandoval 1955, 60 y o  male MRN: [id number]    Unit/Bed#:  Encounter: [id number]    Primary Care Provider: Johanna Golden MD   Date and time admitted to hospital: 9/16/2020 10:08 PM        Acute back pain  Assessment & Plan  Patient reports acute onset severe back pain over the last 2-3 weeks for which she has been taking excessive NSAIDs  Will get x-ray of thoracic and lumbar spine  X-rays are still pending  Will provide with oxycodone, morphine and scheduled Tylenol for pain    Nicotine dependence  Assessment & Plan  Will give nicotine patch  Counseled on smoking cessation  BPH associated with nocturia  Assessment & Plan  Continue finasteride  Enlarged prostate noted on the CT of the abdomen  Will check PSA  * Epigastric pain  Assessment & Plan  4year-old man presenting with epigastric pain x2 days  Reportedly has had worsening acute back pain and has been taking 3 tablets of 600 mg ibuprofen every 4 hours as well as cortical steroid course  CT of the abdomen pelvis on admission did not demonstrate any evidence of acute abnormality  Labs including CMP lipase unremarkable  Troponin so far negative  EKG nonischemic    Likely peptic ulcer disease/gastritis in the setting of excess NSAID use  Currently on Protonix drip will continue  GI recommendations greatly appreciated  Patient scheduled for EGD later today  Serial abdominal exams  Monitor labs closely        VTE Pharmacologic Prophylaxis:   Pharmacologic: Ambulatory  Mechanical VTE Prophylaxis in Place: Yes    Patient Centered Rounds: I have performed bedside rounds with nursing staff today  Discussions with Specialists or Other Care Team Provider:  Nursing    Education and Discussions with Family / Patient:  Patient's daughter    Time Spent for Care: 30 minutes  More than 50% of total time spent on counseling and coordination of care as described above      Current Length of Stay: 0 day(s)    Current Patient Status: Inpatient   Certification Statement: The patient will continue to require additional inpatient hospital stay due to Back pain workup an EGD    Discharge Plan:  24-48 hours    Code Status: Level 1 - Full Code      Subjective:   Patient was seen and examined  He continues to report intractable pain located in his flanks in going up to his upper back  His pain is mostly localized in the flank area bilaterally  He also complains of periumbilical abdominal pain  He feels nauseous and has not been eating for the past 3 days  Objective:     Vitals:   Temp (24hrs), Av 1 °F (36 7 °C), Min:97 5 °F (36 4 °C), Max:98 7 °F (37 1 °C)    Temp:  [97 5 °F (36 4 °C)-98 7 °F (37 1 °C)] 97 7 °F (36 5 °C)  HR:  [64-98] 65  Resp:  [11-24] 11  BP: (125-144)/(70-81) 144/81  SpO2:  [96 %-99 %] 98 %  Body mass index is 20 84 kg/m²  Input and Output Summary (last 24 hours): Intake/Output Summary (Last 24 hours) at 2020 1331  Last data filed at 2020 1242  Gross per 24 hour   Intake 291 ml   Output 1075 ml   Net -784 ml       Physical Exam:     Physical Exam  Constitutional:       Appearance: He is underweight  Cardiovascular:      Rate and Rhythm: Normal rate and regular rhythm  Heart sounds: Normal heart sounds  Pulmonary:      Effort: Pulmonary effort is normal  No respiratory distress  Breath sounds: Normal breath sounds  Abdominal:      Palpations: Abdomen is soft  Tenderness: There is no abdominal tenderness  Musculoskeletal:         General: No deformity  Skin:     General: Skin is warm  Findings: No erythema or rash  Neurological:      Mental Status: He is alert             Additional Data:     Labs:    Results from last 7 days   Lab Units 20  2317   WBC Thousand/uL 8 90   HEMOGLOBIN g/dL 14 5   HEMATOCRIT % 45 1   PLATELETS Thousands/uL 290   NEUTROS PCT % 57   LYMPHS PCT % 33   MONOS PCT % 8   EOS PCT % 1     Results from last 7 days   Lab Units 09/16/20  2317   SODIUM mmol/L 138   POTASSIUM mmol/L 4 2   CHLORIDE mmol/L 102   CO2 mmol/L 30   BUN mg/dL 17   CREATININE mg/dL 0 88   ANION GAP mmol/L 6   CALCIUM mg/dL 9 7   ALBUMIN g/dL 3 9   TOTAL BILIRUBIN mg/dL 0 40   ALK PHOS U/L 62   ALT U/L 39   AST U/L 18   GLUCOSE RANDOM mg/dL 103                           * I Have Reviewed All Lab Data Listed Above  * Additional Pertinent Lab Tests Reviewed: All Labs Within Last 24 Hours Reviewed    Imaging:  CT abdomen pelvis  Recent Cultures (last 7 days):           Last 24 Hours Medication List:   Current Facility-Administered Medications   Medication Dose Route Frequency Provider Last Rate    acetaminophen  650 mg Oral Q6H [address] Yamilet Muhammad MD      dextrose 5 % and sodium chloride 0 9 %  75 mL/hr Intravenous Continuous Yamilet Muhammad MD 75 mL/hr (09/18/20 1256)    finasteride  5 mg Oral Daily Carol Knutson MD      lidocaine  1 patch Topical Daily Yamilet Muhammad MD      morphine injection  2 mg Intravenous Q4H PRN Yamilet Muhammad MD      nicotine  1 patch Transdermal Daily Carol Knutson MD      ondansetron  4 mg Intravenous Q6H PRN JOSE ANTONIO Huerta      oxyCODONE  5 mg Oral Q4H PRN Yamilet Muhammad MD      pantoprozole (PROTONIX) infusion (Continuous)  8 mg/hr Intravenous Continuous Jerri Xiong MD 8 mg/hr (09/18/20 0807)    sucralfate  1 g Oral 4x Daily (AC & HS) Carlo Knutson MD          Today, Patient Was Seen By: Yamilet Muhammad MD    ** Please Note: Dictation voice to text software may have been used in the creation of this document   **

## 2020-09-18 NOTE — UTILIZATION REVIEW
Continued Stay Review    OBS order 9/16 @ [phone] converted to IP on 9/18 @ 1116 for cont workup and treatment of nausea and pain     Admitting Physician  PERFECTO CAPPS     Level of Care  Med Surg     Estimated length of stay  More than 2 Midnights     Certification  I certify that inpatient services are medically necessary for this patient for a duration of greater than two midnights  See H&P and MD Progress Notes for additional information about the patient's course of treatment                  Date:               9/18          Current Patient Class: IP   Current Level of Care: tele     HPI:65 y o  male initially admitted on 9/16    Assessment/Plan: pt for EGD today , cont on protonix gtt , GI following  Monitor serial labs   Cont to c/o intractable pain located in his flanks and going into upper back also c/o periumbilical pain , feels nauseous and not been eating for the past 3 days   back pain , xrays still pending , provide oxycodone , morphine and tylenol  9/18 0542 Nursing Note   C/o severe nausea, dry heaving, went into brief SVT then back to SR/ST  Zofran given  SLIM aware  PRN fentanyl for abdominal pain with minimal relief  Pain worsened overnight despite repositioninig, heat and cold compress  SLIM aware with orders for maalox and lidocaine viscous orally given, also with minimal relief   Pt is scheduled for EGD this am    Pertinent Labs/Diagnostic Results:     Results from last 7 days   Lab Units 09/16/20  2317   WBC Thousand/uL 8 90   HEMOGLOBIN g/dL 14 5   HEMATOCRIT % 45 1   PLATELETS Thousands/uL 290   NEUTROS ABS Thousands/µL 5 15     Results from last 7 days   Lab Units 09/16/20  2317   SODIUM mmol/L 138   POTASSIUM mmol/L 4 2   CHLORIDE mmol/L 102   CO2 mmol/L 30   ANION GAP mmol/L 6   BUN mg/dL 17   CREATININE mg/dL 0 88   EGFR ml/min/1 73sq m 91   CALCIUM mg/dL 9 7     Results from last 7 days   Lab Units 09/16/20  2317   AST U/L 18   ALT U/L 39   ALK PHOS U/L 62   TOTAL PROTEIN g/dL 8 0 ALBUMIN g/dL 3 9   TOTAL BILIRUBIN mg/dL 0 40         Results from last 7 days   Lab Units 09/16/20  2317   GLUCOSE RANDOM mg/dL 103     Results from last 7 days   Lab Units 09/17/20  0202 09/16/20  2317   TROPONIN I ng/mL <0 02 <0 02       Results from last 7 days   Lab Units 09/16/20  2317   LIPASE u/L 105         Vital Signs:   09/18/20 0401   98 7 °F (37 1 °C)   98   24Abnormal     131/78            Lying    09/18/20 0000   98 2 °F (36 8 °C)   64   17   131/72      97 %   None (Room air)   Lyi         Medications:   Scheduled Medications:  finasteride, 5 mg, Oral, Daily  nicotine, 1 patch, Transdermal, Daily  sucralfate, 1 g, Oral, 4x Daily (AC & HS)      Continuous IV Infusions:  pantoprozole (PROTONIX) infusion (Continuous), 8 mg/hr, Intravenous, Continuous      PRN Meds:  Morphine 2 mg IV x1  fentanyl citrate (PF), 50 mcg, Intravenous, Q2H PRN 9/17  x3  9/18 x5  ondansetron, 4 mg, Intravenous, Q6H PRN 9/17 x1  9/18  x1    Discharge Plan: D    Network Utilization Review Department  [email or web address]  ATTENTION: Please call with any questions or concerns to [phone] and carefully listen to the prompts so that you are directed to the right person  All voicemails are confidential   Victorino Barth all requests for admission clinical reviews, approved or denied determinations and any other requests to dedicated fax number below belonging to the campus where the patient is receiving treatment   List of dedicated fax numbers for the Facilities:  FACILITY NAME UR FAX NUMBER   ADMISSION DENIALS (Administrative/Medical Necessity) [phone]   PARENT CHILD HEALTH (Maternity/NICU/Pediatrics) [phone]   Immanuel Stewart [phone]   Carlin Vallejo [address] [place] [address] Yash Patricia Chappell [phone]   [facility] [place] [phone] [address] [place] [phone]

## 2020-09-18 NOTE — ANESTHESIA POSTPROCEDURE EVALUATION
Post-Op Assessment Note    CV Status:  Stable  Pain Score: 0    Pain management: adequate     Mental Status:  Sleepy   Hydration Status:  Stable   PONV Controlled:  None   Airway Patency:  Patent and adequate      Post Op Vitals Reviewed: Yes      Staff: CRNA   Comments: 6LPM/Mask        No complications documented      BP   110/64   Temp      Pulse  77   Resp 18   SpO2 98

## 2020-09-18 NOTE — ASSESSMENT & PLAN NOTE
Patient reports acute onset severe back pain over the last 2-3 weeks for which she has been taking excessive NSAIDs  Will get x-ray of thoracic and lumbar spine  X-rays are still pending  Will provide with oxycodone, morphine and scheduled Tylenol for pain

## 2020-09-18 NOTE — ASSESSMENT & PLAN NOTE
4year-old man presenting with epigastric pain x2 days  Reportedly has had worsening acute back pain and has been taking 3 tablets of 600 mg ibuprofen every 4 hours as well as cortical steroid course  CT of the abdomen pelvis on admission did not demonstrate any evidence of acute abnormality  Labs including CMP lipase unremarkable  Troponin so far negative  EKG nonischemic    Likely peptic ulcer disease/gastritis in the setting of excess NSAID use  Currently on Protonix drip will continue  GI recommendations greatly appreciated    Patient scheduled for EGD later today  Serial abdominal exams  Monitor labs closely

## 2020-09-19 ENCOUNTER — APPOINTMENT (INPATIENT)
Dept: CT IMAGING | Facility: HOSPITAL | Age: 65
DRG: 241 | End: 2020-09-19
Payer: COMMERCIAL

## 2020-09-19 PROBLEM — Z87.898 HISTORY OF SOLITARY PULMONARY NODULE: Status: ACTIVE | Noted: 2020-09-19

## 2020-09-19 PROCEDURE — 99254 IP/OBS CNSLTJ NEW/EST MOD 60: CPT | Performed by: ORTHOPAEDIC SURGERY

## 2020-09-19 PROCEDURE — NC001 PR NO CHARGE: Performed by: PHYSICIAN ASSISTANT

## 2020-09-19 PROCEDURE — G1004 CDSM NDSC: HCPCS

## 2020-09-19 PROCEDURE — 99232 SBSQ HOSP IP/OBS MODERATE 35: CPT | Performed by: INTERNAL MEDICINE

## 2020-09-19 PROCEDURE — 71250 CT THORAX DX C-: CPT

## 2020-09-19 PROCEDURE — C9113 INJ PANTOPRAZOLE SODIUM, VIA: HCPCS | Performed by: EMERGENCY MEDICINE

## 2020-09-19 PROCEDURE — 99232 SBSQ HOSP IP/OBS MODERATE 35: CPT | Performed by: FAMILY MEDICINE

## 2020-09-19 RX ORDER — GABAPENTIN 300 MG/1
300 CAPSULE ORAL 3 TIMES DAILY
Status: DISCONTINUED | OUTPATIENT
Start: 2020-09-19 | End: 2020-09-19

## 2020-09-19 RX ORDER — LORAZEPAM 2 MG/ML
0.5 INJECTION INTRAMUSCULAR ONCE
Status: COMPLETED | OUTPATIENT
Start: 2020-09-19 | End: 2020-09-19

## 2020-09-19 RX ORDER — PANTOPRAZOLE SODIUM 40 MG/1
40 INJECTION, POWDER, FOR SOLUTION INTRAVENOUS EVERY 12 HOURS SCHEDULED
Status: DISCONTINUED | OUTPATIENT
Start: 2020-09-19 | End: 2020-09-19

## 2020-09-19 RX ORDER — METHOCARBAMOL 500 MG/1
500 TABLET, FILM COATED ORAL EVERY 6 HOURS PRN
Status: DISCONTINUED | OUTPATIENT
Start: 2020-09-19 | End: 2020-09-21 | Stop reason: HOSPADM

## 2020-09-19 RX ORDER — LANOLIN ALCOHOL/MO/W.PET/CERES
3 CREAM (GRAM) TOPICAL
Status: DISCONTINUED | OUTPATIENT
Start: 2020-09-19 | End: 2020-09-21 | Stop reason: HOSPADM

## 2020-09-19 RX ORDER — BACLOFEN 10 MG/1
10 TABLET ORAL 3 TIMES DAILY
Status: DISCONTINUED | OUTPATIENT
Start: 2020-09-19 | End: 2020-09-19

## 2020-09-19 RX ORDER — PANTOPRAZOLE SODIUM 40 MG/1
40 TABLET, DELAYED RELEASE ORAL
Status: DISCONTINUED | OUTPATIENT
Start: 2020-09-19 | End: 2020-09-21 | Stop reason: HOSPADM

## 2020-09-19 RX ORDER — GABAPENTIN 250 MG/5ML
300 SOLUTION ORAL 3 TIMES DAILY
Status: DISCONTINUED | OUTPATIENT
Start: 2020-09-19 | End: 2020-09-21 | Stop reason: HOSPADM

## 2020-09-19 RX ORDER — SUCRALFATE 1 G/1
1 TABLET ORAL
Status: DISCONTINUED | OUTPATIENT
Start: 2020-09-19 | End: 2020-09-21 | Stop reason: HOSPADM

## 2020-09-19 RX ORDER — DIAZEPAM 5 MG/1
5 TABLET ORAL EVERY 6 HOURS PRN
Status: DISCONTINUED | OUTPATIENT
Start: 2020-09-19 | End: 2020-09-21 | Stop reason: HOSPADM

## 2020-09-19 RX ORDER — CYCLOBENZAPRINE HCL 10 MG
10 TABLET ORAL 3 TIMES DAILY
Status: DISCONTINUED | OUTPATIENT
Start: 2020-09-19 | End: 2020-09-19

## 2020-09-19 RX ORDER — CYCLOBENZAPRINE HCL 10 MG
10 TABLET ORAL 3 TIMES DAILY PRN
Status: DISCONTINUED | OUTPATIENT
Start: 2020-09-19 | End: 2020-09-19

## 2020-09-19 RX ORDER — METHYLPREDNISOLONE SODIUM SUCCINATE 125 MG/2ML
80 INJECTION, POWDER, LYOPHILIZED, FOR SOLUTION INTRAMUSCULAR; INTRAVENOUS ONCE
Status: COMPLETED | OUTPATIENT
Start: 2020-09-19 | End: 2020-09-19

## 2020-09-19 RX ORDER — LIDOCAINE 50 MG/G
1 PATCH TOPICAL DAILY
Status: DISCONTINUED | OUTPATIENT
Start: 2020-09-19 | End: 2020-09-21 | Stop reason: HOSPADM

## 2020-09-19 RX ADMIN — MORPHINE SULFATE 2 MG: 2 INJECTION, SOLUTION INTRAMUSCULAR; INTRAVENOUS at 06:13

## 2020-09-19 RX ADMIN — CYCLOBENZAPRINE HYDROCHLORIDE 10 MG: 10 TABLET, FILM COATED ORAL at 09:30

## 2020-09-19 RX ADMIN — GABAPENTIN 300 MG: 250 SOLUTION ORAL at 22:04

## 2020-09-19 RX ADMIN — METHOCARBAMOL TABLETS 500 MG: 500 TABLET, COATED ORAL at 23:39

## 2020-09-19 RX ADMIN — METHOCARBAMOL TABLETS 500 MG: 500 TABLET, COATED ORAL at 15:01

## 2020-09-19 RX ADMIN — MORPHINE SULFATE 1 MG: 2 INJECTION, SOLUTION INTRAMUSCULAR; INTRAVENOUS at 15:00

## 2020-09-19 RX ADMIN — LIDOCAINE 5% 1 PATCH: 700 PATCH TOPICAL at 09:29

## 2020-09-19 RX ADMIN — SUCRALFATE 1 G: 1 TABLET ORAL at 16:34

## 2020-09-19 RX ADMIN — ONDANSETRON 4 MG: 2 INJECTION INTRAMUSCULAR; INTRAVENOUS at 05:48

## 2020-09-19 RX ADMIN — GABAPENTIN 300 MG: 250 SOLUTION ORAL at 16:33

## 2020-09-19 RX ADMIN — MELATONIN 3 MG: 3 TAB ORAL at 22:05

## 2020-09-19 RX ADMIN — MORPHINE SULFATE 2 MG: 2 INJECTION, SOLUTION INTRAMUSCULAR; INTRAVENOUS at 13:09

## 2020-09-19 RX ADMIN — PANTOPRAZOLE SODIUM 40 MG: 40 TABLET, DELAYED RELEASE ORAL at 16:34

## 2020-09-19 RX ADMIN — ACETAMINOPHEN 650 MG: 650 SUSPENSION ORAL at 16:33

## 2020-09-19 RX ADMIN — MORPHINE SULFATE 2 MG: 2 INJECTION, SOLUTION INTRAMUSCULAR; INTRAVENOUS at 23:38

## 2020-09-19 RX ADMIN — SUCRALFATE 1 G: 1 TABLET ORAL at 11:35

## 2020-09-19 RX ADMIN — ACETAMINOPHEN 650 MG: 650 SUSPENSION ORAL at 22:04

## 2020-09-19 RX ADMIN — LIDOCAINE 5% 1 PATCH: 700 PATCH TOPICAL at 09:30

## 2020-09-19 RX ADMIN — METHYLPREDNISOLONE SODIUM SUCCINATE 80 MG: 125 INJECTION, POWDER, FOR SOLUTION INTRAMUSCULAR; INTRAVENOUS at 13:14

## 2020-09-19 RX ADMIN — ONDANSETRON 4 MG: 2 INJECTION INTRAMUSCULAR; INTRAVENOUS at 12:02

## 2020-09-19 RX ADMIN — DIAZEPAM 5 MG: 5 TABLET ORAL at 23:44

## 2020-09-19 RX ADMIN — GABAPENTIN 300 MG: 250 SOLUTION ORAL at 11:54

## 2020-09-19 RX ADMIN — LORAZEPAM 0.5 MG: 2 INJECTION INTRAMUSCULAR; INTRAVENOUS at 02:30

## 2020-09-19 RX ADMIN — MORPHINE SULFATE 2 MG: 2 INJECTION, SOLUTION INTRAMUSCULAR; INTRAVENOUS at 02:14

## 2020-09-19 RX ADMIN — SODIUM CHLORIDE 8 MG/HR: 9 INJECTION, SOLUTION INTRAVENOUS at 06:14

## 2020-09-19 RX ADMIN — SUCRALFATE 1 G: 1 TABLET ORAL at 22:05

## 2020-09-19 NOTE — PROGRESS NOTES
Progress Note - Tammie Atkinson 1955, 60 y o  male MRN: [id number]    Unit/Bed#: -01 Encounter: [id number]    Primary Care Provider: Joslyn Lr MD   Date and time admitted to hospital: 9/16/2020 10:08 PM        History of solitary pulmonary nodule  Assessment & Plan  Patient is at high risk for lung cancer  Has a distant history of pulmonary nodule  Continues to smoke tobacco   Will proceed with CT chest     Severe protein-calorie malnutrition ([address] )  Assessment & Plan  Malnutrition Findings:         Patient reports no p o  Intake at all for the past 3 days due to severe pain and, specifically, abdominal pain  Patient was found to have gastritis based on the EGD report  His BMI is already 20 8 which is consider underweight for his age  There is, however, no acute weight loss reported by the patient  I would recommend that patient start nutritional supplements and nutrition consult  BMI Findings: Body mass index is 20 84 kg/m²  Acute back pain  Assessment & Plan  Patient reports acute onset severe back pain over the last 2-3 weeks for which she has been taking excessive NSAIDs  Reviewed x-ray of thoracic and lumbar spine and they unremarkable, except for degenerative disease of the lumbar spine  Patient reports nausea with oxycodone, but continues to take morphine quite frequently and continue with scheduled Tylenol for pain  Also, discussed with orthopedics and GI and they have agreed to provide the patient with a loading dose of IV steroids  In addition, started on gabapentin, a muscle relaxant, diazepam and Lidoderm patch  PT OT ordered  Discussing rehab  Nicotine dependence  Assessment & Plan  Will give nicotine patch  Counseled on smoking cessation  Patient is at risk for lung cancer  He has not had routine maintenance CT of the chest for screening purposes  Will do CT of the chest while he is in hospital   He does have a history of pulmonary nodule      BPH associated with nocturia  Assessment & Plan  Continue finasteride  Enlarged prostate noted on the CT of the abdomen  Will check PSA  * Epigastric pain  Assessment & Plan  4year-old man presenting with epigastric pain x2 days  Reportedly has had worsening acute back pain and has been taking 3 tablets of 600 mg ibuprofen every 4 hours as well as cortical steroid course  CT of the abdomen pelvis on admission did not demonstrate any evidence of acute abnormality  Labs including CMP lipase unremarkable  Troponin so far negative  EKG nonischemic    Likely peptic ulcer disease/gastritis in the setting of excess NSAID use  Currently on Protonix twice a day  GI recommendations greatly appreciated  Status post EGD on   Revealed NSAID induced gastric irritation  Continue Protonix and Carafate      VTE Pharmacologic Prophylaxis:   Pharmacologic: Ambulate  Mechanical VTE Prophylaxis in Place: Yes    Patient Centered Rounds: I have performed bedside rounds with nursing staff today  Discussions with Specialists or Other Care Team Provider:  Orthopedics and GI    Education and Discussions with Family / Patient:  Patient and his daughter    Time Spent for Care: 30 minutes  More than 50% of total time spent on counseling and coordination of care as described above  Current Length of Stay: 1 day(s)    Current Patient Status: Inpatient   Certification Statement: The patient will continue to require additional inpatient hospital stay due to Intractable back pain    Discharge Plan:  48 hours    Code Status: Level 1 - Full Code      Subjective:   Patient was seen and examined  He continues to report intractable back pain and continues to experience abdominal pain      Objective:     Vitals:   Temp (24hrs), Av 2 °F (36 8 °C), Min:97 9 °F (36 6 °C), Max:99 °F (37 2 °C)    Temp:  [97 9 °F (36 6 °C)-99 °F (37 2 °C)] 97 9 °F (36 6 °C)  HR:  [70-84] 74  Resp:  [16-22] 16  BP: (112-160)/(66-88) 136/87  SpO2:  [96 %-100 %] 100 %  Body mass index is 20 84 kg/m²  Input and Output Summary (last 24 hours): Intake/Output Summary (Last 24 hours) at 9/19/2020 1340  Last data filed at 9/19/2020 0747  Gross per 24 hour   Intake 778 67 ml   Output 825 ml   Net -46 33 ml       Physical Exam:     Physical Exam  Constitutional:       Appearance: He is well-developed and underweight  HENT:      Head: Normocephalic and atraumatic  Cardiovascular:      Rate and Rhythm: Normal rate and regular rhythm  Heart sounds: Normal heart sounds  Pulmonary:      Effort: Pulmonary effort is normal  No respiratory distress  Breath sounds: Normal breath sounds  Abdominal:      General: There is no distension  Palpations: Abdomen is soft  Musculoskeletal:         General: No deformity  Skin:     General: Skin is warm  Findings: No erythema or rash  Neurological:      Motor: No weakness  Additional Data:     Labs:    Results from last 7 days   Lab Units 09/16/20  2317   WBC Thousand/uL 8 90   HEMOGLOBIN g/dL 14 5   HEMATOCRIT % 45 1   PLATELETS Thousands/uL 290   NEUTROS PCT % 57   LYMPHS PCT % 33   MONOS PCT % 8   EOS PCT % 1     Results from last 7 days   Lab Units 09/16/20  2317   SODIUM mmol/L 138   POTASSIUM mmol/L 4 2   CHLORIDE mmol/L 102   CO2 mmol/L 30   BUN mg/dL 17   CREATININE mg/dL 0 88   ANION GAP mmol/L 6   CALCIUM mg/dL 9 7   ALBUMIN g/dL 3 9   TOTAL BILIRUBIN mg/dL 0 40   ALK PHOS U/L 62   ALT U/L 39   AST U/L 18   GLUCOSE RANDOM mg/dL 103         Results from last 7 days   Lab Units 09/18/20  2110   POC GLUCOSE mg/dl 112                   * I Have Reviewed All Lab Data Listed Above  * Additional Pertinent Lab Tests Reviewed:  All Labs Within Last 24 Hours Reviewed    Imaging:    CT chest    Recent Cultures (last 7 days):           Last 24 Hours Medication List:   Current Facility-Administered Medications   Medication Dose Route Frequency Provider Last Rate    acetaminophen  650 mg Oral Niki Van MD      diazepam  5 mg Oral Q6H PRN Miguel Kyle MD      finasteride  5 mg Oral Daily Miguel Kyle MD      gabapentin  300 mg Oral TID Miguel Kyle MD      lidocaine  1 patch Topical Daily Miguel Kyle MD      lidocaine  1 patch Topical Daily Miguel Kyle MD      melatonin  3 mg Oral HS Miguel Kyle MD      methocarbamol  500 mg Oral Q6H PRN Miguel Kyle MD      morphine injection  1 mg Intravenous Q1H PRN Miguel Kyle MD      morphine injection  2 mg Intravenous Q4H PRN Miguel Kyle MD      nicotine  1 patch Transdermal Daily Miguel Kyle MD      ondansetron  4 mg Intravenous Q6H PRN Miguel Kyle MD      pantoprazole  40 mg Oral BID AC Miguel Kyle MD      sucralfate  1 g Oral 4x Daily (AC & HS) Miguel Kyle MD          Today, Patient Was Seen By: Miguel Kyle MD    ** Please Note: Dictation voice to text software may have been used in the creation of this document   **

## 2020-09-19 NOTE — ASSESSMENT & PLAN NOTE
Patient is at high risk for lung cancer  Has a distant history of pulmonary nodule  Continues to smoke tobacco   CT chest revealed a 2 mm stable pulmonary nodule

## 2020-09-19 NOTE — ASSESSMENT & PLAN NOTE
Malnutrition Findings:         Patient reports no p o  Intake at all for the past 3 days due to severe pain and, specifically, abdominal pain  Patient was found to have gastritis based on the EGD report  His BMI is already 20 8 which is consider underweight for his age  There is, however, no acute weight loss reported by the patient  I would recommend that patient start nutritional supplements and nutrition consult  BMI Findings: Body mass index is 20 84 kg/m²

## 2020-09-19 NOTE — PLAN OF CARE
Problem: Potential for Falls  Goal: Patient will remain free of falls  Description: INTERVENTIONS:  - Assess patient frequently for physical needs  -  Identify cognitive and physical deficits and behaviors that affect risk of falls    -  [place] fall precautions as indicated by assessment   - Educate patient/family on patient safety including physical limitations  - Instruct patient to call for assistance with activity based on assessment  - Modify environment to reduce risk of injury  - Consider OT/PT consult to assist with strengthening/mobility  Outcome: Progressing     Problem: PAIN - ADULT  Goal: Verbalizes/displays adequate comfort level or baseline comfort level  Description: Interventions:  - Encourage patient to monitor pain and request assistance  - Assess pain using appropriate pain scale  - Administer analgesics based on type and severity of pain and evaluate response  - Implement non-pharmacological measures as appropriate and evaluate response  - Consider cultural and social influences on pain and pain management  - Notify physician/advanced practitioner if interventions unsuccessful or patient reports new pain  Outcome: Progressing     Problem: INFECTION - ADULT  Goal: Absence or prevention of progression during hospitalization  Description: INTERVENTIONS:  - Assess and monitor for signs and symptoms of infection  - Monitor lab/diagnostic results  - Monitor all insertion sites, i e  indwelling lines, tubes, and drains  - Monitor endotracheal if appropriate and nasal secretions for changes in amount and color  - [place] appropriate cooling/warming therapies per order  - Administer medications as ordered  - Instruct and encourage patient and family to use good hand hygiene technique  - Identify and instruct in appropriate isolation precautions for identified infection/condition  Outcome: Progressing  Goal: Absence of fever/infection during neutropenic period  Description: INTERVENTIONS:  - Monitor WBC    Outcome: Progressing     Problem: SAFETY ADULT  Goal: Patient will remain free of falls  Description: INTERVENTIONS:  - Assess patient frequently for physical needs  -  Identify cognitive and physical deficits and behaviors that affect risk of falls    -  [place] fall precautions as indicated by assessment   - Educate patient/family on patient safety including physical limitations  - Instruct patient to call for assistance with activity based on assessment  - Modify environment to reduce risk of injury  - Consider OT/PT consult to assist with strengthening/mobility  Outcome: Progressing  Goal: Maintain or return to baseline ADL function  Description: INTERVENTIONS:  -  Assess patient's ability to carry out ADLs; assess patient's baseline for ADL function and identify physical deficits which impact ability to perform ADLs (bathing, care of mouth/teeth, toileting, grooming, dressing, etc )  - Assess/evaluate cause of self-care deficits   - Assess range of motion  - Assess patient's mobility; develop plan if impaired  - Assess patient's need for assistive devices and provide as appropriate  - Encourage maximum independence but intervene and supervise when necessary  - Involve family in performance of ADLs  - Assess for home care needs following discharge   - Consider OT consult to assist with ADL evaluation and planning for discharge  - Provide patient education as appropriate  Outcome: Progressing  Goal: Maintain or return mobility status to optimal level  Description: INTERVENTIONS:  - Assess patient's baseline mobility status (ambulation, transfers, stairs, etc )    - Identify cognitive and physical deficits and behaviors that affect mobility  - Identify mobility aids required to assist with transfers and/or ambulation (gait belt, sit-to-stand, lift, walker, cane, etc )  - [place] fall precautions as indicated by assessment  - Record patient progress and toleration of activity level on Mobility SBAR; progress patient to next Phase/Stage  - Instruct patient to call for assistance with activity based on assessment  - Consider rehabilitation consult to assist with strengthening/weightbearing, etc   Outcome: Progressing     Problem: DISCHARGE PLANNING  Goal: Discharge to home or other facility with appropriate resources  Description: INTERVENTIONS:  - Identify barriers to discharge w/patient and caregiver  - Arrange for needed discharge resources and transportation as appropriate  - Identify discharge learning needs (meds, wound care, etc )  - Arrange for interpretive services to assist at discharge as needed  - Refer to Case Management Department for coordinating discharge planning if the patient needs post-hospital services based on physician/advanced practitioner order or complex needs related to functional status, cognitive ability, or social support system  Outcome: Progressing

## 2020-09-19 NOTE — PROGRESS NOTES
Progress Note  Eddie Sandoval 60 y o  male MRN: [id number]  Unit/Bed#:  Encounter: [id number]    Subjective:  Patient reports abdominal discomfort with eating and nausea  No vomiting  No melena  +Back pain  Objective:     Vitals:   Vitals:    09/19/20 0747   BP: 144/88   Pulse: 82   Resp: 16   Temp: 98 1 °F (36 7 °C)   SpO2: 98%   ,Body mass index is 20 84 kg/m²        Intake/Output Summary (Last 24 hours) at 9/19/2020 0824  Last data filed at 9/19/2020 0747  Gross per 24 hour   Intake 778 67 ml   Output 1325 ml   Net -546 33 ml       Physical Exam:     General Appearance: Alert, appears stated age and cooperative  Lungs: Clear to auscultation bilaterally, no rales or rhonchi, no labored breathing/accessory muscle use  Heart: Regular rate and rhythm, S1, S2 normal, no murmur, click, rub or gallop  Abdomen: Soft, non-tender, non-distended; bowel sounds normal; no masses or no organomegaly  Extremities: No cyanosis, edema    Invasive Devices     Peripheral Intravenous Line            Peripheral IV 09/18/20 Right Forearm 1 day    Peripheral IV 09/18/20 Left Hand less than 1 day                Lab Results:  Admission on 09/16/2020   Component Date Value    WBC 09/16/2020 8 90     RBC 09/16/2020 4 92     Hemoglobin 09/16/2020 14 5     Hematocrit 09/16/2020 45 1     MCV 09/16/2020 92     MCH 09/16/2020 29 5     MCHC 09/16/2020 32 2     RDW 09/16/2020 13 3     MPV 09/16/2020 9 4     Platelets 65/26/8477 290     nRBC 09/16/2020 0     Neutrophils Relative 09/16/2020 57     Immat GRANS % 09/16/2020 0     Lymphocytes Relative 09/16/2020 33     Monocytes Relative 09/16/2020 8     Eosinophils Relative 09/16/2020 1     Basophils Relative 09/16/2020 1     Neutrophils Absolute 09/16/2020 5 15     Immature Grans Absolute 09/16/2020 0 04     Lymphocytes Absolute 09/16/2020 2 89     Monocytes Absolute 09/16/2020 0 67     Eosinophils Absolute 09/16/2020 0 06     Basophils Absolute 09/16/2020 0 09  Sodium 09/16/2020 138     Potassium 09/16/2020 4 2     Chloride 09/16/2020 102     CO2 09/16/2020 30     ANION GAP 09/16/2020 6     BUN 09/16/2020 17     Creatinine 09/16/2020 0 88     Glucose 09/16/2020 103     Calcium 09/16/2020 9 7     AST 09/16/2020 18     ALT 09/16/2020 39     Alkaline Phosphatase 09/16/2020 62     Total Protein 09/16/2020 8 0     Albumin 09/16/2020 3 9     Total Bilirubin 09/16/2020 0 40     eGFR 09/16/2020 91     Lipase 09/16/2020 105     Troponin I 09/16/2020 <0 02     Troponin I 09/17/2020 <0 02     Ventricular Rate 09/17/2020 74     Atrial Rate 09/17/2020 74     MI Interval 09/17/2020 148     QRSD Interval 09/17/2020 126     QT Interval 09/17/2020 404     QTC Interval 09/17/2020 448     P Axis 09/17/2020 66     QRS Axis 09/17/2020 81     T Wave Axis 09/17/2020 59     Sed Rate 09/18/2020 19     POC Glucose 09/18/2020 112        Imaging Studies:   I have personally reviewed pertinent imaging studies  Xr Spine Thoracic 3 Vw    Result Date: 9/19/2020  Narrative: THORACIC SPINE INDICATION:   acute onset back pain  COMPARISON:  Chest CT from 1/6/2016  VIEWS:  XR SPINE THORACIC 3 VW FINDINGS: There is no fracture or pathologic bone lesion  Thoracic vertebral alignment is within normal limits  Age related degenerative changes are seen  There is no displacement of the paraspinal line  The pedicles appear intact  Impression: No acute osseous abnormality  Age related degenerative changes are seen  Workstation performed: [id number]     Xr Spine Lumbar Minimum 4 Views Non Injury    Result Date: 9/19/2020  Narrative: LUMBAR SPINE INDICATION:   acute onset back pain  COMPARISON:  Abdomen and pelvic CT from 9/17/2020  VIEWS:  XR SPINE LUMBAR MINIMUM 4 VIEWS NON INJURY FINDINGS: There are 5 non rib bearing lumbar vertebral bodies  There is no evidence of acute fracture or destructive osseous lesion  Alignment is unremarkable   Severe degenerative facet disease at L3-L4, L4-L5, and L5-S1 bilaterally  Severe degenerative disc space narrowing at L5-S1  The pedicles appear intact  There are atherosclerotic calcifications  Soft tissues are otherwise unremarkable  Impression: No acute osseous abnormality  Degenerative changes as described  Workstation performed: [id number]     Ct Abdomen Pelvis With Contrast    Result Date: 9/17/2020  Narrative: CT ABDOMEN AND PELVIS WITH IV CONTRAST INDICATION:   Epigastric pain  COMPARISON:  None  TECHNIQUE:  CT examination of the abdomen and pelvis was performed  Axial, sagittal, and coronal 2D reformatted images were created from the source data and submitted for interpretation  Radiation dose length product (DLP) for this visit:  413 mGy-cm   This examination, like all CT scans performed in the [facility], was performed utilizing techniques to minimize radiation dose exposure, including the use of iterative reconstruction and automated exposure control  IV Contrast:  100 mL of iohexol (OMNIPAQUE)  was administered intravenously without immediate adverse reaction  Enteric Contrast:  Enteric contrast was not administered  FINDINGS: ABDOMEN LOWER CHEST:  No clinically significant abnormality identified in the visualized lower chest  LIVER/BILIARY TREE:  Unremarkable  GALLBLADDER:  No calcified gallstones  No pericholecystic inflammatory change  SPLEEN:  Unremarkable  PANCREAS:  Unremarkable  ADRENAL GLANDS:  Unremarkable  KIDNEYS/URETERS:  No hydronephrosis or urinary tract calculus  One or more sharply circumscribed subcentimeter renal hypodensities are present, too small to accurately characterize, and statistically most likely benign findings  According to recent literature (Radiology 2019) no further workup of these findings is recommended  STOMACH AND BOWEL:  No bowel obstruction  APPENDIX:  A normal appendix was visualized  ABDOMINOPELVIC CAVITY:  No ascites  No pneumoperitoneum  No lymphadenopathy   VESSELS: Moderate atherosclerotic calcifications  PELVIS REPRODUCTIVE ORGANS:  Enlarged prostate  URINARY BLADDER:  Unremarkable  ABDOMINAL WALL/INGUINAL REGIONS:  Unremarkable  OSSEOUS STRUCTURES:  No acute fracture or destructive osseous lesion  Disc space narrowing at L5-S1 with sclerotic endplate changes  Impression: No acute inflammatory process identified within the abdomen or pelvis  Workstation performed: [id number]         Assessment & Plan    Gastric Ulcers/Erosions  Epigastric Abdominal Pain    - EGD yesterday revealed multiple antra erosions and ulceration as well as duodenal erosion secondary to NSAID induced injury  - Follow up path to rule out h pylori  - Protonix 40mg BID x 3 months  - Carafate slurry 1 gm QID   - No NSAIDs   - Non ulcerogenic diet  - Recommend follow up in the office in 2-3 weeks  Will sign off, please reconsult or call with any concerns    The patient will be seen by Dr Lisseth Staples PA-C  9/19/2020,8:24 AM

## 2020-09-19 NOTE — ASSESSMENT & PLAN NOTE
4year-old man presenting with epigastric pain x2 days  Reportedly has had worsening acute back pain and has been taking 3 tablets of 600 mg ibuprofen every 4 hours as well as cortical steroid course  CT of the abdomen pelvis on admission did not demonstrate any evidence of acute abnormality  Labs including CMP lipase unremarkable  Troponin so far negative  EKG nonischemic    Likely peptic ulcer disease/gastritis in the setting of excess NSAID use  Currently on Protonix twice a day  GI recommendations greatly appreciated    Status post EGD on 09/18  Revealed NSAID induced gastric irritation  Continue Protonix and Carafate

## 2020-09-19 NOTE — PLAN OF CARE
Problem: Potential for Falls  Goal: Patient will remain free of falls  Description: INTERVENTIONS:  - Assess patient frequently for physical needs  -  Identify cognitive and physical deficits and behaviors that affect risk of falls    -  [place] fall precautions as indicated by assessment   - Educate patient/family on patient safety including physical limitations  - Instruct patient to call for assistance with activity based on assessment  - Modify environment to reduce risk of injury  - Consider OT/PT consult to assist with strengthening/mobility  Outcome: Progressing     Problem: PAIN - ADULT  Goal: Verbalizes/displays adequate comfort level or baseline comfort level  Description: Interventions:  - Encourage patient to monitor pain and request assistance  - Assess pain using appropriate pain scale  - Administer analgesics based on type and severity of pain and evaluate response  - Implement non-pharmacological measures as appropriate and evaluate response  - Consider cultural and social influences on pain and pain management  - Notify physician/advanced practitioner if interventions unsuccessful or patient reports new pain  Outcome: Progressing     Problem: INFECTION - ADULT  Goal: Absence or prevention of progression during hospitalization  Description: INTERVENTIONS:  - Assess and monitor for signs and symptoms of infection  - Monitor lab/diagnostic results  - Monitor all insertion sites, i e  indwelling lines, tubes, and drains  - Monitor endotracheal if appropriate and nasal secretions for changes in amount and color  - [place] appropriate cooling/warming therapies per order  - Administer medications as ordered  - Instruct and encourage patient and family to use good hand hygiene technique  - Identify and instruct in appropriate isolation precautions for identified infection/condition  Outcome: Progressing  Goal: Absence of fever/infection during neutropenic period  Description: INTERVENTIONS:  - Monitor WBC    Outcome: Progressing     Problem: SAFETY ADULT  Goal: Patient will remain free of falls  Description: INTERVENTIONS:  - Assess patient frequently for physical needs  -  Identify cognitive and physical deficits and behaviors that affect risk of falls    -  [place] fall precautions as indicated by assessment   - Educate patient/family on patient safety including physical limitations  - Instruct patient to call for assistance with activity based on assessment  - Modify environment to reduce risk of injury  - Consider OT/PT consult to assist with strengthening/mobility  Outcome: Progressing  Goal: Maintain or return to baseline ADL function  Description: INTERVENTIONS:  -  Assess patient's ability to carry out ADLs; assess patient's baseline for ADL function and identify physical deficits which impact ability to perform ADLs (bathing, care of mouth/teeth, toileting, grooming, dressing, etc )  - Assess/evaluate cause of self-care deficits   - Assess range of motion  - Assess patient's mobility; develop plan if impaired  - Assess patient's need for assistive devices and provide as appropriate  - Encourage maximum independence but intervene and supervise when necessary  - Involve family in performance of ADLs  - Assess for home care needs following discharge   - Consider OT consult to assist with ADL evaluation and planning for discharge  - Provide patient education as appropriate  Outcome: Progressing  Goal: Maintain or return mobility status to optimal level  Description: INTERVENTIONS:  - Assess patient's baseline mobility status (ambulation, transfers, stairs, etc )    - Identify cognitive and physical deficits and behaviors that affect mobility  - Identify mobility aids required to assist with transfers and/or ambulation (gait belt, sit-to-stand, lift, walker, cane, etc )  - [place] fall precautions as indicated by assessment  - Record patient progress and toleration of activity level on Mobility SBAR; progress patient to next Phase/Stage  - Instruct patient to call for assistance with activity based on assessment  - Consider rehabilitation consult to assist with strengthening/weightbearing, etc   Outcome: Progressing     Problem: DISCHARGE PLANNING  Goal: Discharge to home or other facility with appropriate resources  Description: INTERVENTIONS:  - Identify barriers to discharge w/patient and caregiver  - Arrange for needed discharge resources and transportation as appropriate  - Identify discharge learning needs (meds, wound care, etc )  - Arrange for interpretive services to assist at discharge as needed  - Refer to Case Management Department for coordinating discharge planning if the patient needs post-hospital services based on physician/advanced practitioner order or complex needs related to functional status, cognitive ability, or social support system  Outcome: Progressing     Problem: Knowledge Deficit  Goal: Patient/family/caregiver demonstrates understanding of disease process, treatment plan, medications, and discharge instructions  Description: Complete learning assessment and assess knowledge base    Interventions:  - Provide teaching at level of understanding  - Provide teaching via preferred learning methods  Outcome: Progressing

## 2020-09-19 NOTE — ASSESSMENT & PLAN NOTE
Will give nicotine patch  Counseled on smoking cessation  Patient is at risk for lung cancer  He has not had routine maintenance CT of the chest for screening purposes  Will do CT of the chest while he is in hospital   He does have a history of pulmonary nodule

## 2020-09-19 NOTE — CONSULTS
Orthopedics   Tammie Atkinson 60 y o  male MRN: [id number]  Unit/Bed#: MO CT SCAN      Chief Complaint:   Back Pain    HPI:   60 y o male complaining of Cervical, Thoracic and Lumbar back pain  Patient is Conchita Ziegler presented to the emergency room for epigastric pain due to having taken significant ibuprofen and steroids due to back pain  He states that the back pain has been going on for 2-3 weeks  He denies any injuries or trauma to the area  He states that it starts at his lumbar spine and radiates to the neck  Upon examination today he was able to move about the bed with minimal discomfort  He denies any numbness or tingling into the extremity  He denies any chest pain, shortness of breath, nausea, vomiting, diarrhea, lightheadedness, dizziness      Review Of Systems:   · Skin: Normal  · Neuro: See HPI  · Musculoskeletal: See HPI  · 14 point review of systems negative except as stated above     Past Medical History:   Past Medical History:   Diagnosis Date    Anemia     [facility]rial fibrillation [facility] [address] [facility]     COPD (chronic obstructive pulmonary disease) ([facility] [address] [facility]     Hernia cerebri (HCC)     Hyperlipidemia     Lung nodule     RESOLVED 26 JUN 2016    Renal calculi     Sexual dysfunction        Past Surgical History:   Past Surgical History:   Procedure Laterality Date    FACIAL COSMETIC SURGERY      pt has metal hardware throughout face    FACIAL COSMETIC SURGERY      KIDNEY SURGERY      ORBITAL FRACTURE SURGERY      CLOSED TREATMENT OF ORBITAL FRACTURE(NON-BLOWOUT)    TONSILLECTOMY      VASECTOMY         Family History:  Family history reviewed and non-contributory  Family History   Problem Relation Age of Onset    Stroke Mother     Diabetes Mother     Hypertension Mother     Skin cancer Mother     Heart disease Mother     Scoliosis Sister        Social History:  Social History     Socioeconomic History    Marital status: /Civil Union     Spouse name: None    Number of children: None    Years of education: None    Highest education level: None   Occupational History    None   Social Needs    Financial resource strain: None    Food insecurity     Worry: None     Inability: None    Transportation needs     Medical: None     Non-medical: None   Tobacco Use    Smoking status: Current Every Day Smoker     Packs/day: 1 00     Types: Cigarettes    Smokeless tobacco: Former User     Quit date: 9/17/2020   Substance and Sexual Activity    Alcohol use: Never     Frequency: Never     Drinks per session: Patient refused     Binge frequency: Never    Drug use: No    Sexual activity: None   Lifestyle    Physical activity     Days per week: None     Minutes per session: None    Stress: None   Relationships    Social connections     Talks on phone: None     Gets together: None     Attends Synagogue service: None     Active member of club or organization: None     Attends meetings of clubs or organizations: None     Relationship status: None    Intimate partner violence     Fear of current or ex partner: None     Emotionally abused: None     Physically abused: None     Forced sexual activity: None   Other Topics Concern    None   Social History Narrative    None       Allergies:    Allergies   Allergen Reactions    Other Anaphylaxis     Shellfish, shrimp    Dilaudid [Hydromorphone]     Oxycodone            Labs:  0   Lab Value Date/Time    HCT 45 1 09/16/2020 2317    HCT 39 3 09/22/2015 0614    HCT 38 2 09/21/2015 0643    HCT 40 9 09/20/2015 1328    HGB 14 5 09/16/2020 2317    HGB 13 2 09/22/2015 0614    HGB 13 0 09/21/2015 0643    HGB 14 1 09/20/2015 1328    WBC 8 90 09/16/2020 2317    WBC 8 40 09/22/2015 0614    WBC 6 47 09/21/2015 0643    WBC 7 47 09/20/2015 1328    ESR 19 09/18/2020 1657       Meds:    Current Facility-Administered Medications:     acetaminophen (TYLENOL) oral suspension 650 mg, 650 mg, Oral, Q6H, Emily Amos MD, 650 mg at 09/18/20 2257    diazepam (VALIUM) tablet 5 mg, 5 mg, Oral, Q6H PRN, Franci Marrero MD    finasteride (PROSCAR) tablet 5 mg, 5 mg, Oral, Daily, Franci Marrero MD, Stopped at 09/17/20 1416    gabapentin (NEURONTIN) oral solution 300 mg, 300 mg, Oral, TID, Franci Marrero MD, 300 mg at 09/19/20 1154    lidocaine (LIDODERM) 5 % patch 1 patch, 1 patch, Topical, Daily, Franci Marrero MD, 1 patch at 09/19/20 0930    lidocaine (LIDODERM) 5 % patch 1 patch, 1 patch, Topical, Daily, Franci Marrero MD, 1 patch at 09/19/20 0930    melatonin tablet 3 mg, 3 mg, Oral, HS, Franci Marrero MD    methocarbamol (ROBAXIN) tablet 500 mg, 500 mg, Oral, Q6H PRN, Franci Marrero MD    morphine injection 1 mg, 1 mg, Intravenous, Q1H PRN, Franci Marrero MD    morphine injection 2 mg, 2 mg, Intravenous, Q4H PRN, Franci Marrero MD, 2 mg at 09/19/20 1309    nicotine (NICODERM CQ) 21 mg/24 hr TD 24 hr patch 1 patch, 1 patch, Transdermal, Daily, Franci Marrero MD, Stopped at 09/17/20 1414    ondansetron (ZOFRAN) injection 4 mg, 4 mg, Intravenous, Q6H PRN, Franci Marrero MD, 4 mg at 09/19/20 1202    pantoprazole (PROTONIX) EC tablet 40 mg, 40 mg, Oral, BID AC, Franci Marrero MD    sucralfate (CARAFATE) tablet 1 g, 1 g, Oral, 4x Daily (AC & HS), Franci Marrero MD, 1 g at 09/19/20 1135    Blood Culture:   No results found for: BLOODCX    Wound Culture:   No results found for: WOUNDCULT    Ins and Outs:  I/O last 24 hours: In: 778 7 [I V :778 7]  Out: 1450 [Urine:1450]          Physical Exam:   /87   Pulse 74   Temp 97 9 °F (36 6 °C)   Resp 16   Ht 5' 9" (1 753 m)   Wt 64 kg (141 lb 1 5 oz)   SpO2 100%   BMI 20 84 kg/m²   Gen: Alert and oriented to person, place, time  HEENT: EOMI, eyes clear, moist mucus membranes, hearing intact  Respiratory: Bilateral chest rise   No audible wheezing found  Cardiovascular: Regular Rate and Rhythm  Abdomen: soft nontender/nondistended  Musculoskeletal: BACK  · Skin intact, no open lesions  · Tenderness to palpation over Thoracic spine over the paraspinal muscles  · 5/5 strength with hip flexion/extension/abduction, knee flexion/extension, ankle dorsi/plantar flexion, EHL/FHL bilateral lower extremities  · Sensation intact L2-S1 bilateral lower extremities  · negative straight leg raise  · 2+ deep tendon reflexes noted at patella tendon, achilles tendon bilateral lower extremities      Radiology:   I personally reviewed the films  X-rays  Cervical, Thoracic and Lumbar spine shows no acute fractures or dislocations seen  Severe degenerative arthritis is noted over the facets and at L5-S1     _*_*_*_*_*_*_*_*_*_*_*_*_*_*_*_*_*_*_*_*_*_*_*_*_*_*_*_*_*_*_*_*_*_*_*_*_*_*_*_*_*    Assessment:  65 y o male complaining of  Thoracic and Lumbar back pain with degenerative changes noted on x-ray    Plan:   · Weight-bearing as tolerated bilateral lower extremity  · PT/OT  · Analgesics as per primary team  · DVT prophylaxis as per primary team  · Dispo: [id number] [address] for discharge from ortho perspective   · It was discussed with [facility] that they can try steroids as well as muscle relaxers to see if this helps with his pain and discomfort  If there is any significant concern, they can order an MRI of the lumbar spine  No further orthopedic intervention is needed at this time  If there are any questions, please reach out  Kellen Estes PA-C

## 2020-09-19 NOTE — ASSESSMENT & PLAN NOTE
Patient reports acute onset severe back pain over the last 2-3 weeks for which she has been taking excessive NSAIDs  Reviewed x-ray of thoracic and lumbar spine and they unremarkable, except for degenerative disease of the lumbar spine  Patient reports nausea with oxycodone, but continues to take morphine quite frequently and continue with scheduled Tylenol for pain  Also, discussed with orthopedics and GI and they have agreed to provide the patient with a loading dose of IV steroids  In addition, started on gabapentin, a muscle relaxant, diazepam and Lidoderm patch  PT OT ordered  Discussing rehab

## 2020-09-20 ENCOUNTER — APPOINTMENT (INPATIENT)
Dept: RADIOLOGY | Facility: HOSPITAL | Age: 65
DRG: 241 | End: 2020-09-20
Payer: COMMERCIAL

## 2020-09-20 PROBLEM — R10.9 ABDOMINAL PAIN: Status: ACTIVE | Noted: 2020-09-20

## 2020-09-20 LAB
ATRIAL RATE: 74 BPM
P AXIS: 62 DEGREES
PR INTERVAL: 150 MS
PSA FREE MFR SERPL: 14.3 %
PSA FREE SERPL-MCNC: 0.1 NG/ML
PSA SERPL-MCNC: 0.7 NG/ML (ref 0–4)
QRS AXIS: 70 DEGREES
QRSD INTERVAL: 116 MS
QT INTERVAL: 396 MS
QTC INTERVAL: 439 MS
T WAVE AXIS: 57 DEGREES
TROPONIN I SERPL-MCNC: <0.02 NG/ML
VENTRICULAR RATE: 74 BPM

## 2020-09-20 PROCEDURE — 93005 ELECTROCARDIOGRAM TRACING: CPT

## 2020-09-20 PROCEDURE — 74018 RADEX ABDOMEN 1 VIEW: CPT

## 2020-09-20 PROCEDURE — 93010 ELECTROCARDIOGRAM REPORT: CPT | Performed by: INTERNAL MEDICINE

## 2020-09-20 PROCEDURE — 84484 ASSAY OF TROPONIN QUANT: CPT | Performed by: PHYSICIAN ASSISTANT

## 2020-09-20 PROCEDURE — 99232 SBSQ HOSP IP/OBS MODERATE 35: CPT | Performed by: FAMILY MEDICINE

## 2020-09-20 RX ORDER — MAGNESIUM HYDROXIDE/ALUMINUM HYDROXICE/SIMETHICONE 120; 1200; 1200 MG/30ML; MG/30ML; MG/30ML
30 SUSPENSION ORAL EVERY 4 HOURS PRN
Status: DISCONTINUED | OUTPATIENT
Start: 2020-09-20 | End: 2020-09-21 | Stop reason: HOSPADM

## 2020-09-20 RX ORDER — LIDOCAINE HYDROCHLORIDE 20 MG/ML
15 SOLUTION OROPHARYNGEAL 4 TIMES DAILY PRN
Status: DISCONTINUED | OUTPATIENT
Start: 2020-09-20 | End: 2020-09-21 | Stop reason: HOSPADM

## 2020-09-20 RX ORDER — METHYLPREDNISOLONE SODIUM SUCCINATE 40 MG/ML
40 INJECTION, POWDER, LYOPHILIZED, FOR SOLUTION INTRAMUSCULAR; INTRAVENOUS EVERY 12 HOURS SCHEDULED
Status: DISCONTINUED | OUTPATIENT
Start: 2020-09-20 | End: 2020-09-21 | Stop reason: HOSPADM

## 2020-09-20 RX ORDER — MAGNESIUM HYDROXIDE/ALUMINUM HYDROXICE/SIMETHICONE 120; 1200; 1200 MG/30ML; MG/30ML; MG/30ML
30 SUSPENSION ORAL ONCE
Status: COMPLETED | OUTPATIENT
Start: 2020-09-20 | End: 2020-09-20

## 2020-09-20 RX ORDER — BISACODYL 10 MG
10 SUPPOSITORY, RECTAL RECTAL DAILY
Status: DISCONTINUED | OUTPATIENT
Start: 2020-09-20 | End: 2020-09-21 | Stop reason: HOSPADM

## 2020-09-20 RX ORDER — SIMETHICONE 20 MG/.3ML
40 EMULSION ORAL EVERY 6 HOURS PRN
Status: DISCONTINUED | OUTPATIENT
Start: 2020-09-20 | End: 2020-09-21 | Stop reason: HOSPADM

## 2020-09-20 RX ORDER — DOCUSATE SODIUM 100 MG/1
100 CAPSULE, LIQUID FILLED ORAL 2 TIMES DAILY
Status: DISCONTINUED | OUTPATIENT
Start: 2020-09-20 | End: 2020-09-21 | Stop reason: HOSPADM

## 2020-09-20 RX ADMIN — MELATONIN 3 MG: 3 TAB ORAL at 22:33

## 2020-09-20 RX ADMIN — DIAZEPAM 5 MG: 5 TABLET ORAL at 22:40

## 2020-09-20 RX ADMIN — FAMOTIDINE 20 MG: 10 INJECTION INTRAVENOUS at 02:35

## 2020-09-20 RX ADMIN — ACETAMINOPHEN 650 MG: 650 SUSPENSION ORAL at 17:06

## 2020-09-20 RX ADMIN — BISACODYL 10 MG: 10 SUPPOSITORY RECTAL at 13:26

## 2020-09-20 RX ADMIN — DOCUSATE SODIUM 100 MG: 100 CAPSULE, LIQUID FILLED ORAL at 10:42

## 2020-09-20 RX ADMIN — LIDOCAINE 5% 1 PATCH: 700 PATCH TOPICAL at 07:56

## 2020-09-20 RX ADMIN — METHOCARBAMOL TABLETS 500 MG: 500 TABLET, COATED ORAL at 07:57

## 2020-09-20 RX ADMIN — GABAPENTIN 300 MG: 250 SOLUTION ORAL at 15:50

## 2020-09-20 RX ADMIN — PANTOPRAZOLE SODIUM 40 MG: 40 TABLET, DELAYED RELEASE ORAL at 06:21

## 2020-09-20 RX ADMIN — ACETAMINOPHEN 650 MG: 650 SUSPENSION ORAL at 22:40

## 2020-09-20 RX ADMIN — ALUMINUM HYDROXIDE, MAGNESIUM HYDROXIDE, AND SIMETHICONE 30 ML: 200; 200; 20 SUSPENSION ORAL at 22:40

## 2020-09-20 RX ADMIN — LIDOCAINE HYDROCHLORIDE 15 ML: 20 SOLUTION ORAL; TOPICAL at 22:48

## 2020-09-20 RX ADMIN — FAMOTIDINE 20 MG: 10 INJECTION, SOLUTION INTRAVENOUS at 22:41

## 2020-09-20 RX ADMIN — GABAPENTIN 300 MG: 250 SOLUTION ORAL at 22:32

## 2020-09-20 RX ADMIN — SUCRALFATE 1 G: 1 TABLET ORAL at 06:20

## 2020-09-20 RX ADMIN — ALUMINUM HYDROXIDE, MAGNESIUM HYDROXIDE, AND SIMETHICONE 30 ML: 200; 200; 20 SUSPENSION ORAL at 21:30

## 2020-09-20 RX ADMIN — ACETAMINOPHEN 650 MG: 650 SUSPENSION ORAL at 03:46

## 2020-09-20 RX ADMIN — ALUMINUM HYDROXIDE, MAGNESIUM HYDROXIDE, AND SIMETHICONE 30 ML: 200; 200; 20 SUSPENSION ORAL at 02:41

## 2020-09-20 RX ADMIN — SUCRALFATE 1 G: 1 TABLET ORAL at 22:32

## 2020-09-20 RX ADMIN — ALUMINUM HYDROXIDE, MAGNESIUM HYDROXIDE, AND SIMETHICONE 30 ML: 200; 200; 20 SUSPENSION ORAL at 10:46

## 2020-09-20 RX ADMIN — MORPHINE SULFATE 2 MG: 2 INJECTION, SOLUTION INTRAMUSCULAR; INTRAVENOUS at 07:57

## 2020-09-20 RX ADMIN — FINASTERIDE 5 MG: 5 TABLET, FILM COATED ORAL at 07:54

## 2020-09-20 RX ADMIN — LIDOCAINE 5% 1 PATCH: 700 PATCH TOPICAL at 07:54

## 2020-09-20 RX ADMIN — METHYLPREDNISOLONE SODIUM SUCCINATE 40 MG: 40 INJECTION, POWDER, FOR SOLUTION INTRAMUSCULAR; INTRAVENOUS at 10:35

## 2020-09-20 RX ADMIN — METHYLPREDNISOLONE SODIUM SUCCINATE 40 MG: 40 INJECTION, POWDER, FOR SOLUTION INTRAMUSCULAR; INTRAVENOUS at 22:33

## 2020-09-20 RX ADMIN — SUCRALFATE 1 G: 1 TABLET ORAL at 15:29

## 2020-09-20 RX ADMIN — ACETAMINOPHEN 650 MG: 650 SUSPENSION ORAL at 10:41

## 2020-09-20 RX ADMIN — GABAPENTIN 300 MG: 250 SOLUTION ORAL at 09:13

## 2020-09-20 RX ADMIN — SUCRALFATE 1 G: 1 TABLET ORAL at 10:41

## 2020-09-20 RX ADMIN — Medication 40 MG: at 13:26

## 2020-09-20 RX ADMIN — ENOXAPARIN SODIUM 40 MG: 40 INJECTION SUBCUTANEOUS at 15:34

## 2020-09-20 RX ADMIN — MORPHINE SULFATE 2 MG: 2 INJECTION, SOLUTION INTRAMUSCULAR; INTRAVENOUS at 21:54

## 2020-09-20 RX ADMIN — MORPHINE SULFATE 2 MG: 2 INJECTION, SOLUTION INTRAMUSCULAR; INTRAVENOUS at 03:37

## 2020-09-20 RX ADMIN — Medication 40 MG: at 19:10

## 2020-09-20 RX ADMIN — PANTOPRAZOLE SODIUM 40 MG: 40 TABLET, DELAYED RELEASE ORAL at 15:29

## 2020-09-20 RX ADMIN — MORPHINE SULFATE 1 MG: 2 INJECTION, SOLUTION INTRAMUSCULAR; INTRAVENOUS at 02:44

## 2020-09-20 RX ADMIN — NICOTINE 1 PATCH: 21 PATCH TRANSDERMAL at 07:55

## 2020-09-20 RX ADMIN — DOCUSATE SODIUM 100 MG: 100 CAPSULE, LIQUID FILLED ORAL at 17:07

## 2020-09-20 RX ADMIN — ALUMINUM HYDROXIDE, MAGNESIUM HYDROXIDE, AND SIMETHICONE 30 ML: 200; 200; 20 SUSPENSION ORAL at 17:11

## 2020-09-20 RX ADMIN — DIAZEPAM 5 MG: 5 TABLET ORAL at 07:57

## 2020-09-20 NOTE — PROGRESS NOTES
Progress Note - Miroslava Chi 1955, 60 y o  male MRN: [id number]    Unit/Bed#: -01 Encounter: [id number]    Primary Care Provider: Hank Anderson MD   Date and time admitted to hospital: 9/16/2020 10:08 PM        Abdominal pain  Assessment & Plan  Patient currently reports periumbilical pain  It is quite severe this morning  Suspect that patient has gas pains versus constipation due to recent use of opioids  Will provide with Dulcolax suppository and also provided with stool softeners  Abdominal x-ray ordered  History of solitary pulmonary nodule  Assessment & Plan  Patient is at high risk for lung cancer  Has a distant history of pulmonary nodule  Continues to smoke tobacco   CT chest revealed a 2 mm stable pulmonary nodule  Severe protein-calorie malnutrition ([address] )  Assessment & Plan  Malnutrition Findings:         Patient reports no p o  Intake at all for the past 3 days due to severe pain and, specifically, abdominal pain  Patient was found to have gastritis based on the EGD report  His BMI is already 20 8 which is consider underweight for his age  There is, however, no acute weight loss reported by the patient  I would recommend that patient start nutritional supplements and nutrition consult  BMI Findings: Body mass index is 20 84 kg/m²  Acute back pain  Assessment & Plan  Patient reports acute onset severe back pain over the last 2-3 weeks for which she has been taking excessive NSAIDs  Reviewed x-ray of thoracic and lumbar spine and they unremarkable, except for degenerative disease of the lumbar spine  Patient reports nausea with oxycodone, but continues to take morphine quite frequently and continue with scheduled Tylenol for pain  Also, discussed with orthopedics and GI and they have agreed to provide the patient with a loading dose of IV steroids  In addition, started on gabapentin, a muscle relaxant, diazepam and Lidoderm patch    PT OT ordered  Discussing rehab  Nicotine dependence  Assessment & Plan  Will give nicotine patch  Counseled on smoking cessation  Patient is at risk for lung cancer  He has not had routine maintenance CT of the chest for screening purposes  CT of the chest reveals stable pulmonary nodule    BPH associated with nocturia  Assessment & Plan  Continue finasteride  Enlarged prostate noted on the CT of the abdomen  Normal PSA screen  * Epigastric pain  Assessment & Plan  4year-old man presenting with epigastric pain x2 days  Reportedly has had worsening acute back pain and has been taking 3 tablets of 600 mg ibuprofen every 4 hours as well as cortical steroid course  CT of the abdomen pelvis on admission did not demonstrate any evidence of acute abnormality  Labs including CMP lipase unremarkable  Troponin so far negative  EKG nonischemic    Likely peptic ulcer disease/gastritis in the setting of excess NSAID use  Currently on Protonix twice a day  GI recommendations greatly appreciated  Status post EGD on 09/18  Revealed NSAID induced gastric irritation  Continue Protonix and Carafate      VTE Pharmacologic Prophylaxis:   Pharmacologic: Enoxaparin (Lovenox)  Mechanical VTE Prophylaxis in Place: Yes    Patient Centered Rounds: I have performed bedside rounds with nursing staff today  Discussions with Specialists or Other Care Team Provider: ortho and GI    Education and Discussions with Family / Patient: daughter    Time Spent for Care: 30 minutes  More than 50% of total time spent on counseling and coordination of care as described above  Current Length of Stay: 2 day(s)    Current Patient Status: Inpatient   Certification Statement: The patient will continue to require additional inpatient hospital stay due to intractable pain    Discharge Plan: 48 hrs    Code Status: Level 1 - Full Code      Subjective:   Patient was seen and examined  He is complaining of abdominal pain today    He reports that his back pain is now better after "we gave him something yesterday"    Objective:     Vitals:   Temp (24hrs), Av 8 °F (37 1 °C), Min:98 3 °F (36 8 °C), Max:99 2 °F (37 3 °C)    Temp:  [98 3 °F (36 8 °C)-99 2 °F (37 3 °C)] 99 2 °F (37 3 °C)  HR:  [] 79  Resp:  [16-18] 18  BP: (110-151)/() 151/117  SpO2:  [94 %-98 %] 96 %  Body mass index is 20 84 kg/m²  Input and Output Summary (last 24 hours): Intake/Output Summary (Last 24 hours) at 2020 1334  Last data filed at 2020 0851  Gross per 24 hour   Intake 320 ml   Output    Net 320 ml       Physical Exam:     Physical Exam  Constitutional:       Appearance: He is well-developed  Cardiovascular:      Rate and Rhythm: Normal rate and regular rhythm  Heart sounds: Normal heart sounds  Pulmonary:      Effort: Pulmonary effort is normal  No respiratory distress  Breath sounds: Normal breath sounds  Abdominal:      Palpations: Abdomen is soft  Tenderness: There is abdominal tenderness (generalized)  Musculoskeletal:         General: No deformity  Skin:     General: Skin is warm  Findings: No erythema or rash  Neurological:      Gait: Gait normal          Additional Data:     Labs:    Results from last 7 days   Lab Units 20  2317   WBC Thousand/uL 8 90   HEMOGLOBIN g/dL 14 5   HEMATOCRIT % 45 1   PLATELETS Thousands/uL 290   NEUTROS PCT % 57   LYMPHS PCT % 33   MONOS PCT % 8   EOS PCT % 1     Results from last 7 days   Lab Units 20  2317   SODIUM mmol/L 138   POTASSIUM mmol/L 4 2   CHLORIDE mmol/L 102   CO2 mmol/L 30   BUN mg/dL 17   CREATININE mg/dL 0 88   ANION GAP mmol/L 6   CALCIUM mg/dL 9 7   ALBUMIN g/dL 3 9   TOTAL BILIRUBIN mg/dL 0 40   ALK PHOS U/L 62   ALT U/L 39   AST U/L 18   GLUCOSE RANDOM mg/dL 103         Results from last 7 days   Lab Units 20  2110   POC GLUCOSE mg/dl 112                   * I Have Reviewed All Lab Data Listed Above    * Additional Pertinent Lab Tests Reviewed: All Labs Within Last 24 Hours Reviewed    Imaging:      Recent Cultures (last 7 days):           Last 24 Hours Medication List:   Current Facility-Administered Medications   Medication Dose Route Frequency Provider Last Rate    acetaminophen  650 mg Oral Q6H Sridevi Cordero MD      aluminum-magnesium hydroxide-simethicone  30 mL Oral Q4H PRN Obey Bailon PA-C      bisacodyl  10 mg Rectal Daily rSidevi Cordero MD      diazepam  5 mg Oral Q6H PRN Sridevi Cordero MD      docusate sodium  100 mg Oral BID Sridevi Cordero MD      finasteride  5 mg Oral Daily Sridevi Cordero MD      gabapentin  300 mg Oral TID Sridevi Cordero MD      lidocaine  1 patch Topical Daily Sridevi Cordero MD      lidocaine  1 patch Topical Daily Sridevi Cordero MD      melatonin  3 mg Oral HS Sridevi Cordero MD      methocarbamol  500 mg Oral Q6H PRN Sridevi Cordero MD      methylPREDNISolone sodium succinate  40 mg Intravenous Q12H Duran Mustafa MD      morphine injection  1 mg Intravenous Q1H PRN Sridevi Cordero MD      morphine injection  2 mg Intravenous Q4H PRN Sridevi Cordero MD      nicotine  1 patch Transdermal Daily Sridevi Cordero MD      ondansetron  4 mg Intravenous Q6H PRN Sridevi Cordero MD      pantoprazole  40 mg Oral BID AC Sridevi Cordero MD      simethicone  40 mg Oral Q6H PRN Sridevi Cordero MD      sucralfate  1 g Oral 4x Daily (AC & HS) Sridevi Cordero MD          Today, Patient Was Seen By: Sridevi Cordero MD    ** Please Note: Dictation voice to text software may have been used in the creation of this document   **

## 2020-09-20 NOTE — ASSESSMENT & PLAN NOTE
Patient currently reports periumbilical pain  It is quite severe this morning  Suspect that patient has gas pains versus constipation due to recent use of opioids  Will provide with Dulcolax suppository and also provided with stool softeners  Abdominal x-ray ordered

## 2020-09-20 NOTE — ASSESSMENT & PLAN NOTE
Will give nicotine patch  Counseled on smoking cessation  Patient is at risk for lung cancer  He has not had routine maintenance CT of the chest for screening purposes    CT of the chest reveals stable pulmonary nodule

## 2020-09-21 ENCOUNTER — TELEPHONE (OUTPATIENT)
Dept: GASTROENTEROLOGY | Facility: CLINIC | Age: 65
End: 2020-09-21

## 2020-09-21 ENCOUNTER — TRANSITIONAL CARE MANAGEMENT (OUTPATIENT)
Dept: INTERNAL MEDICINE CLINIC | Facility: CLINIC | Age: 65
End: 2020-09-21

## 2020-09-21 VITALS
RESPIRATION RATE: 18 BRPM | OXYGEN SATURATION: 99 % | DIASTOLIC BLOOD PRESSURE: 79 MMHG | BODY MASS INDEX: 20.9 KG/M2 | SYSTOLIC BLOOD PRESSURE: 123 MMHG | HEART RATE: 87 BPM | HEIGHT: 69 IN | WEIGHT: 141.09 LBS | TEMPERATURE: 98.5 F

## 2020-09-21 LAB
ANION GAP SERPL CALCULATED.3IONS-SCNC: 9 MMOL/L (ref 4–13)
BASOPHILS # BLD AUTO: 0.03 THOUSANDS/ΜL (ref 0–0.1)
BASOPHILS NFR BLD AUTO: 0 % (ref 0–1)
BUN SERPL-MCNC: 21 MG/DL (ref 5–25)
CALCIUM SERPL-MCNC: 9.3 MG/DL (ref 8.3–10.1)
CHLORIDE SERPL-SCNC: 102 MMOL/L (ref 100–108)
CO2 SERPL-SCNC: 29 MMOL/L (ref 21–32)
CREAT SERPL-MCNC: 0.76 MG/DL (ref 0.6–1.3)
EOSINOPHIL # BLD AUTO: 0.16 THOUSAND/ΜL (ref 0–0.61)
EOSINOPHIL NFR BLD AUTO: 2 % (ref 0–6)
ERYTHROCYTE [DISTWIDTH] IN BLOOD BY AUTOMATED COUNT: 13 % (ref 11.6–15.1)
GFR SERPL CREATININE-BSD FRML MDRD: 96 ML/MIN/1.73SQ M
GLUCOSE SERPL-MCNC: 124 MG/DL (ref 65–140)
HCT VFR BLD AUTO: 40.9 % (ref 36.5–49.3)
HGB BLD-MCNC: 13.5 G/DL (ref 12–17)
IMM GRANULOCYTES # BLD AUTO: 0.03 THOUSAND/UL (ref 0–0.2)
IMM GRANULOCYTES NFR BLD AUTO: 0 % (ref 0–2)
LYMPHOCYTES # BLD AUTO: 2.18 THOUSANDS/ΜL (ref 0.6–4.47)
LYMPHOCYTES NFR BLD AUTO: 31 % (ref 14–44)
MCH RBC QN AUTO: 29.4 PG (ref 26.8–34.3)
MCHC RBC AUTO-ENTMCNC: 33 G/DL (ref 31.4–37.4)
MCV RBC AUTO: 89 FL (ref 82–98)
MONOCYTES # BLD AUTO: 0.3 THOUSAND/ΜL (ref 0.17–1.22)
MONOCYTES NFR BLD AUTO: 4 % (ref 4–12)
NEUTROPHILS # BLD AUTO: 4.33 THOUSANDS/ΜL (ref 1.85–7.62)
NEUTS SEG NFR BLD AUTO: 63 % (ref 43–75)
NRBC BLD AUTO-RTO: 0 /100 WBCS
PLATELET # BLD AUTO: 271 THOUSANDS/UL (ref 149–390)
PMV BLD AUTO: 9.6 FL (ref 8.9–12.7)
POTASSIUM SERPL-SCNC: 4 MMOL/L (ref 3.5–5.3)
RBC # BLD AUTO: 4.59 MILLION/UL (ref 3.88–5.62)
RHEUMATOID FACT SER QL LA: NEGATIVE
SODIUM SERPL-SCNC: 140 MMOL/L (ref 136–145)
WBC # BLD AUTO: 7.03 THOUSAND/UL (ref 4.31–10.16)

## 2020-09-21 PROCEDURE — 80048 BASIC METABOLIC PNL TOTAL CA: CPT | Performed by: FAMILY MEDICINE

## 2020-09-21 PROCEDURE — 99239 HOSP IP/OBS DSCHRG MGMT >30: CPT | Performed by: FAMILY MEDICINE

## 2020-09-21 PROCEDURE — 85025 COMPLETE CBC W/AUTO DIFF WBC: CPT | Performed by: FAMILY MEDICINE

## 2020-09-21 RX ORDER — PANTOPRAZOLE SODIUM 40 MG/1
40 TABLET, DELAYED RELEASE ORAL
Qty: 60 TABLET | Refills: 0 | Status: ON HOLD | OUTPATIENT
Start: 2020-09-21 | End: 2020-10-06 | Stop reason: SDUPTHER

## 2020-09-21 RX ORDER — SIMETHICONE 20 MG/.3ML
40 EMULSION ORAL EVERY 6 HOURS PRN
Qty: 30 ML | Refills: 0 | Status: SHIPPED | OUTPATIENT
Start: 2020-09-21 | End: 2021-09-13 | Stop reason: ALTCHOICE

## 2020-09-21 RX ORDER — OXYCODONE HYDROCHLORIDE 5 MG/1
5 TABLET ORAL EVERY 4 HOURS PRN
Status: DISCONTINUED | OUTPATIENT
Start: 2020-09-21 | End: 2020-09-21 | Stop reason: HOSPADM

## 2020-09-21 RX ORDER — SUCRALFATE 1 G/1
1 TABLET ORAL
Qty: 120 TABLET | Refills: 0 | Status: SHIPPED | OUTPATIENT
Start: 2020-09-21 | End: 2020-10-06 | Stop reason: HOSPADM

## 2020-09-21 RX ORDER — METHOCARBAMOL 500 MG/1
500 TABLET, FILM COATED ORAL EVERY 6 HOURS PRN
Qty: 120 TABLET | Refills: 0 | Status: SHIPPED | OUTPATIENT
Start: 2020-09-21 | End: 2020-10-06 | Stop reason: HOSPADM

## 2020-09-21 RX ORDER — OLANZAPINE 10 MG/1
5 INJECTION, POWDER, LYOPHILIZED, FOR SOLUTION INTRAMUSCULAR ONCE
Status: COMPLETED | OUTPATIENT
Start: 2020-09-21 | End: 2020-09-21

## 2020-09-21 RX ORDER — OXYCODONE HYDROCHLORIDE 5 MG/1
5 TABLET ORAL EVERY 6 HOURS PRN
Qty: 7 TABLET | Refills: 0 | Status: SHIPPED | OUTPATIENT
Start: 2020-09-21 | End: 2020-10-02 | Stop reason: ALTCHOICE

## 2020-09-21 RX ORDER — GABAPENTIN 250 MG/5ML
300 SOLUTION ORAL 3 TIMES DAILY
Qty: 470 ML | Refills: 0 | Status: SHIPPED | OUTPATIENT
Start: 2020-09-21 | End: 2020-10-06 | Stop reason: HOSPADM

## 2020-09-21 RX ORDER — LIDOCAINE 50 MG/G
1 PATCH TOPICAL DAILY
Qty: 30 PATCH | Refills: 0 | Status: SHIPPED | OUTPATIENT
Start: 2020-09-22 | End: 2020-10-02 | Stop reason: ALTCHOICE

## 2020-09-21 RX ADMIN — MORPHINE SULFATE 2 MG: 2 INJECTION, SOLUTION INTRAMUSCULAR; INTRAVENOUS at 13:58

## 2020-09-21 RX ADMIN — LIDOCAINE HYDROCHLORIDE 15 ML: 20 SOLUTION ORAL; TOPICAL at 10:02

## 2020-09-21 RX ADMIN — FINASTERIDE 5 MG: 5 TABLET, FILM COATED ORAL at 10:05

## 2020-09-21 RX ADMIN — MORPHINE SULFATE 1 MG: 2 INJECTION, SOLUTION INTRAMUSCULAR; INTRAVENOUS at 00:35

## 2020-09-21 RX ADMIN — ENOXAPARIN SODIUM 40 MG: 40 INJECTION SUBCUTANEOUS at 10:07

## 2020-09-21 RX ADMIN — DOCUSATE SODIUM 100 MG: 100 CAPSULE, LIQUID FILLED ORAL at 10:06

## 2020-09-21 RX ADMIN — Medication 40 MG: at 00:35

## 2020-09-21 RX ADMIN — GABAPENTIN 300 MG: 250 SOLUTION ORAL at 10:07

## 2020-09-21 RX ADMIN — METHYLPREDNISOLONE SODIUM SUCCINATE 40 MG: 40 INJECTION, POWDER, FOR SOLUTION INTRAMUSCULAR; INTRAVENOUS at 10:08

## 2020-09-21 RX ADMIN — PANTOPRAZOLE SODIUM 40 MG: 40 TABLET, DELAYED RELEASE ORAL at 06:22

## 2020-09-21 RX ADMIN — OLANZAPINE 5 MG: 10 INJECTION, POWDER, FOR SOLUTION INTRAMUSCULAR at 02:18

## 2020-09-21 RX ADMIN — ACETAMINOPHEN 650 MG: 650 SUSPENSION ORAL at 10:03

## 2020-09-21 RX ADMIN — MORPHINE SULFATE 2 MG: 2 INJECTION, SOLUTION INTRAMUSCULAR; INTRAVENOUS at 07:49

## 2020-09-21 RX ADMIN — SUCRALFATE 1 G: 1 TABLET ORAL at 06:23

## 2020-09-21 RX ADMIN — ALUMINUM HYDROXIDE, MAGNESIUM HYDROXIDE, AND SIMETHICONE 30 ML: 200; 200; 20 SUSPENSION ORAL at 10:03

## 2020-09-21 RX ADMIN — Medication 40 MG: at 10:07

## 2020-09-21 RX ADMIN — ACETAMINOPHEN 650 MG: 650 SUSPENSION ORAL at 05:19

## 2020-09-21 NOTE — TELEPHONE ENCOUNTER
Spoke to patient  Angi Quinteros He is still hospitalized  Angi Quinteros He will phone back for an appt when he gets discharged  Angi Quinteros

## 2020-09-21 NOTE — TELEPHONE ENCOUNTER
----- Message from Leslie Foy PA-C sent at 9/19/2020  8:28 AM EDT -----  Patient should have a follow up from the hospital in 2-3 weeks

## 2020-09-21 NOTE — TELEPHONE ENCOUNTER
Dr Abhay Vizcarra - patient called [facility] he is returning our call   Please call Stephanie Dykes at [phone] ty

## 2020-09-21 NOTE — PLAN OF CARE
Problem: Potential for Falls  Goal: Patient will remain free of falls  Description: INTERVENTIONS:  - Assess patient frequently for physical needs  -  Identify cognitive and physical deficits and behaviors that affect risk of falls    -  [place] fall precautions as indicated by assessment   - Educate patient/family on patient safety including physical limitations  - Instruct patient to call for assistance with activity based on assessment  - Modify environment to reduce risk of injury  - Consider OT/PT consult to assist with strengthening/mobility  Outcome: Progressing     Problem: PAIN - ADULT  Goal: Verbalizes/displays adequate comfort level or baseline comfort level  Description: Interventions:  - Encourage patient to monitor pain and request assistance  - Assess pain using appropriate pain scale  - Administer analgesics based on type and severity of pain and evaluate response  - Implement non-pharmacological measures as appropriate and evaluate response  - Consider cultural and social influences on pain and pain management  - Notify physician/advanced practitioner if interventions unsuccessful or patient reports new pain  Outcome: Progressing     Problem: INFECTION - ADULT  Goal: Absence or prevention of progression during hospitalization  Description: INTERVENTIONS:  - Assess and monitor for signs and symptoms of infection  - Monitor lab/diagnostic results  - Monitor all insertion sites, i e  indwelling lines, tubes, and drains  - Monitor endotracheal if appropriate and nasal secretions for changes in amount and color  - [place] appropriate cooling/warming therapies per order  - Administer medications as ordered  - Instruct and encourage patient and family to use good hand hygiene technique  - Identify and instruct in appropriate isolation precautions for identified infection/condition  Outcome: Progressing  Goal: Absence of fever/infection during neutropenic period  Description: INTERVENTIONS:  - Monitor WBC    Outcome: Progressing     Problem: SAFETY ADULT  Goal: Patient will remain free of falls  Description: INTERVENTIONS:  - Assess patient frequently for physical needs  -  Identify cognitive and physical deficits and behaviors that affect risk of falls    -  [place] fall precautions as indicated by assessment   - Educate patient/family on patient safety including physical limitations  - Instruct patient to call for assistance with activity based on assessment  - Modify environment to reduce risk of injury  - Consider OT/PT consult to assist with strengthening/mobility  Outcome: Progressing  Goal: Maintain or return to baseline ADL function  Description: INTERVENTIONS:  -  Assess patient's ability to carry out ADLs; assess patient's baseline for ADL function and identify physical deficits which impact ability to perform ADLs (bathing, care of mouth/teeth, toileting, grooming, dressing, etc )  - Assess/evaluate cause of self-care deficits   - Assess range of motion  - Assess patient's mobility; develop plan if impaired  - Assess patient's need for assistive devices and provide as appropriate  - Encourage maximum independence but intervene and supervise when necessary  - Involve family in performance of ADLs  - Assess for home care needs following discharge   - Consider OT consult to assist with ADL evaluation and planning for discharge  - Provide patient education as appropriate  Outcome: Progressing  Goal: Maintain or return mobility status to optimal level  Description: INTERVENTIONS:  - Assess patient's baseline mobility status (ambulation, transfers, stairs, etc )    - Identify cognitive and physical deficits and behaviors that affect mobility  - Identify mobility aids required to assist with transfers and/or ambulation (gait belt, sit-to-stand, lift, walker, cane, etc )  - [place] fall precautions as indicated by assessment  - Record patient progress and toleration of activity level on Mobility SBAR; progress patient to next Phase/Stage  - Instruct patient to call for assistance with activity based on assessment  - Consider rehabilitation consult to assist with strengthening/weightbearing, etc   Outcome: Progressing     Problem: DISCHARGE PLANNING  Goal: Discharge to home or other facility with appropriate resources  Description: INTERVENTIONS:  - Identify barriers to discharge w/patient and caregiver  - Arrange for needed discharge resources and transportation as appropriate  - Identify discharge learning needs (meds, wound care, etc )  - Arrange for interpretive services to assist at discharge as needed  - Refer to Case Management Department for coordinating discharge planning if the patient needs post-hospital services based on physician/advanced practitioner order or complex needs related to functional status, cognitive ability, or social support system  Outcome: Progressing     Problem: Knowledge Deficit  Goal: Patient/family/caregiver demonstrates understanding of disease process, treatment plan, medications, and discharge instructions  Description: Complete learning assessment and assess knowledge base    Interventions:  - Provide teaching at level of understanding  - Provide teaching via preferred learning methods  Outcome: Progressing

## 2020-09-21 NOTE — NURSING NOTE
Pt stated a few times that when pain gets that bad he wants to die  I ask him if he's serious about that statement and he said he just doesn't care anymore, he states can't stand the pain and he cant think when is that severe  I ask him if he want to kill himself or if he has a plan and he said no  He was walking around the room punching the left side of his chest because he says that helps him forget about the pain on his stomach  I made SLIM aware and a psych consult as placed, pt is comfortably sleeping at this time, RN sitting next to the room to observe closely for signs of distress

## 2020-09-21 NOTE — ASSESSMENT & PLAN NOTE
Patient reports acute onset severe back pain over the last 2-3 weeks for which she has been taking excessive NSAIDs  Reviewed x-ray of thoracic and lumbar spine and they unremarkable, except for degenerative disease of the lumbar spine  Patient reports nausea with oxycodone, but continues to take morphine quite frequently and continue with scheduled Tylenol for pain  Also, discussed with orthopedics and GI and they have agreed to provide the patient with a loading dose of IV steroids  In addition, started on gabapentin, a muscle relaxant, diazepam and Lidoderm patch  PT OT ordered    Not a candidate for rehab

## 2020-09-21 NOTE — DISCHARGE SUMMARY
Discharge- [facility] 1955, 60 y o  male MRN: [id number]    Unit/Bed#: -01 Encounter: [id number]    Primary Care Provider: Pennie Vargas MD   Date and time admitted to hospital: 9/16/2020 10:08 PM        Abdominal pain  Assessment & Plan  Patient currently reports periumbilical pain  It was quite severe last night  Suspect that patient has gas pains versus constipation due to recent use of opioids  Will provide with Dulcolax suppository and also provided with stool softeners  Abdominal x-ray was normal    History of solitary pulmonary nodule  Assessment & Plan  Patient is at high risk for lung cancer  Has a distant history of pulmonary nodule  Continues to smoke tobacco   CT chest revealed a 2 mm stable pulmonary nodule  Severe protein-calorie malnutrition ([address] )  Assessment & Plan  Malnutrition Findings:         Patient reports no p o  Intake at all for the past 3 days due to severe pain and, specifically, abdominal pain  Patient was found to have gastritis based on the EGD report  His BMI is already 20 8 which is consider underweight for his age  There is, however, no acute weight loss reported by the patient  I would recommend that patient start nutritional supplements and nutrition consult  BMI Findings: Body mass index is 20 84 kg/m²  Acute back pain  Assessment & Plan  Patient reports acute onset severe back pain over the last 2-3 weeks for which she has been taking excessive NSAIDs  Reviewed x-ray of thoracic and lumbar spine and they unremarkable, except for degenerative disease of the lumbar spine  Patient reports nausea with oxycodone, but continues to take morphine quite frequently and continue with scheduled Tylenol for pain  Also, discussed with orthopedics and GI and they have agreed to provide the patient with a loading dose of IV steroids  In addition, started on gabapentin, a muscle relaxant, diazepam and Lidoderm patch  PT OT ordered    Not a candidate for rehab    Nicotine dependence  Assessment & Plan  Will give nicotine patch  Counseled on smoking cessation  Patient is at risk for lung cancer  He has not had routine maintenance CT of the chest for screening purposes  CT of the chest reveals stable pulmonary nodule    BPH associated with nocturia  Assessment & Plan  Continue finasteride  Enlarged prostate noted on the CT of the abdomen  Normal PSA screen  * Epigastric pain  Assessment & Plan  4year-old man presenting with epigastric pain x2 days  Reportedly has had worsening acute back pain and has been taking 3 tablets of 600 mg ibuprofen every 4 hours as well as cortical steroid course  CT of the abdomen pelvis on admission did not demonstrate any evidence of acute abnormality  Labs including CMP lipase unremarkable  Troponin so far negative  EKG nonischemic    Likely peptic ulcer disease/gastritis in the setting of excess NSAID use  Currently on Protonix twice a day  GI recommendations greatly appreciated  Status post EGD on 09/18  Revealed NSAID induced gastric irritation  Continue Protonix and Carafate      Discharge Summary - [facility]    Patient Information: Mirian Sandifer 60 y o  male MRN: [id number]  Unit/Bed#: -01 Encounter: [id number]    Discharging Physician / Practitioner: Ezra Guzman MD  PCP: Eladio Whitmore MD  Admission Date: 9/16/2020  Discharge Date: 09/21/20    Reason for Admission:  Intractable back and abdominal pain    Discharge Diagnoses:     Principal Problem:    Epigastric pain  Active Problems:    BPH associated with nocturia    Nicotine dependence    Acute back pain    Severe protein-calorie malnutrition ([address] )    History of solitary pulmonary nodule    Abdominal pain  Resolved Problems:    * No resolved hospital problems   *      Consultations During Hospital Stay:  · Orthopedics  · GI    Procedures Performed:     · EGD    Significant Findings / Test Results:     EGD: Multiple antral erosions and ulceration secondary to nonsteroidal anti-inflammatory induced injury; biopsies obtained to rule out Helicobacter pylori     X-ray of the spine:Severe degenerative facet disease at L3-L4, L4-L5, and L5-S1 bilaterally  Severe degenerative disc space narrowing at L5-S1  Incidental Findings:   · CT of the chest:  2 mm pulmonary left upper lobe nodule    Test Results Pending at Discharge (will require follow up): · None     Outpatient Tests Requested:  · None    Complications:  None    Hospital Course:     Mirian Sandifer is a 60 y o  male patient who originally presented to the hospital on 9/16/2020 due to intractable back pain that started a couple of weeks ago and abdominal pain  Since the 1st day, patient has been having intractable pain requiring high doses of IV morphine  Unfortunately, patient was not able to take p o  Medications due to extreme pain in his abdomen  Patient was seen by the GI and they proceeded with EGD which revealed multiple erosions in his stomach which is typical for anti inflammatory medications induced injury  Orthopedics have also seen the patient and they recommend supportive treatment for back pain  No significant abnormalities were noted on the x-ray of the lumbar and thoracic spine  I have provided the patient with the several doses of IV Solu-Medrol, which provided temporary relief  Patient will continue with gabapentin, Lidoderm patches, oxycodone and Robaxin  There was an incident yesterday in the evening when patient had intractable pain and threatened to kill himself, however, he denied any plan of suicide and he stated that he only feels like that during the severe pain attack  I have discussed patient's course with his daughter who wants me to discharge the patient today  He will travel to her to [place] and stay closer to her  Condition at Discharge: stable     Discharge Day Visit / Exam:     Subjective:  Patient was seen and examined  He reports improvement the pain now, but he states that depending on what he eats, he may have severe abdominal discomfort just like he had yesterday  Vitals: Blood Pressure: 123/79 (09/21/20 0752)  Pulse: 87 (09/21/20 0752)  Temperature: 98 5 °F (36 9 °C) (09/21/20 0752)  Temp Source: Oral (09/21/20 0752)  Respirations: 18 (09/21/20 0752)  Height: 5' 9" (175 3 cm) (09/17/20 0411)  Weight - Scale: 64 kg (141 lb 1 5 oz) (09/17/20 0411)  SpO2: 99 % (09/21/20 0752)  Exam:     Physical Exam  Constitutional:       Appearance: He is underweight  HENT:      Head: Normocephalic and atraumatic  Neck:      Musculoskeletal: Normal range of motion  Cardiovascular:      Rate and Rhythm: Normal rate and regular rhythm  Heart sounds: Normal heart sounds  Pulmonary:      Effort: Pulmonary effort is normal  No respiratory distress  Breath sounds: Normal breath sounds  Abdominal:      Palpations: Abdomen is soft  Tenderness: There is no abdominal tenderness  Musculoskeletal:         General: No deformity  Skin:     General: Skin is warm  Findings: No erythema or rash  Neurological:      Mental Status: He is alert  Discussion with Family:  Patient's daughter    Discharge instructions/Information to patient and family:   See after visit summary for information provided to patient and family  Provisions for Follow-Up Care:  See after visit summary for information related to follow-up care and any pertinent home health orders  Disposition:     Home    For Discharges to [facility] SN[facility]:   · Not Applicable to this Patient - Not Applicable to this Patient    Planned Readmission: no     Discharge Statement:  I spent 45 minutes discharging the patient  This time was spent on the day of discharge  I had direct contact with the patient on the day of discharge   Greater than 50% of the total time was spent examining patient, answering all patient questions, arranging and discussing plan of care with patient as well as directly providing post-discharge instructions  Additional time then spent on discharge activities  Discharge Medications:  See after visit summary for reconciled discharge medications provided to patient and family        ** Please Note: This note has been constructed using a voice recognition system **

## 2020-09-21 NOTE — ASSESSMENT & PLAN NOTE
Patient currently reports periumbilical pain  It was quite severe last night  Suspect that patient has gas pains versus constipation due to recent use of opioids  Will provide with Dulcolax suppository and also provided with stool softeners    Abdominal x-ray was normal

## 2020-09-21 NOTE — QUICK NOTE
Full to side by bedside RN and made aware that patient stated that he wants to die and wants to kill himself when he feels this epigastric pain  Patient started hitting himself in the chest at this time  Patient is displaying some bizarre behavior and she is concerned about his safety  Psych consult ordered, one-to-one for safety ordered

## 2020-09-22 ENCOUNTER — HOSPITAL ENCOUNTER (OUTPATIENT)
Facility: HOSPITAL | Age: 65
Setting detail: OBSERVATION
Discharge: ANOTHER HOSPITAL | End: 2020-09-22
Attending: EMERGENCY MEDICINE | Admitting: INTERNAL MEDICINE
Payer: COMMERCIAL

## 2020-09-22 ENCOUNTER — APPOINTMENT (EMERGENCY)
Dept: CT IMAGING | Facility: HOSPITAL | Age: 65
End: 2020-09-22
Payer: COMMERCIAL

## 2020-09-22 VITALS
HEIGHT: 69 IN | OXYGEN SATURATION: 97 % | HEART RATE: 76 BPM | BODY MASS INDEX: 22.01 KG/M2 | WEIGHT: 148.59 LBS | DIASTOLIC BLOOD PRESSURE: 88 MMHG | RESPIRATION RATE: 16 BRPM | TEMPERATURE: 97.8 F | SYSTOLIC BLOOD PRESSURE: 152 MMHG

## 2020-09-22 DIAGNOSIS — K29.70 GASTRITIS: ICD-10-CM

## 2020-09-22 DIAGNOSIS — K59.00 CONSTIPATION, UNSPECIFIED CONSTIPATION TYPE: ICD-10-CM

## 2020-09-22 DIAGNOSIS — R10.13 EPIGASTRIC PAIN: Primary | ICD-10-CM

## 2020-09-22 DIAGNOSIS — R10.9 ABDOMINAL PAIN: ICD-10-CM

## 2020-09-22 LAB
ALBUMIN SERPL BCP-MCNC: 3.6 G/DL (ref 3.5–5)
ALP SERPL-CCNC: 74 U/L (ref 46–116)
ALT SERPL W P-5'-P-CCNC: 117 U/L (ref 12–78)
ANION GAP SERPL CALCULATED.3IONS-SCNC: 7 MMOL/L (ref 4–13)
AST SERPL W P-5'-P-CCNC: 21 U/L (ref 5–45)
ATRIAL RATE: 76 BPM
BASOPHILS # BLD AUTO: 0.05 THOUSANDS/ΜL (ref 0–0.1)
BASOPHILS NFR BLD AUTO: 1 % (ref 0–1)
BILIRUB SERPL-MCNC: 0.7 MG/DL (ref 0.2–1)
BUN SERPL-MCNC: 18 MG/DL (ref 5–25)
CALCIUM SERPL-MCNC: 9.3 MG/DL (ref 8.3–10.1)
CHLORIDE SERPL-SCNC: 98 MMOL/L (ref 100–108)
CO2 SERPL-SCNC: 33 MMOL/L (ref 21–32)
CREAT SERPL-MCNC: 0.89 MG/DL (ref 0.6–1.3)
EOSINOPHIL # BLD AUTO: 0.01 THOUSAND/ΜL (ref 0–0.61)
EOSINOPHIL NFR BLD AUTO: 0 % (ref 0–6)
ERYTHROCYTE [DISTWIDTH] IN BLOOD BY AUTOMATED COUNT: 13 % (ref 11.6–15.1)
GFR SERPL CREATININE-BSD FRML MDRD: 90 ML/MIN/1.73SQ M
GLUCOSE SERPL-MCNC: 110 MG/DL (ref 65–140)
HCT VFR BLD AUTO: 44.3 % (ref 36.5–49.3)
HGB BLD-MCNC: 14.6 G/DL (ref 12–17)
IMM GRANULOCYTES # BLD AUTO: 0.05 THOUSAND/UL (ref 0–0.2)
IMM GRANULOCYTES NFR BLD AUTO: 1 % (ref 0–2)
LYMPHOCYTES # BLD AUTO: 2.52 THOUSANDS/ΜL (ref 0.6–4.47)
LYMPHOCYTES NFR BLD AUTO: 28 % (ref 14–44)
MCH RBC QN AUTO: 29.4 PG (ref 26.8–34.3)
MCHC RBC AUTO-ENTMCNC: 33 G/DL (ref 31.4–37.4)
MCV RBC AUTO: 89 FL (ref 82–98)
MONOCYTES # BLD AUTO: 0.58 THOUSAND/ΜL (ref 0.17–1.22)
MONOCYTES NFR BLD AUTO: 6 % (ref 4–12)
NEUTROPHILS # BLD AUTO: 5.91 THOUSANDS/ΜL (ref 1.85–7.62)
NEUTS SEG NFR BLD AUTO: 64 % (ref 43–75)
NRBC BLD AUTO-RTO: 0 /100 WBCS
P AXIS: 61 DEGREES
PLATELET # BLD AUTO: 283 THOUSANDS/UL (ref 149–390)
PMV BLD AUTO: 9.5 FL (ref 8.9–12.7)
POTASSIUM SERPL-SCNC: 3.5 MMOL/L (ref 3.5–5.3)
PR INTERVAL: 124 MS
PROT SERPL-MCNC: 7.4 G/DL (ref 6.4–8.2)
QRS AXIS: 76 DEGREES
QRSD INTERVAL: 118 MS
QT INTERVAL: 384 MS
QTC INTERVAL: 432 MS
RBC # BLD AUTO: 4.97 MILLION/UL (ref 3.88–5.62)
SODIUM SERPL-SCNC: 138 MMOL/L (ref 136–145)
T WAVE AXIS: 68 DEGREES
TROPONIN I SERPL-MCNC: <0.02 NG/ML
TROPONIN I SERPL-MCNC: <0.02 NG/ML
VENTRICULAR RATE: 76 BPM
WBC # BLD AUTO: 9.12 THOUSAND/UL (ref 4.31–10.16)

## 2020-09-22 PROCEDURE — 93010 ELECTROCARDIOGRAM REPORT: CPT | Performed by: INTERNAL MEDICINE

## 2020-09-22 PROCEDURE — 96374 THER/PROPH/DIAG INJ IV PUSH: CPT

## 2020-09-22 PROCEDURE — 80053 COMPREHEN METABOLIC PANEL: CPT | Performed by: EMERGENCY MEDICINE

## 2020-09-22 PROCEDURE — NC001 PR NO CHARGE: Performed by: INTERNAL MEDICINE

## 2020-09-22 PROCEDURE — 36415 COLL VENOUS BLD VENIPUNCTURE: CPT | Performed by: EMERGENCY MEDICINE

## 2020-09-22 PROCEDURE — 96372 THER/PROPH/DIAG INJ SC/IM: CPT

## 2020-09-22 PROCEDURE — 84484 ASSAY OF TROPONIN QUANT: CPT | Performed by: EMERGENCY MEDICINE

## 2020-09-22 PROCEDURE — C9113 INJ PANTOPRAZOLE SODIUM, VIA: HCPCS | Performed by: EMERGENCY MEDICINE

## 2020-09-22 PROCEDURE — 74177 CT ABD & PELVIS W/CONTRAST: CPT

## 2020-09-22 PROCEDURE — 99285 EMERGENCY DEPT VISIT HI MDM: CPT

## 2020-09-22 PROCEDURE — 99220 PR INITIAL OBSERVATION CARE/DAY 70 MINUTES: CPT | Performed by: INTERNAL MEDICINE

## 2020-09-22 PROCEDURE — G1004 CDSM NDSC: HCPCS

## 2020-09-22 PROCEDURE — 85025 COMPLETE CBC W/AUTO DIFF WBC: CPT | Performed by: EMERGENCY MEDICINE

## 2020-09-22 PROCEDURE — 93005 ELECTROCARDIOGRAM TRACING: CPT

## 2020-09-22 PROCEDURE — 99285 EMERGENCY DEPT VISIT HI MDM: CPT | Performed by: EMERGENCY MEDICINE

## 2020-09-22 PROCEDURE — 96375 TX/PRO/DX INJ NEW DRUG ADDON: CPT

## 2020-09-22 RX ORDER — BISACODYL 10 MG
10 SUPPOSITORY, RECTAL RECTAL DAILY PRN
Qty: 12 SUPPOSITORY | Refills: 0 | Status: SHIPPED | OUTPATIENT
Start: 2020-09-22 | End: 2021-09-13 | Stop reason: ALTCHOICE

## 2020-09-22 RX ORDER — AMOXICILLIN 250 MG
1 CAPSULE ORAL
Qty: 7 TABLET | Refills: 0 | Status: SHIPPED | OUTPATIENT
Start: 2020-09-22 | End: 2021-09-13 | Stop reason: ALTCHOICE

## 2020-09-22 RX ORDER — HALOPERIDOL 5 MG/ML
2 INJECTION INTRAMUSCULAR ONCE
Status: COMPLETED | OUTPATIENT
Start: 2020-09-22 | End: 2020-09-22

## 2020-09-22 RX ORDER — SODIUM CHLORIDE 9 MG/ML
3 INJECTION INTRAVENOUS
Status: DISCONTINUED | OUTPATIENT
Start: 2020-09-22 | End: 2020-09-22 | Stop reason: HOSPADM

## 2020-09-22 RX ORDER — MORPHINE SULFATE 4 MG/ML
3 INJECTION, SOLUTION INTRAMUSCULAR; INTRAVENOUS EVERY 4 HOURS PRN
Status: DISCONTINUED | OUTPATIENT
Start: 2020-09-22 | End: 2020-09-22 | Stop reason: HOSPADM

## 2020-09-22 RX ORDER — MAGNESIUM HYDROXIDE/ALUMINUM HYDROXICE/SIMETHICONE 120; 1200; 1200 MG/30ML; MG/30ML; MG/30ML
30 SUSPENSION ORAL EVERY 4 HOURS PRN
Qty: 355 ML | Refills: 0 | Status: SHIPPED | OUTPATIENT
Start: 2020-09-22 | End: 2020-10-02

## 2020-09-22 RX ORDER — FINASTERIDE 5 MG/1
5 TABLET, FILM COATED ORAL DAILY
Status: DISCONTINUED | OUTPATIENT
Start: 2020-09-22 | End: 2020-09-22 | Stop reason: HOSPADM

## 2020-09-22 RX ORDER — SUCRALFATE 1 G/1
1 TABLET ORAL
Status: DISCONTINUED | OUTPATIENT
Start: 2020-09-22 | End: 2020-09-22 | Stop reason: HOSPADM

## 2020-09-22 RX ORDER — POLYETHYLENE GLYCOL 3350 17 G/17G
17 POWDER, FOR SOLUTION ORAL DAILY
Status: DISCONTINUED | OUTPATIENT
Start: 2020-09-22 | End: 2020-09-22 | Stop reason: HOSPADM

## 2020-09-22 RX ORDER — ONDANSETRON 4 MG/1
4 TABLET, ORALLY DISINTEGRATING ORAL EVERY 6 HOURS PRN
Qty: 20 TABLET | Refills: 0 | Status: SHIPPED | OUTPATIENT
Start: 2020-09-22 | End: 2020-10-02 | Stop reason: ALTCHOICE

## 2020-09-22 RX ORDER — SIMETHICONE 20 MG/.3ML
40 EMULSION ORAL EVERY 6 HOURS PRN
Status: DISCONTINUED | OUTPATIENT
Start: 2020-09-22 | End: 2020-09-22 | Stop reason: HOSPADM

## 2020-09-22 RX ORDER — PANTOPRAZOLE SODIUM 40 MG/1
40 INJECTION, POWDER, FOR SOLUTION INTRAVENOUS EVERY 12 HOURS SCHEDULED
Status: DISCONTINUED | OUTPATIENT
Start: 2020-09-22 | End: 2020-09-22 | Stop reason: HOSPADM

## 2020-09-22 RX ORDER — MAGNESIUM HYDROXIDE/ALUMINUM HYDROXICE/SIMETHICONE 120; 1200; 1200 MG/30ML; MG/30ML; MG/30ML
30 SUSPENSION ORAL ONCE
Status: COMPLETED | OUTPATIENT
Start: 2020-09-22 | End: 2020-09-22

## 2020-09-22 RX ORDER — METHOCARBAMOL 500 MG/1
500 TABLET, FILM COATED ORAL EVERY 6 HOURS PRN
Status: DISCONTINUED | OUTPATIENT
Start: 2020-09-22 | End: 2020-09-22 | Stop reason: HOSPADM

## 2020-09-22 RX ORDER — MORPHINE SULFATE 4 MG/ML
4 INJECTION, SOLUTION INTRAMUSCULAR; INTRAVENOUS EVERY 4 HOURS PRN
Status: DISCONTINUED | OUTPATIENT
Start: 2020-09-22 | End: 2020-09-22

## 2020-09-22 RX ORDER — SUCRALFATE 1 G/1
1 TABLET ORAL ONCE
Status: COMPLETED | OUTPATIENT
Start: 2020-09-22 | End: 2020-09-22

## 2020-09-22 RX ORDER — PANTOPRAZOLE SODIUM 40 MG/1
40 INJECTION, POWDER, FOR SOLUTION INTRAVENOUS ONCE
Status: COMPLETED | OUTPATIENT
Start: 2020-09-22 | End: 2020-09-22

## 2020-09-22 RX ORDER — ONDANSETRON 2 MG/ML
4 INJECTION INTRAMUSCULAR; INTRAVENOUS ONCE
Status: COMPLETED | OUTPATIENT
Start: 2020-09-22 | End: 2020-09-22

## 2020-09-22 RX ORDER — LIDOCAINE 50 MG/G
1 PATCH TOPICAL DAILY
Status: DISCONTINUED | OUTPATIENT
Start: 2020-09-22 | End: 2020-09-22 | Stop reason: HOSPADM

## 2020-09-22 RX ORDER — NICOTINE 21 MG/24HR
1 PATCH, TRANSDERMAL 24 HOURS TRANSDERMAL DAILY
Status: DISCONTINUED | OUTPATIENT
Start: 2020-09-22 | End: 2020-09-22 | Stop reason: HOSPADM

## 2020-09-22 RX ORDER — SUCRALFATE ORAL 1 G/10ML
1000 SUSPENSION ORAL ONCE
Status: DISCONTINUED | OUTPATIENT
Start: 2020-09-22 | End: 2020-09-22 | Stop reason: ALTCHOICE

## 2020-09-22 RX ORDER — AMOXICILLIN 250 MG
1 CAPSULE ORAL
Status: DISCONTINUED | OUTPATIENT
Start: 2020-09-22 | End: 2020-09-22 | Stop reason: HOSPADM

## 2020-09-22 RX ORDER — SODIUM CHLORIDE, SODIUM GLUCONATE, SODIUM ACETATE, POTASSIUM CHLORIDE, MAGNESIUM CHLORIDE, SODIUM PHOSPHATE, DIBASIC, AND POTASSIUM PHOSPHATE .53; .5; .37; .037; .03; .012; .00082 G/100ML; G/100ML; G/100ML; G/100ML; G/100ML; G/100ML; G/100ML
125 INJECTION, SOLUTION INTRAVENOUS CONTINUOUS
Status: DISCONTINUED | OUTPATIENT
Start: 2020-09-22 | End: 2020-09-22 | Stop reason: HOSPADM

## 2020-09-22 RX ORDER — POLYETHYLENE GLYCOL 3350 17 G/17G
17 POWDER, FOR SOLUTION ORAL DAILY
Qty: 14 EACH | Refills: 0 | Status: SHIPPED | OUTPATIENT
Start: 2020-09-22 | End: 2020-10-02 | Stop reason: ALTCHOICE

## 2020-09-22 RX ORDER — ONDANSETRON 2 MG/ML
4 INJECTION INTRAMUSCULAR; INTRAVENOUS EVERY 4 HOURS PRN
Status: DISCONTINUED | OUTPATIENT
Start: 2020-09-22 | End: 2020-09-22 | Stop reason: HOSPADM

## 2020-09-22 RX ORDER — GABAPENTIN 250 MG/5ML
300 SOLUTION ORAL 3 TIMES DAILY
Status: DISCONTINUED | OUTPATIENT
Start: 2020-09-22 | End: 2020-09-22 | Stop reason: HOSPADM

## 2020-09-22 RX ORDER — MAGNESIUM HYDROXIDE/ALUMINUM HYDROXICE/SIMETHICONE 120; 1200; 1200 MG/30ML; MG/30ML; MG/30ML
30 SUSPENSION ORAL EVERY 4 HOURS PRN
Status: DISCONTINUED | OUTPATIENT
Start: 2020-09-22 | End: 2020-09-22 | Stop reason: HOSPADM

## 2020-09-22 RX ORDER — LIDOCAINE HYDROCHLORIDE 20 MG/ML
15 SOLUTION OROPHARYNGEAL ONCE
Status: COMPLETED | OUTPATIENT
Start: 2020-09-22 | End: 2020-09-22

## 2020-09-22 RX ORDER — BISACODYL 10 MG
10 SUPPOSITORY, RECTAL RECTAL DAILY PRN
Status: DISCONTINUED | OUTPATIENT
Start: 2020-09-22 | End: 2020-09-22 | Stop reason: HOSPADM

## 2020-09-22 RX ADMIN — ONDANSETRON 4 MG: 2 INJECTION INTRAMUSCULAR; INTRAVENOUS at 11:42

## 2020-09-22 RX ADMIN — SUCRALFATE 1 G: 1 TABLET ORAL at 11:50

## 2020-09-22 RX ADMIN — SUCRALFATE 1 G: 1 TABLET ORAL at 04:23

## 2020-09-22 RX ADMIN — MORPHINE SULFATE 3 MG: 4 INJECTION INTRAVENOUS at 11:44

## 2020-09-22 RX ADMIN — MORPHINE SULFATE 2 MG: 2 INJECTION, SOLUTION INTRAMUSCULAR; INTRAVENOUS at 06:05

## 2020-09-22 RX ADMIN — ALUMINUM HYDROXIDE, MAGNESIUM HYDROXIDE, AND SIMETHICONE 30 ML: 200; 200; 20 SUSPENSION ORAL at 04:21

## 2020-09-22 RX ADMIN — IOHEXOL 100 ML: 350 INJECTION, SOLUTION INTRAVENOUS at 05:30

## 2020-09-22 RX ADMIN — HALOPERIDOL LACTATE 2 MG: 5 INJECTION, SOLUTION INTRAMUSCULAR at 04:58

## 2020-09-22 RX ADMIN — MORPHINE SULFATE 4 MG: 4 INJECTION INTRAVENOUS at 07:25

## 2020-09-22 RX ADMIN — MORPHINE SULFATE 2 MG: 2 INJECTION, SOLUTION INTRAMUSCULAR; INTRAVENOUS at 09:51

## 2020-09-22 RX ADMIN — ALUMINUM HYDROXIDE, MAGNESIUM HYDROXIDE, AND SIMETHICONE 30 ML: 200; 200; 20 SUSPENSION ORAL at 11:50

## 2020-09-22 RX ADMIN — PANTOPRAZOLE SODIUM 40 MG: 40 INJECTION, POWDER, FOR SOLUTION INTRAVENOUS at 04:23

## 2020-09-22 RX ADMIN — LIDOCAINE HYDROCHLORIDE 15 ML: 20 SOLUTION ORAL; TOPICAL at 04:21

## 2020-09-22 NOTE — CASE MANAGEMENT
Patient was discharged from [facility] yesterday  Patient returned to [facility] today due to unrelenting chest pain  Patient requesting transfer to [place]  Per chart review, no bed availability at [place] and no confirm date of bed availability  CM to continue following

## 2020-09-22 NOTE — ED NOTES
Pt woke up and rang call bell c/o 10/10 pain  Dr Rosi page texted and made aware  Awaiting response       Fareed Russo RN  09/22/20 0892

## 2020-09-22 NOTE — ED NOTES
The patient's pain remains uncontrolled and he is yelling and rolling around in bed in pain  SLIM contacted via [employer]  Awaiting response       Juvenal Bautista RN  09/22/20 0267

## 2020-09-22 NOTE — ASSESSMENT & PLAN NOTE
Upper abdominal lower chest wall pain of uncertain etiology  He was recently in the hospital and had EGD  Found to have some erosions/ulcerations  Would give him intravenous PPI in addition to continuing Carafate and pain medications/antiemetic  Would also give him IV fluids  Patient/family requesting patient to be transferred to [facility] for further workup and also family-patient's daughter close to him

## 2020-09-22 NOTE — ED NOTES
I spoke with the patient's daughter, Dr Yuliet Wei, via phone at the request of the patient and his wife  The patient's condition and ED stay was discussed and she is requesting a phone call from the admitting physician/AP  The patient and his daughter would like to request transfer to [place] for further evaluation       Yuliet Wei [facility] Woodruff RN  09/22/20 6803

## 2020-09-22 NOTE — ASSESSMENT & PLAN NOTE
Malnutrition Findings:           BMI Findings: Body mass index is 21 94 kg/m²  He is somewhat skinny  Spoke with patient's daughter  As per daughter, he has been like this all his life    Would advance his diet as tolerated

## 2020-09-22 NOTE — ED PROVIDER NOTES
History  Chief Complaint   Patient presents with    Chest Pain     The patient complains of unrelenting chest pain  He was discharged from here 9/21 afternoon after being admitted for several days and diagnosed with gastritis and gastric ulcers  61-year-old male presents after being discharged yesterday with recurrent episode of periumbilical pain  Patient admitted by myself previously and seen for this  Subsequent evaluation by Gastroenterology including EGD demonstrating gastritis  Patient was placed on appropriate medications, was discharged and subsequently had worsening of symptoms  Patient states the pain has been persistent despite treatment with multiple medications  Patient states compliance with the medications  Patient denies any change in the symptoms other than the severity has been waxing and waning, most notably worsening at night  Impression and plan:  Recurrent epigastric pain, considering recent evaluation this is likely secondary to gastritis  Patient discharged yesterday, states he has been unable to tolerate any oral intake due to worsening of pain  States he planned to leave the hospital and go the [address] [place] [address] for further evaluation however he had felt slightly better however it subsequently worsened to the point that he could not tolerate the pain or transport to [place]  Patient states he has been taking his medications as directed  Per discharge note, there was concern for constipation due to opiate pain medication though patient and wife states he has eaten little since     Patient with prior evaluation for cardiac etiology that was negative  Considering patient's age, will repeat this while treating patient symptomatically but much more likely based on patient's symptoms to be secondary to recurrent gastritis  Will attempt treatment with traditional and alternative medications; will attempt to avoid opiate pain medication considering history  Will evaluate need for additional imaging if symptoms persist       History provided by:  Patient  Chest Pain   Pain location:  Epigastric  Pain quality: burning    Pain radiates to:  Does not radiate  Pain severity:  Severe  Onset quality:  Gradual  Timing:  Constant  Progression:  Waxing and waning  Relieved by:  Nothing  Worsened by:  Nothing tried  Ineffective treatments:  None tried  Associated symptoms: abdominal pain and heartburn    Associated symptoms: no altered mental status, no anorexia, no back pain, no claudication, no cough, no diaphoresis, no dizziness, no dysphagia, no fatigue, no fever, no nausea, no numbness, no orthopnea, no palpitations, no shortness of breath, not vomiting and no weakness        Prior to Admission Medications   Prescriptions Last Dose Informant Patient Reported?  Taking?   aspirin (ECOTRIN LOW STRENGTH) 81 mg EC tablet   Yes No   Sig: Take 81 mg by mouth daily   finasteride (PROSCAR) 5 mg tablet   No No   Sig: Take 1 tablet (5 mg total) by mouth daily   gabapentin (NEURONTIN) 250 mg/5 mL solution   No No   Sig: Take 6 mL (300 mg total) by mouth 3 (three) times a day   lidocaine (LIDODERM) 5 %   No No   Sig: Apply 1 patch topically daily Remove & Discard patch within 12 hours or as directed by MD   methocarbamol (ROBAXIN) 500 mg tablet   No No   Sig: Take 1 tablet (500 mg total) by mouth every 6 (six) hours as needed for muscle spasms   oxyCODONE (ROXICODONE) 5 mg immediate release tablet   No No   Sig: Take 1 tablet (5 mg total) by mouth every 6 (six) hours as needed for severe pain for up to 10 daysMax Daily Amount: 20 mg   pantoprazole (PROTONIX) 40 mg tablet   No No   Sig: Take 1 tablet (40 mg total) by mouth 2 (two) times a day before meals   simethicone (MYLICON) 40 YV/5 5 mL drops   No No   Sig: Take 0 6 mL (40 mg total) by mouth every 6 (six) hours as needed for flatulence   sucralfate (CARAFATE) 1 g tablet   No No   Sig: Take 1 tablet (1 g total) by mouth 4 (four) times a day (before meals and at bedtime)      Facility-Administered Medications: None       Past Medical History:   Diagnosis Date    Anemia     Atrial fibrillation (HCC)     COPD [facility]chronic obstructive pulmonary disease) ([facility] [address] )     Hernia cerebri (HCC)     Hyperlipidemia     Lung nodule     RESOLVED 26 JUN 2016    Renal calculi     Sexual dysfunction        Past Surgical History:   Procedure Laterality Date    FACIAL COSMETIC SURGERY      pt has metal hardware throughout face    FACIAL COSMETIC SURGERY      KIDNEY SURGERY      ORBITAL FRACTURE SURGERY      CLOSED TREATMENT OF ORBITAL FRACTURE(NON-BLOWOUT)    TONSILLECTOMY      VASECTOMY         Family History   Problem Relation Age of Onset    Stroke Mother     Diabetes Mother     Hypertension Mother     Skin cancer Mother     Heart disease Mother     Scoliosis Sister      I have reviewed and agree with the history as documented  E-Cigarette/Vaping    E-Cigarette Use Never User     Cartridges/Day 0     Quit Date 9/17/20      E-Cigarette/Vaping Substances    Nicotine No     THC No     CBD No     Flavoring No     Other No     Unknown No      Social History     Tobacco Use    Smoking status: Current Every Day Smoker     Packs/day: 1 00     Types: Cigarettes    Smokeless tobacco: Former User     Quit date: 9/17/2020   Substance Use Topics    Alcohol use: Never     Frequency: Never     Drinks per session: Patient refused     Binge frequency: Never    Drug use: No       Review of Systems   Constitutional: Negative for diaphoresis, fatigue and fever  HENT: Negative for trouble swallowing  Respiratory: Negative for cough and shortness of breath  Cardiovascular: Positive for chest pain  Negative for palpitations, orthopnea and claudication  Gastrointestinal: Positive for abdominal pain and heartburn  Negative for anorexia, nausea and vomiting  Musculoskeletal: Negative for back pain     Neurological: Negative for dizziness, weakness and numbness  All other systems reviewed and are negative  Physical Exam  Physical Exam  Vitals signs reviewed  Constitutional:       General: He is in acute distress  Appearance: He is not diaphoretic  HENT:      Head: Atraumatic  Eyes:      Pupils: Pupils are equal, round, and reactive to light  Neck:      Musculoskeletal: Normal range of motion and neck supple  Cardiovascular:      Rate and Rhythm: Normal rate and regular rhythm  Heart sounds: Normal heart sounds  Pulmonary:      Effort: Pulmonary effort is normal       Breath sounds: Normal breath sounds  Abdominal:      General: There is no distension  Palpations: Abdomen is soft  Tenderness: There is abdominal tenderness  There is no guarding or rebound  Musculoskeletal:         General: No deformity  Right lower leg: He exhibits no tenderness  No edema  Left lower leg: He exhibits no tenderness  No edema  Skin:     General: Skin is warm and dry  Neurological:      General: No focal deficit present  Mental Status: He is alert           Vital Signs  ED Triage Vitals   Temp Pulse Respirations Blood Pressure SpO2   -- 09/22/20 0332 09/22/20 0332 09/22/20 0335 09/22/20 0335    80 22 (!) 173/92 99 %      Temp src Heart Rate Source Patient Position - Orthostatic VS BP Location FiO2 (%)   -- 09/22/20 0332 09/22/20 0335 09/22/20 0335 --    Monitor Lying Left arm       Pain Score       09/22/20 0335       Worst Possible Pain           Vitals:    09/22/20 0332 09/22/20 0335 09/22/20 0345 09/22/20 0600   BP:  (!) 173/92 (!) 173/92 152/71   Pulse: 80 79 76 88   Patient Position - Orthostatic VS:  Lying Lying Lying         Visual Acuity      ED Medications  Medications   sodium chloride (PF) 0 9 % injection 3 mL (has no administration in time range)   morphine injection 2 mg (2 mg Intravenous Given 9/22/20 0605)   Lidocaine Viscous HCl (XYLOCAINE) 2 % mucosal solution 15 mL (15 mL Swish & Swallow Given 9/22/20 0421)   aluminum-magnesium hydroxide-simethicone (MYLANTA) 200-200-20 mg/5 mL oral suspension 30 mL (30 mL Oral Given 9/22/20 0421)   pantoprazole (PROTONIX) injection 40 mg (40 mg Intravenous Given 9/22/20 0423)   sucralfate (CARAFATE) tablet 1 g (1 g Oral Given 9/22/20 0423)   haloperidol lactate (HALDOL) injection 2 mg (2 mg Intramuscular Given 9/22/20 0458)   iohexol (OMNIPAQUE) 350 MG/ML injection (MULTI-DOSE) 100 mL (100 mL Intravenous Given 9/22/20 1830)       Diagnostic Studies  Results Reviewed     Procedure Component Value Units Date/Time    Comprehensive metabolic panel [[id number]]  (Abnormal) Collected:  09/22/20 0347    Lab Status:  Final result Specimen:  Blood from Arm, Right Updated:  09/22/20 0438     Sodium 138 mmol/L      Potassium 3 5 mmol/L      Chloride 98 mmol/L      CO2 33 mmol/L      ANION GAP 7 mmol/L      BUN 18 mg/dL      Creatinine 0 89 mg/dL      Glucose 110 mg/dL      Calcium 9 3 mg/dL      AST 21 U/L       U/L      Alkaline Phosphatase 74 U/L      Total Protein 7 4 g/dL      Albumin 3 6 g/dL      Total Bilirubin 0 70 mg/dL      eGFR 90 ml/min/1 73sq m     Narrative:       [place] guidelines for Chronic Kidney Disease (CKD):     Stage 1 with normal or high GFR (GFR > 90 mL/min/1 73 square meters)    Stage 2 Mild CKD (GFR = 60-89 mL/min/1 73 square meters)    Stage 3A Moderate CKD (GFR = 45-59 mL/min/1 73 square meters)    Stage 3B Moderate CKD (GFR = 30-44 mL/min/1 73 square meters)    Stage 4 Severe CKD (GFR = 15-29 mL/min/1 73 square meters)    Stage 5 End Stage CKD (GFR <15 mL/min/1 73 square meters)  Note: GFR calculation is accurate only with a steady state creatinine    Troponin I [[id number]]  (Normal) Collected:  09/22/20 0347    Lab Status:  Final result Specimen:  Blood from Arm, Right Updated:  09/22/20 0431     Troponin I <0 02 ng/mL     CBC and differential [[id number]] Collected:  09/22/20 0347    Lab Status:  Final result Specimen: Blood from Arm, Right Updated:  09/22/20 0407     WBC 9 12 Thousand/uL      RBC 4 97 Million/uL      Hemoglobin 14 6 g/dL      Hematocrit 44 3 %      MCV 89 fL      MCH 29 4 pg      MCHC 33 0 g/dL      RDW 13 0 %      MPV 9 5 fL      Platelets 855 Thousands/uL      nRBC 0 /100 WBCs      Neutrophils Relative 64 %      Immat GRANS % 1 %      Lymphocytes Relative 28 %      Monocytes Relative 6 %      Eosinophils Relative 0 %      Basophils Relative 1 %      Neutrophils Absolute 5 91 Thousands/µL      Immature Grans Absolute 0 05 Thousand/uL      Lymphocytes Absolute 2 52 Thousands/µL      Monocytes Absolute 0 58 Thousand/µL      Eosinophils Absolute 0 01 Thousand/µL      Basophils Absolute 0 05 Thousands/µL     Troponin I repeat in 3hrs [[id number]]     Lab Status:  No result Specimen:  Blood                  CT abdomen pelvis with contrast   Final Result by Abraham Faulkner MD (09/22 0415)      No acute pathology  Workstation performed: [id number]                    Procedures  Procedures         ED Course  ED Course as of Sep 22 0621   Tue Sep 22, 2020   7907 EKG demonstrates normal sinus rhythm with no acute ST segment changes  This appears grossly similar to prior EKG 2 days ago  9578 Patient still with persistent pain, will proceed to CT imaging of patient's and the pelvis to evaluate for alternative etiologies include perforation  Discussed limitations of narcotic pain medication, recently concerns for potential pain being secondary to constipation though I feel is less likely the patient's pain being secondary to gastritis  Discussed treatment alternative agents and will treat patient with haloperidol for unclear abdominal pain  Will reassess after CT imaging is completed need for additional treatment and management       0603 Patient persistently in pain, will reorder what inpatient team had ordered which according to pharmacy is morphine 2 mg q 4 hours      Patient reportedly had attempted to have him self brought to [facility] for additional evaluation but patient's wife states he cannot go there secondary to his severe pain  Patient persistently in pain, curled in a fetal position  Patient amenable for admission here for continued monitoring and re-evaluation by Gastroenterology  SBIRT 20yo+      Most Recent Value   SBIRT (22 yo +)   In order to provide better care to our patients, we are screening all of our patients for alcohol and drug use  Would it be okay to ask you these screening questions? Yes Filed at: 09/22/2020 5594   Initial Alcohol Screen: US AUDIT-C    1  How often do you have a drink containing alcohol?  0 Filed at: 09/22/2020 0338   2  How many drinks containing alcohol do you have on a typical day you are drinking? 0 Filed at: 09/22/2020 0338   3a  Male UNDER 65: How often do you have five or more drinks on one occasion? 0 Filed at: 09/22/2020 0338   Audit-C Score  0 Filed at: 09/22/2020 4268   NATALIIA: How many times in the past year have you    Used an illegal drug or used a prescription medication for non-medical reasons? Never Filed at: 09/22/2020 5719                  MDM    Disposition  Final diagnoses:   Epigastric pain   Gastritis     Time reflects when diagnosis was documented in both MDM as applicable and the Disposition within this note     Time User Action Codes Description Comment    9/22/2020  6:04 AM Lenora Watson Add [R10 13] Epigastric pain     9/22/2020  6:04 AM Lenora Watson Add [K29 70] Gastritis       ED Disposition     ED Disposition Condition Date/Time Comment    Admit Stable Tue Sep 22, 2020  6:04 AM Case was discussed with RUPESH and the patient's admission status was agreed to be Admission Status: observation status to the service of Dr Tram Banda    None         Patient's Medications   Discharge Prescriptions    No medications on file     No discharge procedures on file      PDMP Review Value Time User    PDMP Reviewed  Yes 9/17/2020  4:09 PM Adarsh Doshi MD          ED Provider  Electronically Signed by           James Mccann MD  09/22/20 9650

## 2020-09-22 NOTE — H&P
History and Physical - [facility] Internal Medicine    Patient Information: Margarita Olmstead 60 y o  male MRN: [id number]  Unit/Bed#: ED 32 Encounter: [id number]  Admitting Physician: Jamal Vasquez MD  PCP: Mónica Musa MD  Date of Admission:  09/22/20    Assessment/Plan:      * Abdominal pain  Assessment & Plan  Upper abdominal lower chest wall pain of uncertain etiology  He was recently in the hospital and had EGD  Found to have some erosions/ulcerations  Would give him intravenous PPI in addition to continuing Carafate and pain medications/antiemetic  Would also give him IV fluids  Patient/family requesting patient to be transferred to [facility] for further workup and also family-patient's daughter close to him  History of solitary pulmonary nodule  Assessment & Plan  Follow-up on outpatient basis    Severe protein-calorie malnutrition [facility])  Assessment & Plan  Malnutrition Findings:           BMI Findings: Body mass index is 21 94 kg/m²  He is somewhat skinny  Spoke with patient's daughter  As per daughter, he has been like this all his life  Would advance his diet as tolerated    Acute back pain  Assessment & Plan  Chronic back pain issues  Continue with Lidoderm and pain control    Nicotine dependence  Assessment & Plan  Nicotine patch and encouraged to quit    BPH associated with nocturia  Assessment & Plan  Continue home medication      Present on Admission:   Abdominal pain   Acute back pain   BPH associated with nocturia   Nicotine dependence   Severe protein-calorie malnutrition (HCC)        VTE Prophylaxis: Enoxaparin (Lovenox)  / sequential compression device   Code Status:  Full code  POLST: There is no POLST form on file for this patient (pre-hospital)    Anticipated Length of Stay:  Patient will be admitted on an Observation basis with an anticipated length of stay of  less than 2 midnights     Justification for Hospital Stay:  Abdominal pain    Total Time for Visit, including Counseling / Coordination of Care: 45+ minutes  Greater than 50% of this total time spent on direct patient counseling and coordination of care  Chief Complaint:   Abdominal pain intractable    History of Present Illness:    Carole Mims is a 60 y o  male who presents with intractable abdominal pain  He was discharged home yesterday and he was taking oral medications, however, pain started to come back any came back to the hospital   He states that he has been in pain since he has been in the hospital   He was quite uncomfortable when I entered the room  He also received pain medications  However, while work walking, he was not really acting like in lot of discomfort whatsoever  Maybe he had just received the pain medications  He is pointing in his upper part of abdomen around lower chest wall area and also upper back pain  He states that he is not passing any flatus  No bowel movement today  He had bowel movement yesterday  No fever or chills  No hematemesis, melena, hematochezia          Review of Systems    All systems are reviewed  Positive as per history of presenting illness  Patient answered no to all other questions  Past Medical and Surgical History:     Past Medical History:   Diagnosis Date    Anemia     Atrial fibrillation ([facility] )     COPD (chronic obstructive pulmonary disease) ([facility] )     Hernia cerebri (HCC)     Hyperlipidemia     Lung nodule     RESOLVED 26 JUN 2016    Renal calculi     Sexual dysfunction        Past Surgical History:   Procedure Laterality Date    FACIAL COSMETIC SURGERY      pt has metal hardware throughout face    FACIAL COSMETIC SURGERY      KIDNEY SURGERY      ORBITAL FRACTURE SURGERY      CLOSED TREATMENT OF ORBITAL FRACTURE(NON-BLOWOUT)    TONSILLECTOMY      VASECTOMY         Meds/Allergies:    Prior to Admission medications    Medication Sig Start Date End Date Taking?  Authorizing Provider   aspirin (ECOTRIN LOW STRENGTH) 81 mg EC tablet Take 81 mg by mouth daily    Historical Provider, MD   finasteride (PROSCAR) 5 mg tablet Take 1 tablet (5 mg total) by mouth daily 2/13/20   Marizol Soto PA-C   gabapentin (NEURONTIN) 250 mg/5 mL solution Take 6 mL (300 mg total) by mouth 3 (three) times a day 9/21/20   Brisa Lucas MD   lidocaine (LIDODERM) 5 % Apply 1 patch topically daily Remove & Discard patch within 12 hours or as directed by MD 9/22/20   Brisa Lucas MD   methocarbamol (ROBAXIN) 500 mg tablet Take 1 tablet (500 mg total) by mouth every 6 (six) hours as needed for muscle spasms 9/21/20   Brisa Lucas MD   oxyCODONE (ROXICODONE) 5 mg immediate release tablet Take 1 tablet (5 mg total) by mouth every 6 (six) hours as needed for severe pain for up to 10 daysMax Daily Amount: 20 mg 9/21/20 10/1/20  Brisa Lucas MD   pantoprazole (PROTONIX) 40 mg tablet Take 1 tablet (40 mg total) by mouth 2 (two) times a day before meals 9/21/20   Brisa Lucas MD   simethicone (MYLICON) 40 SN/2 6 mL drops Take 0 6 mL (40 mg total) by mouth every 6 (six) hours as needed for flatulence 9/21/20   Brisa Lucas MD   sucralfate (CARAFATE) 1 g tablet Take 1 tablet (1 g total) by mouth 4 (four) times a day (before meals and at bedtime) 9/21/20   Brisa Lucas MD     Reviewed as documented above    Allergies:    Allergies   Allergen Reactions    Other Anaphylaxis     Shellfish, shrimp    Dilaudid [Hydromorphone]        Social History:     Marital Status: /Civil Union   Occupation:  Currently not working  Patient Pre-hospital Living Situation:  With family  Patient Pre-hospital Level of Mobility:  Independent  Patient Pre-hospital Diet Restrictions:  None  Substance Use History:   Social History     Substance and Sexual Activity   Alcohol Use Never    Frequency: Never    Drinks per session: Patient refused    Binge frequency: Never     Social History     Tobacco Use   Smoking Status Current Every Day Smoker    Packs/day: 1 00    Types: Cigarettes   Smokeless Tobacco Former User    Quit date: 9/17/2020     Social History     Substance and Sexual Activity   Drug Use No       Family History:    Reviewed and not contributory to his current illness        Physical Exam      Vitals:   Blood Pressure: 133/82 (09/22/20 0731)  Pulse: 73 (09/22/20 0731)  Temperature: 97 8 °F (36 6 °C) (09/22/20 0731)  Temp Source: Oral (09/22/20 0731)  Respirations: 16 (09/22/20 0731)  Height: 5' 9" (175 3 cm) (09/22/20 0335)  Weight - Scale: 67 4 kg (148 lb 9 4 oz) (09/22/20 0335)  SpO2: 97 % (09/22/20 0731)    Vital signs are reviewed as above  Constitutional:  Lying in stretcher  He was somewhat uncomfortable because of pain, however, later on, he was basically lying flat in bed and in not any distress whatsoever  Eyes: EOM grossly intact  Conjunctivae slightly pale  Patient has anicteric sclera  HENT: Oropharynx are slightly dry  Did not notice any significant lesions on the tongue  Head normocephalic  Neck: Neck is supple    There is no significant lymphadenopathy  I also did not notice any significant thyromegaly  Cardiac: I did not hear any rubs or gallop  Patient appears to be in sinus rhythm  Respiratory: Patient not in significant respiratory distress  Air entry in general is good  GI: Abdomen is soft  Mild soreness on palpation in the epigastric area  Bowel sounds are audible  I was not able to appreciate any hepatosplenomegaly  Neurologic:  Patient is awake and alert  Neurological examination is grossly intact  No obvious focal neurological deficit noticed  Skin: Skin is warm and dry  Psychiatric: Mood and affect are pleasant  He is anxious  Musculoskeletal  Patient moving all extremities while in bed  Has chronic back pain  Extremities: Patient has no significant cyanosis, clubbing, or lower extremity edema           Additional Data:     Lab Results: I have personally reviewed pertinent reports        Results from last 7 days   Lab Units 09/22/20  7721 WBC Thousand/uL 9 12   HEMOGLOBIN g/dL 14 6   HEMATOCRIT % 44 3   PLATELETS Thousands/uL 283   NEUTROS PCT % 64   LYMPHS PCT % 28   MONOS PCT % 6   EOS PCT % 0     Results from last 7 days   Lab Units 09/22/20  0347   POTASSIUM mmol/L 3 5   CHLORIDE mmol/L 98*   CO2 mmol/L 33*   BUN mg/dL 18   CREATININE mg/dL 0 89   CALCIUM mg/dL 9 3   ALK PHOS U/L 74   ALT U/L 117*   AST U/L 21           Imaging: I have personally reviewed pertinent reports  Xr Spine Thoracic 3 Vw    Result Date: 9/19/2020  Narrative: THORACIC SPINE INDICATION:   acute onset back pain  COMPARISON:  Chest CT from 1/6/2016  VIEWS:  XR SPINE THORACIC 3 VW FINDINGS: There is no fracture or pathologic bone lesion  Thoracic vertebral alignment is within normal limits  Age related degenerative changes are seen  There is no displacement of the paraspinal line  The pedicles appear intact  Impression: No acute osseous abnormality  Age related degenerative changes are seen  Workstation performed: [id number]     Xr Spine Lumbar Minimum 4 Views Non Injury    Result Date: 9/19/2020  Narrative: LUMBAR SPINE INDICATION:   acute onset back pain  COMPARISON:  Abdomen and pelvic CT from 9/17/2020  VIEWS:  XR SPINE LUMBAR MINIMUM 4 VIEWS NON INJURY FINDINGS: There are 5 non rib bearing lumbar vertebral bodies  There is no evidence of acute fracture or destructive osseous lesion  Alignment is unremarkable  Severe degenerative facet disease at L3-L4, L4-L5, and L5-S1 bilaterally  Severe degenerative disc space narrowing at L5-S1  The pedicles appear intact  There are atherosclerotic calcifications  Soft tissues are otherwise unremarkable  Impression: No acute osseous abnormality  Degenerative changes as described  Workstation performed: [id number]     Ct Chest Wo Contrast    Result Date: 9/19/2020  Narrative: CT CHEST WITHOUT IV CONTRAST INDICATION:   Lung nodule, < 6mm, low cancer risk, follow up exam   Patient is a current smoker   COMPARISON:  Chest CT from 1/6/2016  TECHNIQUE: CT examination of the chest was performed without intravenous contrast   Axial, sagittal, and coronal 2D reformatted images were created from the source data and submitted for interpretation  Radiation dose length product (DLP) for this visit:  113 mGy-cm   This examination, like all CT scans performed in the [facility], was performed utilizing techniques to minimize radiation dose exposure, including the use of iterative reconstruction and automated exposure control  FINDINGS: LUNGS:  There is mild emphysema  There is a 2 mm left upper lobe nodule (series 2 image 27 )  This has been stable since 2016, therefore is benign  There are no new pulmonary nodules  There is no tracheal or endobronchial lesion  PLEURA:  Unremarkable  HEART/GREAT VESSELS:  Unremarkable for patient's age  MEDIASTINUM AND DANITZA:  Unremarkable  CHEST WALL AND LOWER NECK:   Unremarkable  VISUALIZED STRUCTURES IN THE UPPER ABDOMEN:  Unremarkable  OSSEOUS STRUCTURES:  No acute fracture or destructive osseous lesion  Impression: There is mild emphysema  There is a 2 mm left upper lobe nodule (series 2 image 27 )  This has been stable since 2016, therefore is benign  There are no new pulmonary nodules  Workstation performed: [id number]     Xr Abdomen 1 Vw Portable    Result Date: 9/20/2020  Narrative: ABDOMEN INDICATION:   intractable pain  COMPARISON:  Abdomen and pelvic CT from 9/17/2020  VIEWS:  AP supine FINDINGS: There is a nonobstructive bowel gas pattern  No discernible free air on this supine study  Upright or left lateral decubitus imaging is more sensitive to detect subtle free air in the appropriate setting  No pathologic calcifications or soft tissue masses  Visualized lung bases are clear  Visualized osseous structures are unremarkable for the patient's age  Impression: Unremarkable abdomen    Workstation performed: [id number]     Ct Abdomen Pelvis With Contrast    Result Date: 9/22/2020  Narrative: CT ABDOMEN AND PELVIS WITH IV CONTRAST INDICATION:   Epigastric pain  COMPARISON:  9/17/2020  TECHNIQUE:  CT examination of the abdomen and pelvis was performed  Axial, sagittal, and coronal 2D reformatted images were created from the source data and submitted for interpretation  Radiation dose length product (DLP) for this visit:  432 mGy-cm   This examination, like all CT scans performed in the [facility], was performed utilizing techniques to minimize radiation dose exposure, including the use of iterative reconstruction and automated exposure control  IV Contrast:  100 mL of iohexol (OMNIPAQUE) Enteric Contrast:  Enteric contrast was not administered  FINDINGS: ABDOMEN LOWER CHEST:  No clinically significant abnormality identified in the visualized lower chest  LIVER/BILIARY TREE:  Unremarkable  GALLBLADDER:  No calcified gallstones  No pericholecystic inflammatory change  SPLEEN:  Unremarkable  PANCREAS:  Unremarkable  ADRENAL GLANDS:  Unremarkable  KIDNEYS/URETERS:  No hydronephrosis or urinary tract calculus  One or more sharply circumscribed subcentimeter renal hypodensities are present, too small to accurately characterize, and statistically most likely benign findings  According to recent literature (Radiology 2019) no further workup of these findings is recommended  STOMACH AND BOWEL:  Unremarkable  There is moderately increased stool in the colon  APPENDIX:  No findings to suggest appendicitis  ABDOMINOPELVIC CAVITY:  No ascites  No pneumoperitoneum  No lymphadenopathy  VESSELS:  Atherosclerotic changes are present  No evidence of aneurysm  PELVIS REPRODUCTIVE ORGANS:  Unremarkable for patient's age  URINARY BLADDER:  Unremarkable  ABDOMINAL WALL/INGUINAL REGIONS:  Unremarkable  OSSEOUS STRUCTURES:  No acute fracture or destructive osseous lesion  Impression: No acute pathology   Workstation performed: [id number]     Ct Abdomen Pelvis With Contrast    Result Date: 9/17/2020  Narrative: CT ABDOMEN AND PELVIS WITH IV CONTRAST INDICATION:   Epigastric pain  COMPARISON:  None  TECHNIQUE:  CT examination of the abdomen and pelvis was performed  Axial, sagittal, and coronal 2D reformatted images were created from the source data and submitted for interpretation  Radiation dose length product (DLP) for this visit:  413 mGy-cm   This examination, like all CT scans performed in the [facility], was performed utilizing techniques to minimize radiation dose exposure, including the use of iterative reconstruction and automated exposure control  IV Contrast:  100 mL of iohexol (OMNIPAQUE)  was administered intravenously without immediate adverse reaction  Enteric Contrast:  Enteric contrast was not administered  FINDINGS: ABDOMEN LOWER CHEST:  No clinically significant abnormality identified in the visualized lower chest  LIVER/BILIARY TREE:  Unremarkable  GALLBLADDER:  No calcified gallstones  No pericholecystic inflammatory change  SPLEEN:  Unremarkable  PANCREAS:  Unremarkable  ADRENAL GLANDS:  Unremarkable  KIDNEYS/URETERS:  No hydronephrosis or urinary tract calculus  One or more sharply circumscribed subcentimeter renal hypodensities are present, too small to accurately characterize, and statistically most likely benign findings  According to recent literature (Radiology 2019) no further workup of these findings is recommended  STOMACH AND BOWEL:  No bowel obstruction  APPENDIX:  A normal appendix was visualized  ABDOMINOPELVIC CAVITY:  No ascites  No pneumoperitoneum  No lymphadenopathy  VESSELS:  Moderate atherosclerotic calcifications  PELVIS REPRODUCTIVE ORGANS:  Enlarged prostate  URINARY BLADDER:  Unremarkable  ABDOMINAL WALL/INGUINAL REGIONS:  Unremarkable  OSSEOUS STRUCTURES:  No acute fracture or destructive osseous lesion  Disc space narrowing at L5-S1 with sclerotic endplate changes       Impression: No acute inflammatory process identified within the abdomen or pelvis  Workstation performed: [id number]         Allscripts Records Reviewed: No     ** Please Note: Dragon 360 Dictation voice to text software may have been used in the creation of this document   **

## 2020-09-22 NOTE — DISCHARGE SUMMARY
Discharge Summary - [facility] Internal Medicine    Patient Information: Cristobal Suarez 60 y o  male MRN: [id number]  Unit/Bed#: ED 32 Encounter: [id number]    Discharging Physician / Practitioner: Sohail Newton MD  PCP: Prince Sofie MD  Admission Date: 9/22/2020  Discharge Date: 09/22/20    Reason for Admission:  Abdominal pain    Discharge Diagnoses:     Principal Problem:    Abdominal pain  Active Problems:    BPH associated with nocturia    Nicotine dependence    Acute back pain    Severe protein-calorie malnutrition ([address] )    History of solitary pulmonary nodule  Resolved Problems:    * No resolved hospital problems  *    Present on Admission:   Abdominal pain   Acute back pain   BPH associated with nocturia   Nicotine dependence   Severe protein-calorie malnutrition ([address] )    Consultations During Hospital Stay:  · None    Procedures Performed:     · CT abdomen/pelvis unremarkable    Significant Findings:     · As above    Incidental Findings:   · As above     Test Results Pending at Discharge (will require follow up): · None     Outpatient Tests Requested:  · None    Complications:  None    Hospital Course:     Cristobal Suarez is a 60 y o  male patient who originally presented to the hospital on 9/22/2020 due to abdominal pain  He was just recently discharged from the hospital   He had an EEG done which showed antral multiple erosions and ulcerations secondary to NSAIDs  He received IV pain medications in addition to antibiotic and IV ppi/Carafate and IV fluids  After he received IV pain medication as I saw him this morning, he basically went to sleep  He was not in any distress at that time as compared to initially when I went into his her room in the ER when he was restless  Later on, he woke up and ask for pain medications again  I believe he was given again IV pain medications with relief  I have had lengthy discussion with patient's daughter few times over the phone  Daughter/family/patient wanted to be transferred to [facility] for further evaluation and also more convenient for the family  I have spoken with Dr Simin Ruiz at [facility]  As per him, hospital is quite full and if patient is still in the hospital by tomorrow, to contact him again  This was shared with the family  Discussed with patient's daughter over the phone  Patient's daughter/family/patient decided that patient to discharged so that they could dry patient down to [facility] which is going to be close to the family in more convenient for patient to be taken care in the hospital and outside of the hospital   Patient left the hospital in stable condition  Oxygen saturation 97% on room air  Blood pressure was 152/88 respiratory rate of 16 and pulse rate of 76  He was also afebrile  His blood work also unremarkable  Condition at Discharge: fair     Discharge Day Visit / Exam:     Subjective:  Please refer to my history and physical for detailed examination etc  Vitals: Blood Pressure: 152/88 (09/22/20 1155)  Pulse: 76 (09/22/20 1155)  Temperature: 97 8 °F (36 6 °C) (09/22/20 0731)  Temp Source: Oral (09/22/20 0731)  Respirations: 16 (09/22/20 1155)  Height: 5' 9" (175 3 cm) (09/22/20 0335)  Weight - Scale: 67 4 kg (148 lb 9 4 oz) (09/22/20 0335)  SpO2: 97 % (09/22/20 1155)  Exam:        Vital signs are reviewed as above  Other examination as per history and physical            Discharge instructions/Information to patient and family:   See after visit summary for information provided to patient and family  Provisions for Follow-Up Care:  See after visit summary for information related to follow-up care and any pertinent home health orders        Disposition:     Other: With family    For Discharges to [facility] SNF:   · Not Applicable to this Patient - Not Applicable to this Patient    Planned Readmission:  None     Discharge Statement:  I spent more than 35 minutes discharging the patient in addition to  history and physical  This time was spent on the day of discharge  I had direct contact with the patient on the day of discharge  Greater than 50% of the total time was spent examining patient, answering all patient questions, arranging and discussing plan of care with patient as well as directly providing post-discharge instructions  Additional time then spent on discharge activities  Discharge Medications:  See after visit summary for reconciled discharge medications provided to patient and family  ** Please Note: Dragon 360 Dictation voice to text software may have been used in the creation of this document   **      This is same day admission/discharge

## 2020-09-24 LAB — HCV AB SER-ACNC: NONREACTIVE

## 2020-09-28 ENCOUNTER — TELEPHONE (OUTPATIENT)
Dept: INTERNAL MEDICINE CLINIC | Facility: CLINIC | Age: 65
End: 2020-09-28

## 2020-10-02 ENCOUNTER — HOSPITAL ENCOUNTER (INPATIENT)
Facility: HOSPITAL | Age: 65
LOS: 2 days | Discharge: HOME | DRG: 251 | End: 2020-10-06
Attending: EMERGENCY MEDICINE | Admitting: INTERNAL MEDICINE
Payer: COMMERCIAL

## 2020-10-02 ENCOUNTER — TELEPHONE (OUTPATIENT)
Dept: INTERNAL MEDICINE CLINIC | Facility: CLINIC | Age: 65
End: 2020-10-02

## 2020-10-02 ENCOUNTER — TELEPHONE (OUTPATIENT)
Dept: OTHER | Facility: OTHER | Age: 65
End: 2020-10-02

## 2020-10-02 ENCOUNTER — OFFICE VISIT (OUTPATIENT)
Dept: INTERNAL MEDICINE CLINIC | Facility: CLINIC | Age: 65
End: 2020-10-02
Payer: COMMERCIAL

## 2020-10-02 ENCOUNTER — APPOINTMENT (EMERGENCY)
Dept: RADIOLOGY | Facility: HOSPITAL | Age: 65
DRG: 251 | End: 2020-10-02
Payer: COMMERCIAL

## 2020-10-02 VITALS
HEART RATE: 114 BPM | TEMPERATURE: 98.6 F | WEIGHT: 127 LBS | DIASTOLIC BLOOD PRESSURE: 76 MMHG | OXYGEN SATURATION: 94 % | SYSTOLIC BLOOD PRESSURE: 122 MMHG | BODY MASS INDEX: 18.81 KG/M2 | HEIGHT: 69 IN

## 2020-10-02 DIAGNOSIS — K59.00 CONSTIPATION: ICD-10-CM

## 2020-10-02 DIAGNOSIS — I87.1 NUTCRACKER PHENOMENON OF RENAL VEIN: ICD-10-CM

## 2020-10-02 DIAGNOSIS — R10.9 ACUTE ABDOMINAL PAIN: ICD-10-CM

## 2020-10-02 DIAGNOSIS — R79.89 ELEVATED LFTS: ICD-10-CM

## 2020-10-02 DIAGNOSIS — R10.13 EPIGASTRIC PAIN: ICD-10-CM

## 2020-10-02 DIAGNOSIS — R00.0 SINUS TACHYCARDIA: ICD-10-CM

## 2020-10-02 DIAGNOSIS — K25.3 ACUTE GASTRIC ULCER WITHOUT HEMORRHAGE OR PERFORATION: ICD-10-CM

## 2020-10-02 DIAGNOSIS — K25.3 ACUTE GASTRIC ULCER WITHOUT HEMORRHAGE OR PERFORATION: Primary | ICD-10-CM

## 2020-10-02 DIAGNOSIS — R06.02 SOB (SHORTNESS OF BREATH): Primary | ICD-10-CM

## 2020-10-02 DIAGNOSIS — M54.9 ACUTE BACK PAIN: ICD-10-CM

## 2020-10-02 DIAGNOSIS — F51.01 PRIMARY INSOMNIA: Primary | ICD-10-CM

## 2020-10-02 DIAGNOSIS — M51.9 LUMBAR DISC DISEASE: ICD-10-CM

## 2020-10-02 LAB
ANION GAP SERPL CALCULATED.3IONS-SCNC: 10 MMOL/L (ref 4–13)
BASOPHILS # BLD AUTO: 0.04 THOUSANDS/ΜL (ref 0–0.1)
BASOPHILS NFR BLD AUTO: 1 % (ref 0–1)
BUN SERPL-MCNC: 14 MG/DL (ref 5–25)
CALCIUM SERPL-MCNC: 9.5 MG/DL (ref 8.3–10.1)
CHLORIDE SERPL-SCNC: 98 MMOL/L (ref 100–108)
CO2 SERPL-SCNC: 31 MMOL/L (ref 21–32)
CREAT SERPL-MCNC: 0.89 MG/DL (ref 0.6–1.3)
D DIMER PPP FEU-MCNC: 0.33 UG/ML FEU
EOSINOPHIL # BLD AUTO: 0.01 THOUSAND/ΜL (ref 0–0.61)
EOSINOPHIL NFR BLD AUTO: 0 % (ref 0–6)
ERYTHROCYTE [DISTWIDTH] IN BLOOD BY AUTOMATED COUNT: 12.8 % (ref 11.6–15.1)
GFR SERPL CREATININE-BSD FRML MDRD: 90 ML/MIN/1.73SQ M
GLUCOSE SERPL-MCNC: 114 MG/DL (ref 65–140)
HCT VFR BLD AUTO: 46.1 % (ref 36.5–49.3)
HGB BLD-MCNC: 15.2 G/DL (ref 12–17)
IMM GRANULOCYTES # BLD AUTO: 0.03 THOUSAND/UL (ref 0–0.2)
IMM GRANULOCYTES NFR BLD AUTO: 0 % (ref 0–2)
LYMPHOCYTES # BLD AUTO: 1.82 THOUSANDS/ΜL (ref 0.6–4.47)
LYMPHOCYTES NFR BLD AUTO: 21 % (ref 14–44)
MCH RBC QN AUTO: 29.2 PG (ref 26.8–34.3)
MCHC RBC AUTO-ENTMCNC: 33 G/DL (ref 31.4–37.4)
MCV RBC AUTO: 89 FL (ref 82–98)
MONOCYTES # BLD AUTO: 0.48 THOUSAND/ΜL (ref 0.17–1.22)
MONOCYTES NFR BLD AUTO: 5 % (ref 4–12)
NEUTROPHILS # BLD AUTO: 6.44 THOUSANDS/ΜL (ref 1.85–7.62)
NEUTS SEG NFR BLD AUTO: 73 % (ref 43–75)
NRBC BLD AUTO-RTO: 0 /100 WBCS
PLATELET # BLD AUTO: 291 THOUSANDS/UL (ref 149–390)
PMV BLD AUTO: 9.5 FL (ref 8.9–12.7)
POTASSIUM SERPL-SCNC: 4.3 MMOL/L (ref 3.5–5.3)
RBC # BLD AUTO: 5.2 MILLION/UL (ref 3.88–5.62)
SODIUM SERPL-SCNC: 139 MMOL/L (ref 136–145)
TROPONIN I SERPL-MCNC: <0.02 NG/ML
WBC # BLD AUTO: 8.82 THOUSAND/UL (ref 4.31–10.16)

## 2020-10-02 PROCEDURE — 80048 BASIC METABOLIC PNL TOTAL CA: CPT | Performed by: PHYSICIAN ASSISTANT

## 2020-10-02 PROCEDURE — 84484 ASSAY OF TROPONIN QUANT: CPT | Performed by: PHYSICIAN ASSISTANT

## 2020-10-02 PROCEDURE — 85379 FIBRIN DEGRADATION QUANT: CPT | Performed by: PHYSICIAN ASSISTANT

## 2020-10-02 PROCEDURE — 96361 HYDRATE IV INFUSION ADD-ON: CPT

## 2020-10-02 PROCEDURE — 85025 COMPLETE CBC W/AUTO DIFF WBC: CPT | Performed by: PHYSICIAN ASSISTANT

## 2020-10-02 PROCEDURE — 99285 EMERGENCY DEPT VISIT HI MDM: CPT | Performed by: PHYSICIAN ASSISTANT

## 2020-10-02 PROCEDURE — 93005 ELECTROCARDIOGRAM TRACING: CPT

## 2020-10-02 PROCEDURE — C9113 INJ PANTOPRAZOLE SODIUM, VIA: HCPCS | Performed by: PHYSICIAN ASSISTANT

## 2020-10-02 PROCEDURE — 99285 EMERGENCY DEPT VISIT HI MDM: CPT

## 2020-10-02 PROCEDURE — 99220 PR INITIAL OBSERVATION CARE/DAY 70 MINUTES: CPT | Performed by: INTERNAL MEDICINE

## 2020-10-02 PROCEDURE — 96374 THER/PROPH/DIAG INJ IV PUSH: CPT

## 2020-10-02 PROCEDURE — 99212 OFFICE O/P EST SF 10 MIN: CPT | Performed by: INTERNAL MEDICINE

## 2020-10-02 PROCEDURE — 71045 X-RAY EXAM CHEST 1 VIEW: CPT

## 2020-10-02 PROCEDURE — 36415 COLL VENOUS BLD VENIPUNCTURE: CPT | Performed by: PHYSICIAN ASSISTANT

## 2020-10-02 RX ORDER — PANTOPRAZOLE SODIUM 40 MG/1
40 TABLET, DELAYED RELEASE ORAL
Status: DISCONTINUED | OUTPATIENT
Start: 2020-10-03 | End: 2020-10-06 | Stop reason: HOSPADM

## 2020-10-02 RX ORDER — ACETAMINOPHEN 325 MG/1
650 TABLET ORAL EVERY 6 HOURS PRN
Status: DISCONTINUED | OUTPATIENT
Start: 2020-10-02 | End: 2020-10-06 | Stop reason: HOSPADM

## 2020-10-02 RX ORDER — SUCRALFATE 1 G/1
1 TABLET ORAL
Status: DISCONTINUED | OUTPATIENT
Start: 2020-10-02 | End: 2020-10-03

## 2020-10-02 RX ORDER — ASPIRIN 81 MG/1
81 TABLET ORAL DAILY
Status: DISCONTINUED | OUTPATIENT
Start: 2020-10-03 | End: 2020-10-06 | Stop reason: HOSPADM

## 2020-10-02 RX ORDER — NICOTINE 21 MG/24HR
1 PATCH, TRANSDERMAL 24 HOURS TRANSDERMAL DAILY
Status: DISCONTINUED | OUTPATIENT
Start: 2020-10-02 | End: 2020-10-06 | Stop reason: HOSPADM

## 2020-10-02 RX ORDER — DULOXETIN HYDROCHLORIDE 30 MG/1
30 CAPSULE, DELAYED RELEASE ORAL DAILY
COMMUNITY
Start: 2020-10-01

## 2020-10-02 RX ORDER — FINASTERIDE 5 MG/1
5 TABLET, FILM COATED ORAL DAILY
Status: DISCONTINUED | OUTPATIENT
Start: 2020-10-03 | End: 2020-10-06 | Stop reason: HOSPADM

## 2020-10-02 RX ORDER — TRAZODONE HYDROCHLORIDE 50 MG/1
50 TABLET ORAL
Qty: 30 TABLET | Refills: 1 | Status: SHIPPED | OUTPATIENT
Start: 2020-10-02 | End: 2020-10-20 | Stop reason: SINTOL

## 2020-10-02 RX ORDER — METHOCARBAMOL 500 MG/1
500 TABLET, FILM COATED ORAL EVERY 6 HOURS PRN
Status: DISCONTINUED | OUTPATIENT
Start: 2020-10-02 | End: 2020-10-06 | Stop reason: HOSPADM

## 2020-10-02 RX ORDER — CYCLOBENZAPRINE HCL 5 MG
5 TABLET ORAL
COMMUNITY
Start: 2020-09-30 | End: 2020-10-06 | Stop reason: HOSPADM

## 2020-10-02 RX ORDER — DULOXETIN HYDROCHLORIDE 30 MG/1
30 CAPSULE, DELAYED RELEASE ORAL DAILY
Status: DISCONTINUED | OUTPATIENT
Start: 2020-10-03 | End: 2020-10-06 | Stop reason: HOSPADM

## 2020-10-02 RX ORDER — SIMETHICONE 80 MG
80 TABLET,CHEWABLE ORAL ONCE
Status: COMPLETED | OUTPATIENT
Start: 2020-10-02 | End: 2020-10-02

## 2020-10-02 RX ORDER — MORPHINE SULFATE 15 MG/1
15 TABLET ORAL
COMMUNITY
Start: 2020-09-30 | End: 2020-10-02 | Stop reason: ALTCHOICE

## 2020-10-02 RX ORDER — AMOXICILLIN 250 MG
1 CAPSULE ORAL
Status: DISCONTINUED | OUTPATIENT
Start: 2020-10-02 | End: 2020-10-03

## 2020-10-02 RX ORDER — PANTOPRAZOLE SODIUM 40 MG/1
40 INJECTION, POWDER, FOR SOLUTION INTRAVENOUS ONCE
Status: COMPLETED | OUTPATIENT
Start: 2020-10-02 | End: 2020-10-02

## 2020-10-02 RX ORDER — CYCLOBENZAPRINE HCL 10 MG
5 TABLET ORAL
Status: DISCONTINUED | OUTPATIENT
Start: 2020-10-02 | End: 2020-10-06 | Stop reason: HOSPADM

## 2020-10-02 RX ORDER — GABAPENTIN 250 MG/5ML
300 SOLUTION ORAL 3 TIMES DAILY
Status: DISCONTINUED | OUTPATIENT
Start: 2020-10-02 | End: 2020-10-06 | Stop reason: HOSPADM

## 2020-10-02 RX ADMIN — LIDOCAINE HYDROCHLORIDE 10 ML: 20 SOLUTION ORAL; TOPICAL at 23:06

## 2020-10-02 RX ADMIN — SUCRALFATE 1 G: 1 TABLET ORAL at 23:36

## 2020-10-02 RX ADMIN — SIMETHICONE 80 MG: 80 TABLET, CHEWABLE ORAL at 20:53

## 2020-10-02 RX ADMIN — SODIUM CHLORIDE 1000 ML: 0.9 INJECTION, SOLUTION INTRAVENOUS at 21:04

## 2020-10-02 RX ADMIN — PANTOPRAZOLE SODIUM 40 MG: 40 INJECTION, POWDER, FOR SOLUTION INTRAVENOUS at 21:26

## 2020-10-03 ENCOUNTER — APPOINTMENT (OUTPATIENT)
Dept: CT IMAGING | Facility: HOSPITAL | Age: 65
DRG: 251 | End: 2020-10-03
Payer: COMMERCIAL

## 2020-10-03 LAB
ANION GAP SERPL CALCULATED.3IONS-SCNC: 8 MMOL/L (ref 4–13)
ATRIAL RATE: 111 BPM
ATRIAL RATE: 119 BPM
BASOPHILS # BLD AUTO: 0.06 THOUSANDS/ΜL (ref 0–0.1)
BASOPHILS NFR BLD AUTO: 1 % (ref 0–1)
BUN SERPL-MCNC: 15 MG/DL (ref 5–25)
CALCIUM SERPL-MCNC: 8.9 MG/DL (ref 8.3–10.1)
CHLORIDE SERPL-SCNC: 100 MMOL/L (ref 100–108)
CO2 SERPL-SCNC: 29 MMOL/L (ref 21–32)
CREAT SERPL-MCNC: 0.87 MG/DL (ref 0.6–1.3)
EOSINOPHIL # BLD AUTO: 0.01 THOUSAND/ΜL (ref 0–0.61)
EOSINOPHIL NFR BLD AUTO: 0 % (ref 0–6)
ERYTHROCYTE [DISTWIDTH] IN BLOOD BY AUTOMATED COUNT: 13 % (ref 11.6–15.1)
GFR SERPL CREATININE-BSD FRML MDRD: 91 ML/MIN/1.73SQ M
GLUCOSE P FAST SERPL-MCNC: 121 MG/DL (ref 65–99)
GLUCOSE SERPL-MCNC: 121 MG/DL (ref 65–140)
HCT VFR BLD AUTO: 39.8 % (ref 36.5–49.3)
HGB BLD-MCNC: 13.2 G/DL (ref 12–17)
IMM GRANULOCYTES # BLD AUTO: 0.05 THOUSAND/UL (ref 0–0.2)
IMM GRANULOCYTES NFR BLD AUTO: 1 % (ref 0–2)
LYMPHOCYTES # BLD AUTO: 2 THOUSANDS/ΜL (ref 0.6–4.47)
LYMPHOCYTES NFR BLD AUTO: 20 % (ref 14–44)
MCH RBC QN AUTO: 29.3 PG (ref 26.8–34.3)
MCHC RBC AUTO-ENTMCNC: 33.2 G/DL (ref 31.4–37.4)
MCV RBC AUTO: 88 FL (ref 82–98)
MONOCYTES # BLD AUTO: 0.63 THOUSAND/ΜL (ref 0.17–1.22)
MONOCYTES NFR BLD AUTO: 6 % (ref 4–12)
NEUTROPHILS # BLD AUTO: 7.4 THOUSANDS/ΜL (ref 1.85–7.62)
NEUTS SEG NFR BLD AUTO: 72 % (ref 43–75)
NRBC BLD AUTO-RTO: 0 /100 WBCS
P AXIS: 63 DEGREES
P AXIS: 63 DEGREES
PLATELET # BLD AUTO: 264 THOUSANDS/UL (ref 149–390)
PMV BLD AUTO: 9.6 FL (ref 8.9–12.7)
POTASSIUM SERPL-SCNC: 3.8 MMOL/L (ref 3.5–5.3)
PR INTERVAL: 148 MS
PR INTERVAL: 150 MS
QRS AXIS: 77 DEGREES
QRS AXIS: 91 DEGREES
QRSD INTERVAL: 108 MS
QRSD INTERVAL: 114 MS
QT INTERVAL: 324 MS
QT INTERVAL: 342 MS
QTC INTERVAL: 455 MS
QTC INTERVAL: 465 MS
RBC # BLD AUTO: 4.5 MILLION/UL (ref 3.88–5.62)
SODIUM SERPL-SCNC: 137 MMOL/L (ref 136–145)
T WAVE AXIS: -23 DEGREES
T WAVE AXIS: 48 DEGREES
VENTRICULAR RATE: 111 BPM
VENTRICULAR RATE: 119 BPM
WBC # BLD AUTO: 10.15 THOUSAND/UL (ref 4.31–10.16)

## 2020-10-03 PROCEDURE — 99232 SBSQ HOSP IP/OBS MODERATE 35: CPT | Performed by: INTERNAL MEDICINE

## 2020-10-03 PROCEDURE — 93005 ELECTROCARDIOGRAM TRACING: CPT

## 2020-10-03 PROCEDURE — G1004 CDSM NDSC: HCPCS

## 2020-10-03 PROCEDURE — 74174 CTA ABD&PLVS W/CONTRAST: CPT

## 2020-10-03 PROCEDURE — 99222 1ST HOSP IP/OBS MODERATE 55: CPT | Performed by: INTERNAL MEDICINE

## 2020-10-03 PROCEDURE — 93010 ELECTROCARDIOGRAM REPORT: CPT | Performed by: INTERNAL MEDICINE

## 2020-10-03 PROCEDURE — 80048 BASIC METABOLIC PNL TOTAL CA: CPT | Performed by: INTERNAL MEDICINE

## 2020-10-03 PROCEDURE — 85025 COMPLETE CBC W/AUTO DIFF WBC: CPT | Performed by: INTERNAL MEDICINE

## 2020-10-03 RX ORDER — DIPHENHYDRAMINE HYDROCHLORIDE 50 MG/ML
25 INJECTION INTRAMUSCULAR; INTRAVENOUS EVERY 6 HOURS PRN
Status: DISCONTINUED | OUTPATIENT
Start: 2020-10-03 | End: 2020-10-03

## 2020-10-03 RX ORDER — SUCRALFATE 1 G/1
1 TABLET ORAL
Status: DISCONTINUED | OUTPATIENT
Start: 2020-10-03 | End: 2020-10-06 | Stop reason: HOSPADM

## 2020-10-03 RX ORDER — BISACODYL 10 MG
10 SUPPOSITORY, RECTAL RECTAL DAILY
Status: DISCONTINUED | OUTPATIENT
Start: 2020-10-03 | End: 2020-10-04

## 2020-10-03 RX ORDER — ONDANSETRON 2 MG/ML
4 INJECTION INTRAMUSCULAR; INTRAVENOUS EVERY 6 HOURS PRN
Status: DISCONTINUED | OUTPATIENT
Start: 2020-10-03 | End: 2020-10-06 | Stop reason: HOSPADM

## 2020-10-03 RX ORDER — DICYCLOMINE HYDROCHLORIDE 10 MG/1
20 CAPSULE ORAL
Status: DISCONTINUED | OUTPATIENT
Start: 2020-10-03 | End: 2020-10-06 | Stop reason: HOSPADM

## 2020-10-03 RX ORDER — POLYETHYLENE GLYCOL 3350 17 G/17G
17 POWDER, FOR SOLUTION ORAL 2 TIMES DAILY
Status: DISCONTINUED | OUTPATIENT
Start: 2020-10-03 | End: 2020-10-06 | Stop reason: HOSPADM

## 2020-10-03 RX ORDER — DICYCLOMINE HYDROCHLORIDE 10 MG/1
10 CAPSULE ORAL
Status: DISCONTINUED | OUTPATIENT
Start: 2020-10-03 | End: 2020-10-03

## 2020-10-03 RX ORDER — MAGNESIUM HYDROXIDE/ALUMINUM HYDROXICE/SIMETHICONE 120; 1200; 1200 MG/30ML; MG/30ML; MG/30ML
30 SUSPENSION ORAL EVERY 4 HOURS PRN
Status: DISCONTINUED | OUTPATIENT
Start: 2020-10-03 | End: 2020-10-06 | Stop reason: HOSPADM

## 2020-10-03 RX ORDER — DIPHENHYDRAMINE HYDROCHLORIDE 50 MG/ML
25 INJECTION INTRAMUSCULAR; INTRAVENOUS EVERY 6 HOURS PRN
Status: DISCONTINUED | OUTPATIENT
Start: 2020-10-03 | End: 2020-10-06 | Stop reason: HOSPADM

## 2020-10-03 RX ORDER — SUCRALFATE ORAL 1 G/10ML
1000 SUSPENSION ORAL
Status: DISCONTINUED | OUTPATIENT
Start: 2020-10-03 | End: 2020-10-03

## 2020-10-03 RX ADMIN — MORPHINE SULFATE 2 MG: 2 INJECTION, SOLUTION INTRAMUSCULAR; INTRAVENOUS at 20:05

## 2020-10-03 RX ADMIN — DIPHENHYDRAMINE HYDROCHLORIDE 25 MG: 50 INJECTION, SOLUTION INTRAMUSCULAR; INTRAVENOUS at 20:04

## 2020-10-03 RX ADMIN — ONDANSETRON 4 MG: 2 INJECTION INTRAMUSCULAR; INTRAVENOUS at 01:17

## 2020-10-03 RX ADMIN — BISACODYL 10 MG: 10 SUPPOSITORY RECTAL at 13:29

## 2020-10-03 RX ADMIN — SUCRALFATE 1 G: 1 TABLET ORAL at 06:05

## 2020-10-03 RX ADMIN — PANTOPRAZOLE SODIUM 40 MG: 40 TABLET, DELAYED RELEASE ORAL at 06:05

## 2020-10-03 RX ADMIN — DICYCLOMINE HYDROCHLORIDE 20 MG: 10 CAPSULE ORAL at 17:36

## 2020-10-03 RX ADMIN — IOHEXOL 100 ML: 350 INJECTION, SOLUTION INTRAVENOUS at 12:07

## 2020-10-03 RX ADMIN — SUCRALFATE 1 G: 1 TABLET ORAL at 21:41

## 2020-10-03 RX ADMIN — POLYETHYLENE GLYCOL 3350 17 G: 17 POWDER, FOR SOLUTION ORAL at 17:36

## 2020-10-03 RX ADMIN — MORPHINE SULFATE 2 MG: 2 INJECTION, SOLUTION INTRAMUSCULAR; INTRAVENOUS at 09:07

## 2020-10-03 RX ADMIN — ALUMINUM HYDROXIDE, MAGNESIUM HYDROXIDE, AND SIMETHICONE 30 ML: 200; 200; 20 SUSPENSION ORAL at 01:14

## 2020-10-03 RX ADMIN — PANTOPRAZOLE SODIUM 40 MG: 40 TABLET, DELAYED RELEASE ORAL at 17:36

## 2020-10-03 RX ADMIN — METHOCARBAMOL TABLETS 500 MG: 500 TABLET, COATED ORAL at 20:04

## 2020-10-03 RX ADMIN — GABAPENTIN 300 MG: 250 SOLUTION ORAL at 17:36

## 2020-10-03 RX ADMIN — MORPHINE SULFATE 2 MG: 2 INJECTION, SOLUTION INTRAMUSCULAR; INTRAVENOUS at 13:25

## 2020-10-04 LAB
ALBUMIN SERPL BCP-MCNC: 3.3 G/DL (ref 3.5–5)
ALP SERPL-CCNC: 87 U/L (ref 46–116)
ALT SERPL W P-5'-P-CCNC: 381 U/L (ref 12–78)
AMYLASE SERPL-CCNC: 52 IU/L (ref 25–115)
ANION GAP SERPL CALCULATED.3IONS-SCNC: 12 MMOL/L (ref 4–13)
AST SERPL W P-5'-P-CCNC: 103 U/L (ref 5–45)
BASOPHILS # BLD AUTO: 0.07 THOUSANDS/ΜL (ref 0–0.1)
BASOPHILS NFR BLD AUTO: 1 % (ref 0–1)
BILIRUB SERPL-MCNC: 0.6 MG/DL (ref 0.2–1)
BILIRUB UR QL STRIP: NEGATIVE
BUN SERPL-MCNC: 16 MG/DL (ref 5–25)
CALCIUM ALBUM COR SERPL-MCNC: 9.9 MG/DL (ref 8.3–10.1)
CALCIUM SERPL-MCNC: 9.3 MG/DL (ref 8.3–10.1)
CHLORIDE SERPL-SCNC: 100 MMOL/L (ref 100–108)
CLARITY UR: CLEAR
CO2 SERPL-SCNC: 27 MMOL/L (ref 21–32)
COLOR UR: YELLOW
CREAT SERPL-MCNC: 0.84 MG/DL (ref 0.6–1.3)
EOSINOPHIL # BLD AUTO: 0.04 THOUSAND/ΜL (ref 0–0.61)
EOSINOPHIL NFR BLD AUTO: 0 % (ref 0–6)
ERYTHROCYTE [DISTWIDTH] IN BLOOD BY AUTOMATED COUNT: 13 % (ref 11.6–15.1)
GFR SERPL CREATININE-BSD FRML MDRD: 93 ML/MIN/1.73SQ M
GLUCOSE P FAST SERPL-MCNC: 113 MG/DL (ref 65–99)
GLUCOSE SERPL-MCNC: 113 MG/DL (ref 65–140)
GLUCOSE UR STRIP-MCNC: NEGATIVE MG/DL
HCT VFR BLD AUTO: 41.1 % (ref 36.5–49.3)
HGB BLD-MCNC: 13.5 G/DL (ref 12–17)
HGB UR QL STRIP.AUTO: NEGATIVE
IMM GRANULOCYTES # BLD AUTO: 0.06 THOUSAND/UL (ref 0–0.2)
IMM GRANULOCYTES NFR BLD AUTO: 1 % (ref 0–2)
KETONES UR STRIP-MCNC: NEGATIVE MG/DL
LACTATE SERPL-SCNC: 1 MMOL/L (ref 0.5–2)
LEUKOCYTE ESTERASE UR QL STRIP: NEGATIVE
LIPASE SERPL-CCNC: 103 U/L (ref 73–393)
LYMPHOCYTES # BLD AUTO: 2.75 THOUSANDS/ΜL (ref 0.6–4.47)
LYMPHOCYTES NFR BLD AUTO: 26 % (ref 14–44)
MAGNESIUM SERPL-MCNC: 2.2 MG/DL (ref 1.6–2.6)
MCH RBC QN AUTO: 29.2 PG (ref 26.8–34.3)
MCHC RBC AUTO-ENTMCNC: 32.8 G/DL (ref 31.4–37.4)
MCV RBC AUTO: 89 FL (ref 82–98)
MONOCYTES # BLD AUTO: 0.69 THOUSAND/ΜL (ref 0.17–1.22)
MONOCYTES NFR BLD AUTO: 7 % (ref 4–12)
NEUTROPHILS # BLD AUTO: 7.07 THOUSANDS/ΜL (ref 1.85–7.62)
NEUTS SEG NFR BLD AUTO: 65 % (ref 43–75)
NITRITE UR QL STRIP: NEGATIVE
NRBC BLD AUTO-RTO: 0 /100 WBCS
PH UR STRIP.AUTO: 6 [PH]
PHOSPHATE SERPL-MCNC: 4 MG/DL (ref 2.3–4.1)
PLATELET # BLD AUTO: 295 THOUSANDS/UL (ref 149–390)
PMV BLD AUTO: 9.7 FL (ref 8.9–12.7)
POTASSIUM SERPL-SCNC: 3.6 MMOL/L (ref 3.5–5.3)
PROT SERPL-MCNC: 7.3 G/DL (ref 6.4–8.2)
PROT UR STRIP-MCNC: NEGATIVE MG/DL
RBC # BLD AUTO: 4.63 MILLION/UL (ref 3.88–5.62)
SODIUM SERPL-SCNC: 139 MMOL/L (ref 136–145)
SP GR UR STRIP.AUTO: 1.01 (ref 1–1.03)
UROBILINOGEN UR QL STRIP.AUTO: 0.2 E.U./DL
WBC # BLD AUTO: 10.68 THOUSAND/UL (ref 4.31–10.16)

## 2020-10-04 PROCEDURE — 83735 ASSAY OF MAGNESIUM: CPT | Performed by: INTERNAL MEDICINE

## 2020-10-04 PROCEDURE — 99232 SBSQ HOSP IP/OBS MODERATE 35: CPT | Performed by: INTERNAL MEDICINE

## 2020-10-04 PROCEDURE — 83690 ASSAY OF LIPASE: CPT | Performed by: INTERNAL MEDICINE

## 2020-10-04 PROCEDURE — 84100 ASSAY OF PHOSPHORUS: CPT | Performed by: INTERNAL MEDICINE

## 2020-10-04 PROCEDURE — 82150 ASSAY OF AMYLASE: CPT | Performed by: INTERNAL MEDICINE

## 2020-10-04 PROCEDURE — 84120 ASSAY OF URINE PORPHYRINS: CPT | Performed by: INTERNAL MEDICINE

## 2020-10-04 PROCEDURE — 83605 ASSAY OF LACTIC ACID: CPT | Performed by: INTERNAL MEDICINE

## 2020-10-04 PROCEDURE — 80053 COMPREHEN METABOLIC PANEL: CPT | Performed by: INTERNAL MEDICINE

## 2020-10-04 PROCEDURE — 0DJD8ZZ INSPECTION OF LOWER INTESTINAL TRACT, VIA NATURAL OR ARTIFICIAL OPENING ENDOSCOPIC: ICD-10-PCS | Performed by: INTERNAL MEDICINE

## 2020-10-04 PROCEDURE — 85025 COMPLETE CBC W/AUTO DIFF WBC: CPT | Performed by: INTERNAL MEDICINE

## 2020-10-04 PROCEDURE — 81003 URINALYSIS AUTO W/O SCOPE: CPT | Performed by: PHYSICIAN ASSISTANT

## 2020-10-04 RX ORDER — POLYETHYLENE GLYCOL 3350 17 G/17G
238 POWDER, FOR SOLUTION ORAL ONCE
Status: COMPLETED | OUTPATIENT
Start: 2020-10-04 | End: 2020-10-04

## 2020-10-04 RX ORDER — BISACODYL 10 MG
10 SUPPOSITORY, RECTAL RECTAL 2 TIMES DAILY PRN
Status: DISCONTINUED | OUTPATIENT
Start: 2020-10-04 | End: 2020-10-06 | Stop reason: HOSPADM

## 2020-10-04 RX ADMIN — FINASTERIDE 5 MG: 5 TABLET, FILM COATED ORAL at 08:27

## 2020-10-04 RX ADMIN — POLYETHYLENE GLYCOL 3350 17 G: 17 POWDER, FOR SOLUTION ORAL at 08:28

## 2020-10-04 RX ADMIN — DULOXETINE HYDROCHLORIDE 30 MG: 30 CAPSULE, DELAYED RELEASE ORAL at 08:27

## 2020-10-04 RX ADMIN — MORPHINE SULFATE 2 MG: 2 INJECTION, SOLUTION INTRAMUSCULAR; INTRAVENOUS at 04:18

## 2020-10-04 RX ADMIN — DICYCLOMINE HYDROCHLORIDE 20 MG: 10 CAPSULE ORAL at 06:39

## 2020-10-04 RX ADMIN — SUCRALFATE 1 G: 1 TABLET ORAL at 21:36

## 2020-10-04 RX ADMIN — MORPHINE SULFATE 2 MG: 2 INJECTION, SOLUTION INTRAMUSCULAR; INTRAVENOUS at 21:37

## 2020-10-04 RX ADMIN — SUCRALFATE 1 G: 1 TABLET ORAL at 11:50

## 2020-10-04 RX ADMIN — DIPHENHYDRAMINE HYDROCHLORIDE 25 MG: 50 INJECTION, SOLUTION INTRAMUSCULAR; INTRAVENOUS at 01:32

## 2020-10-04 RX ADMIN — SUCRALFATE 1 G: 1 TABLET ORAL at 06:39

## 2020-10-04 RX ADMIN — MORPHINE SULFATE 2 MG: 2 INJECTION, SOLUTION INTRAMUSCULAR; INTRAVENOUS at 00:11

## 2020-10-04 RX ADMIN — PANTOPRAZOLE SODIUM 40 MG: 40 TABLET, DELAYED RELEASE ORAL at 05:38

## 2020-10-04 RX ADMIN — SUCRALFATE 1 G: 1 TABLET ORAL at 16:57

## 2020-10-04 RX ADMIN — DICYCLOMINE HYDROCHLORIDE 20 MG: 10 CAPSULE ORAL at 11:51

## 2020-10-04 RX ADMIN — POLYETHYLENE GLYCOL 3350 238 G: 17 POWDER, FOR SOLUTION ORAL at 17:46

## 2020-10-04 RX ADMIN — PANTOPRAZOLE SODIUM 40 MG: 40 TABLET, DELAYED RELEASE ORAL at 16:57

## 2020-10-04 RX ADMIN — DICYCLOMINE HYDROCHLORIDE 20 MG: 10 CAPSULE ORAL at 16:57

## 2020-10-04 RX ADMIN — MORPHINE SULFATE 2 MG: 2 INJECTION, SOLUTION INTRAMUSCULAR; INTRAVENOUS at 15:08

## 2020-10-04 RX ADMIN — ALUMINUM HYDROXIDE, MAGNESIUM HYDROXIDE, AND SIMETHICONE 30 ML: 200; 200; 20 SUSPENSION ORAL at 03:15

## 2020-10-04 RX ADMIN — MORPHINE SULFATE 2 MG: 2 INJECTION, SOLUTION INTRAMUSCULAR; INTRAVENOUS at 10:00

## 2020-10-04 RX ADMIN — BISACODYL 10 MG: 10 SUPPOSITORY RECTAL at 08:27

## 2020-10-04 RX ADMIN — DIPHENHYDRAMINE HYDROCHLORIDE 25 MG: 50 INJECTION, SOLUTION INTRAMUSCULAR; INTRAVENOUS at 11:50

## 2020-10-05 ENCOUNTER — ANESTHESIA (INPATIENT)
Dept: GASTROENTEROLOGY | Facility: HOSPITAL | Age: 65
DRG: 251 | End: 2020-10-05
Payer: COMMERCIAL

## 2020-10-05 ENCOUNTER — ANESTHESIA EVENT (INPATIENT)
Dept: GASTROENTEROLOGY | Facility: HOSPITAL | Age: 65
DRG: 251 | End: 2020-10-05
Payer: COMMERCIAL

## 2020-10-05 ENCOUNTER — APPOINTMENT (INPATIENT)
Dept: GASTROENTEROLOGY | Facility: HOSPITAL | Age: 65
DRG: 251 | End: 2020-10-05
Attending: INTERNAL MEDICINE
Payer: COMMERCIAL

## 2020-10-05 VITALS
OXYGEN SATURATION: 97 % | HEIGHT: 69 IN | DIASTOLIC BLOOD PRESSURE: 77 MMHG | HEART RATE: 91 BPM | RESPIRATION RATE: 22 BRPM | SYSTOLIC BLOOD PRESSURE: 111 MMHG | TEMPERATURE: 98.1 F | WEIGHT: 126.98 LBS | BODY MASS INDEX: 18.81 KG/M2

## 2020-10-05 VITALS — HEART RATE: 94 BPM

## 2020-10-05 LAB
ALBUMIN SERPL BCP-MCNC: 3.2 G/DL (ref 3.5–5)
ALP SERPL-CCNC: 83 U/L (ref 46–116)
ALT SERPL W P-5'-P-CCNC: 263 U/L (ref 12–78)
ANION GAP SERPL CALCULATED.3IONS-SCNC: 9 MMOL/L (ref 4–13)
AST SERPL W P-5'-P-CCNC: 41 U/L (ref 5–45)
BASOPHILS # BLD AUTO: 0.08 THOUSANDS/ΜL (ref 0–0.1)
BASOPHILS NFR BLD AUTO: 1 % (ref 0–1)
BILIRUB SERPL-MCNC: 0.6 MG/DL (ref 0.2–1)
BUN SERPL-MCNC: 11 MG/DL (ref 5–25)
CALCIUM ALBUM COR SERPL-MCNC: 9.7 MG/DL (ref 8.3–10.1)
CALCIUM SERPL-MCNC: 9.1 MG/DL (ref 8.3–10.1)
CHLORIDE SERPL-SCNC: 99 MMOL/L (ref 100–108)
CO2 SERPL-SCNC: 30 MMOL/L (ref 21–32)
CREAT SERPL-MCNC: 0.83 MG/DL (ref 0.6–1.3)
EOSINOPHIL # BLD AUTO: 0.04 THOUSAND/ΜL (ref 0–0.61)
EOSINOPHIL NFR BLD AUTO: 1 % (ref 0–6)
ERYTHROCYTE [DISTWIDTH] IN BLOOD BY AUTOMATED COUNT: 12.9 % (ref 11.6–15.1)
GFR SERPL CREATININE-BSD FRML MDRD: 93 ML/MIN/1.73SQ M
GLUCOSE SERPL-MCNC: 119 MG/DL (ref 65–140)
HCT VFR BLD AUTO: 40.6 % (ref 36.5–49.3)
HGB BLD-MCNC: 13.6 G/DL (ref 12–17)
IMM GRANULOCYTES # BLD AUTO: 0.03 THOUSAND/UL (ref 0–0.2)
IMM GRANULOCYTES NFR BLD AUTO: 0 % (ref 0–2)
LYMPHOCYTES # BLD AUTO: 2.08 THOUSANDS/ΜL (ref 0.6–4.47)
LYMPHOCYTES NFR BLD AUTO: 24 % (ref 14–44)
MCH RBC QN AUTO: 29.7 PG (ref 26.8–34.3)
MCHC RBC AUTO-ENTMCNC: 33.5 G/DL (ref 31.4–37.4)
MCV RBC AUTO: 89 FL (ref 82–98)
MONOCYTES # BLD AUTO: 0.62 THOUSAND/ΜL (ref 0.17–1.22)
MONOCYTES NFR BLD AUTO: 7 % (ref 4–12)
NEUTROPHILS # BLD AUTO: 5.66 THOUSANDS/ΜL (ref 1.85–7.62)
NEUTS SEG NFR BLD AUTO: 67 % (ref 43–75)
NRBC BLD AUTO-RTO: 0 /100 WBCS
PLATELET # BLD AUTO: 284 THOUSANDS/UL (ref 149–390)
PMV BLD AUTO: 9.3 FL (ref 8.9–12.7)
POTASSIUM SERPL-SCNC: 3.4 MMOL/L (ref 3.5–5.3)
PROT SERPL-MCNC: 7.2 G/DL (ref 6.4–8.2)
RBC # BLD AUTO: 4.58 MILLION/UL (ref 3.88–5.62)
SODIUM SERPL-SCNC: 138 MMOL/L (ref 136–145)
WBC # BLD AUTO: 8.51 THOUSAND/UL (ref 4.31–10.16)

## 2020-10-05 PROCEDURE — 80053 COMPREHEN METABOLIC PANEL: CPT | Performed by: INTERNAL MEDICINE

## 2020-10-05 PROCEDURE — 85025 COMPLETE CBC W/AUTO DIFF WBC: CPT | Performed by: INTERNAL MEDICINE

## 2020-10-05 PROCEDURE — 45378 DIAGNOSTIC COLONOSCOPY: CPT | Performed by: INTERNAL MEDICINE

## 2020-10-05 PROCEDURE — 99232 SBSQ HOSP IP/OBS MODERATE 35: CPT | Performed by: INTERNAL MEDICINE

## 2020-10-05 RX ORDER — LIDOCAINE HYDROCHLORIDE 10 MG/ML
INJECTION, SOLUTION EPIDURAL; INFILTRATION; INTRACAUDAL; PERINEURAL AS NEEDED
Status: DISCONTINUED | OUTPATIENT
Start: 2020-10-05 | End: 2020-10-05

## 2020-10-05 RX ORDER — PROPOFOL 10 MG/ML
INJECTION, EMULSION INTRAVENOUS AS NEEDED
Status: DISCONTINUED | OUTPATIENT
Start: 2020-10-05 | End: 2020-10-05

## 2020-10-05 RX ORDER — SODIUM CHLORIDE, SODIUM LACTATE, POTASSIUM CHLORIDE, CALCIUM CHLORIDE 600; 310; 30; 20 MG/100ML; MG/100ML; MG/100ML; MG/100ML
INJECTION, SOLUTION INTRAVENOUS CONTINUOUS PRN
Status: DISCONTINUED | OUTPATIENT
Start: 2020-10-05 | End: 2020-10-05

## 2020-10-05 RX ORDER — POTASSIUM CHLORIDE 20 MEQ/1
40 TABLET, EXTENDED RELEASE ORAL ONCE
Status: COMPLETED | OUTPATIENT
Start: 2020-10-05 | End: 2020-10-05

## 2020-10-05 RX ADMIN — PROPOFOL 20 MG: 10 INJECTION, EMULSION INTRAVENOUS at 13:41

## 2020-10-05 RX ADMIN — SUCRALFATE 1 G: 1 TABLET ORAL at 06:02

## 2020-10-05 RX ADMIN — MORPHINE SULFATE 2 MG: 2 INJECTION, SOLUTION INTRAMUSCULAR; INTRAVENOUS at 10:08

## 2020-10-05 RX ADMIN — MORPHINE SULFATE 2 MG: 2 INJECTION, SOLUTION INTRAMUSCULAR; INTRAVENOUS at 23:07

## 2020-10-05 RX ADMIN — PANTOPRAZOLE SODIUM 40 MG: 40 TABLET, DELAYED RELEASE ORAL at 17:03

## 2020-10-05 RX ADMIN — POTASSIUM CHLORIDE 40 MEQ: 1500 TABLET, EXTENDED RELEASE ORAL at 09:01

## 2020-10-05 RX ADMIN — SUCRALFATE 1 G: 1 TABLET ORAL at 16:56

## 2020-10-05 RX ADMIN — MORPHINE SULFATE 2 MG: 2 INJECTION, SOLUTION INTRAMUSCULAR; INTRAVENOUS at 19:07

## 2020-10-05 RX ADMIN — CYCLOBENZAPRINE HYDROCHLORIDE 5 MG: 10 TABLET, FILM COATED ORAL at 22:58

## 2020-10-05 RX ADMIN — PROPOFOL 20 MG: 10 INJECTION, EMULSION INTRAVENOUS at 13:35

## 2020-10-05 RX ADMIN — DICYCLOMINE HYDROCHLORIDE 20 MG: 10 CAPSULE ORAL at 06:00

## 2020-10-05 RX ADMIN — MORPHINE SULFATE 2 MG: 2 INJECTION, SOLUTION INTRAMUSCULAR; INTRAVENOUS at 14:54

## 2020-10-05 RX ADMIN — DULOXETINE HYDROCHLORIDE 30 MG: 30 CAPSULE, DELAYED RELEASE ORAL at 09:01

## 2020-10-05 RX ADMIN — MORPHINE SULFATE 2 MG: 2 INJECTION, SOLUTION INTRAMUSCULAR; INTRAVENOUS at 01:36

## 2020-10-05 RX ADMIN — PROPOFOL 50 MG: 10 INJECTION, EMULSION INTRAVENOUS at 13:32

## 2020-10-05 RX ADMIN — GABAPENTIN 300 MG: 250 SOLUTION ORAL at 17:49

## 2020-10-05 RX ADMIN — SUCRALFATE 1 G: 1 TABLET ORAL at 14:49

## 2020-10-05 RX ADMIN — PANTOPRAZOLE SODIUM 40 MG: 40 TABLET, DELAYED RELEASE ORAL at 05:56

## 2020-10-05 RX ADMIN — PROPOFOL 50 MG: 10 INJECTION, EMULSION INTRAVENOUS at 13:31

## 2020-10-05 RX ADMIN — SODIUM CHLORIDE, SODIUM LACTATE, POTASSIUM CHLORIDE, AND CALCIUM CHLORIDE: .6; .31; .03; .02 INJECTION, SOLUTION INTRAVENOUS at 13:25

## 2020-10-05 RX ADMIN — MORPHINE SULFATE 2 MG: 2 INJECTION, SOLUTION INTRAMUSCULAR; INTRAVENOUS at 05:56

## 2020-10-05 RX ADMIN — SUCRALFATE 1 G: 1 TABLET ORAL at 22:58

## 2020-10-05 RX ADMIN — POLYETHYLENE GLYCOL 3350 17 G: 17 POWDER, FOR SOLUTION ORAL at 22:57

## 2020-10-05 RX ADMIN — DIPHENHYDRAMINE HYDROCHLORIDE 25 MG: 50 INJECTION, SOLUTION INTRAMUSCULAR; INTRAVENOUS at 01:38

## 2020-10-05 RX ADMIN — GABAPENTIN 300 MG: 250 SOLUTION ORAL at 22:57

## 2020-10-05 RX ADMIN — FINASTERIDE 5 MG: 5 TABLET, FILM COATED ORAL at 09:01

## 2020-10-05 RX ADMIN — GABAPENTIN 300 MG: 250 SOLUTION ORAL at 09:03

## 2020-10-05 RX ADMIN — PROPOFOL 30 MG: 10 INJECTION, EMULSION INTRAVENOUS at 13:39

## 2020-10-05 RX ADMIN — DICYCLOMINE HYDROCHLORIDE 20 MG: 10 CAPSULE ORAL at 14:48

## 2020-10-05 RX ADMIN — DIPHENHYDRAMINE HYDROCHLORIDE 25 MG: 50 INJECTION, SOLUTION INTRAMUSCULAR; INTRAVENOUS at 23:14

## 2020-10-05 RX ADMIN — LIDOCAINE HYDROCHLORIDE 50 MG: 10 INJECTION, SOLUTION EPIDURAL; INFILTRATION; INTRACAUDAL; PERINEURAL at 13:31

## 2020-10-06 DIAGNOSIS — Z78.9 NEED FOR FOLLOW-UP BY SOCIAL WORKER: Primary | ICD-10-CM

## 2020-10-06 LAB
ALBUMIN SERPL BCP-MCNC: 2.9 G/DL (ref 3.5–5)
ALP SERPL-CCNC: 79 U/L (ref 46–116)
ALT SERPL W P-5'-P-CCNC: 184 U/L (ref 12–78)
ANION GAP SERPL CALCULATED.3IONS-SCNC: 9 MMOL/L (ref 4–13)
AST SERPL W P-5'-P-CCNC: 26 U/L (ref 5–45)
BASOPHILS # BLD AUTO: 0.08 THOUSANDS/ΜL (ref 0–0.1)
BASOPHILS NFR BLD AUTO: 1 % (ref 0–1)
BILIRUB SERPL-MCNC: 0.5 MG/DL (ref 0.2–1)
BUN SERPL-MCNC: 12 MG/DL (ref 5–25)
CALCIUM ALBUM COR SERPL-MCNC: 9.7 MG/DL (ref 8.3–10.1)
CALCIUM SERPL-MCNC: 8.8 MG/DL (ref 8.3–10.1)
CHLORIDE SERPL-SCNC: 101 MMOL/L (ref 100–108)
CO2 SERPL-SCNC: 29 MMOL/L (ref 21–32)
CREAT SERPL-MCNC: 0.82 MG/DL (ref 0.6–1.3)
EOSINOPHIL # BLD AUTO: 0.09 THOUSAND/ΜL (ref 0–0.61)
EOSINOPHIL NFR BLD AUTO: 1 % (ref 0–6)
ERYTHROCYTE [DISTWIDTH] IN BLOOD BY AUTOMATED COUNT: 12.9 % (ref 11.6–15.1)
GFR SERPL CREATININE-BSD FRML MDRD: 93 ML/MIN/1.73SQ M
GLUCOSE SERPL-MCNC: 103 MG/DL (ref 65–140)
HCT VFR BLD AUTO: 36.4 % (ref 36.5–49.3)
HGB BLD-MCNC: 12.1 G/DL (ref 12–17)
IMM GRANULOCYTES # BLD AUTO: 0.03 THOUSAND/UL (ref 0–0.2)
IMM GRANULOCYTES NFR BLD AUTO: 0 % (ref 0–2)
LYMPHOCYTES # BLD AUTO: 1.91 THOUSANDS/ΜL (ref 0.6–4.47)
LYMPHOCYTES NFR BLD AUTO: 28 % (ref 14–44)
MCH RBC QN AUTO: 29.4 PG (ref 26.8–34.3)
MCHC RBC AUTO-ENTMCNC: 33.2 G/DL (ref 31.4–37.4)
MCV RBC AUTO: 89 FL (ref 82–98)
MONOCYTES # BLD AUTO: 0.6 THOUSAND/ΜL (ref 0.17–1.22)
MONOCYTES NFR BLD AUTO: 9 % (ref 4–12)
NEUTROPHILS # BLD AUTO: 4.12 THOUSANDS/ΜL (ref 1.85–7.62)
NEUTS SEG NFR BLD AUTO: 61 % (ref 43–75)
NRBC BLD AUTO-RTO: 0 /100 WBCS
PLATELET # BLD AUTO: 256 THOUSANDS/UL (ref 149–390)
PMV BLD AUTO: 9.7 FL (ref 8.9–12.7)
POTASSIUM SERPL-SCNC: 3.4 MMOL/L (ref 3.5–5.3)
PROT SERPL-MCNC: 6.6 G/DL (ref 6.4–8.2)
RBC # BLD AUTO: 4.11 MILLION/UL (ref 3.88–5.62)
SODIUM SERPL-SCNC: 139 MMOL/L (ref 136–145)
WBC # BLD AUTO: 6.83 THOUSAND/UL (ref 4.31–10.16)

## 2020-10-06 PROCEDURE — 80053 COMPREHEN METABOLIC PANEL: CPT | Performed by: INTERNAL MEDICINE

## 2020-10-06 PROCEDURE — 85025 COMPLETE CBC W/AUTO DIFF WBC: CPT | Performed by: INTERNAL MEDICINE

## 2020-10-06 PROCEDURE — 99239 HOSP IP/OBS DSCHRG MGMT >30: CPT | Performed by: FAMILY MEDICINE

## 2020-10-06 RX ORDER — DICYCLOMINE HYDROCHLORIDE 10 MG/1
20 CAPSULE ORAL
Qty: 84 CAPSULE | Refills: 0 | Status: SHIPPED | OUTPATIENT
Start: 2020-10-06 | End: 2021-03-16 | Stop reason: ALTCHOICE

## 2020-10-06 RX ORDER — POLYETHYLENE GLYCOL 3350 17 G/17G
17 POWDER, FOR SOLUTION ORAL 2 TIMES DAILY
Qty: 476 G | Refills: 0 | Status: SHIPPED | OUTPATIENT
Start: 2020-10-06 | End: 2020-10-20

## 2020-10-06 RX ORDER — SUCRALFATE 1 G/1
1 TABLET ORAL
Qty: 120 TABLET | Refills: 0 | Status: SHIPPED | OUTPATIENT
Start: 2020-10-06 | End: 2020-12-10 | Stop reason: SDUPTHER

## 2020-10-06 RX ORDER — CYCLOBENZAPRINE HCL 5 MG
5 TABLET ORAL
Qty: 10 TABLET | Refills: 0 | Status: SHIPPED | OUTPATIENT
Start: 2020-10-06 | End: 2021-03-16 | Stop reason: ALTCHOICE

## 2020-10-06 RX ORDER — PANTOPRAZOLE SODIUM 40 MG/1
40 TABLET, DELAYED RELEASE ORAL
Qty: 60 TABLET | Refills: 0 | Status: SHIPPED | OUTPATIENT
Start: 2020-10-06 | End: 2020-11-10

## 2020-10-06 RX ORDER — MAGNESIUM HYDROXIDE/ALUMINUM HYDROXICE/SIMETHICONE 120; 1200; 1200 MG/30ML; MG/30ML; MG/30ML
30 SUSPENSION ORAL EVERY 4 HOURS PRN
Qty: 355 ML | Refills: 0 | Status: SHIPPED | OUTPATIENT
Start: 2020-10-06 | End: 2021-03-16 | Stop reason: ALTCHOICE

## 2020-10-06 RX ADMIN — BISACODYL 10 MG: 10 SUPPOSITORY RECTAL at 10:09

## 2020-10-06 RX ADMIN — POLYETHYLENE GLYCOL 3350 17 G: 17 POWDER, FOR SOLUTION ORAL at 08:29

## 2020-10-06 RX ADMIN — SUCRALFATE 1 G: 1 TABLET ORAL at 06:27

## 2020-10-06 RX ADMIN — GABAPENTIN 300 MG: 250 SOLUTION ORAL at 08:46

## 2020-10-06 RX ADMIN — DICYCLOMINE HYDROCHLORIDE 20 MG: 10 CAPSULE ORAL at 12:33

## 2020-10-06 RX ADMIN — FINASTERIDE 5 MG: 5 TABLET, FILM COATED ORAL at 08:29

## 2020-10-06 RX ADMIN — SUCRALFATE 1 G: 1 TABLET ORAL at 12:33

## 2020-10-06 RX ADMIN — ALUMINUM HYDROXIDE, MAGNESIUM HYDROXIDE, AND SIMETHICONE 30 ML: 200; 200; 20 SUSPENSION ORAL at 01:52

## 2020-10-06 RX ADMIN — MORPHINE SULFATE 2 MG: 2 INJECTION, SOLUTION INTRAMUSCULAR; INTRAVENOUS at 01:45

## 2020-10-06 RX ADMIN — PANTOPRAZOLE SODIUM 40 MG: 40 TABLET, DELAYED RELEASE ORAL at 06:27

## 2020-10-06 RX ADMIN — DIPHENHYDRAMINE HYDROCHLORIDE 25 MG: 50 INJECTION, SOLUTION INTRAMUSCULAR; INTRAVENOUS at 08:49

## 2020-10-06 RX ADMIN — MORPHINE SULFATE 2 MG: 2 INJECTION, SOLUTION INTRAMUSCULAR; INTRAVENOUS at 04:24

## 2020-10-06 RX ADMIN — DICYCLOMINE HYDROCHLORIDE 20 MG: 10 CAPSULE ORAL at 06:27

## 2020-10-06 RX ADMIN — METHOCARBAMOL TABLETS 500 MG: 500 TABLET, COATED ORAL at 06:27

## 2020-10-06 RX ADMIN — MORPHINE SULFATE 2 MG: 2 INJECTION, SOLUTION INTRAMUSCULAR; INTRAVENOUS at 08:27

## 2020-10-06 RX ADMIN — DULOXETINE HYDROCHLORIDE 30 MG: 30 CAPSULE, DELAYED RELEASE ORAL at 08:29

## 2020-10-06 RX ADMIN — MORPHINE SULFATE 2 MG: 2 INJECTION, SOLUTION INTRAMUSCULAR; INTRAVENOUS at 12:33

## 2020-10-07 ENCOUNTER — PATIENT OUTREACH (OUTPATIENT)
Dept: CASE MANAGEMENT | Facility: OTHER | Age: 65
End: 2020-10-07

## 2020-10-07 ENCOUNTER — TRANSITIONAL CARE MANAGEMENT (OUTPATIENT)
Dept: INTERNAL MEDICINE CLINIC | Facility: CLINIC | Age: 65
End: 2020-10-07

## 2020-10-07 ENCOUNTER — TELEPHONE (OUTPATIENT)
Dept: INTERNAL MEDICINE CLINIC | Facility: CLINIC | Age: 65
End: 2020-10-07

## 2020-10-07 DIAGNOSIS — R10.13 EPIGASTRIC PAIN: Primary | ICD-10-CM

## 2020-10-07 RX ORDER — CLONIDINE HYDROCHLORIDE 0.1 MG/1
0.05 TABLET ORAL EVERY EVENING
Qty: 10 TABLET | Refills: 0 | Status: SHIPPED | OUTPATIENT
Start: 2020-10-07 | End: 2021-09-13 | Stop reason: ALTCHOICE

## 2020-10-08 ENCOUNTER — TELEPHONE (OUTPATIENT)
Dept: INTERNAL MEDICINE CLINIC | Facility: CLINIC | Age: 65
End: 2020-10-08

## 2020-10-09 LAB
COPRO1 24H UR-MCNC: 25 UG/L
COPRO1 24H UR-MRATE: 24 UG/24 HR (ref 0–24)
COPRO3 24H UR-MCNC: 12 UG/L
COPRO3 24H UR-MRATE: 12 UG/24 HR (ref 0–74)
HEPTA-CP 24H UR-MRATE: 3 UG/24 HR (ref 0–4)
HEPTA-CP UR-MCNC: 3 UG/L
HEXA-CP 24H UR-MRATE: 0 UG/24 HR (ref 0–1)
HEXA-CP UR-MCNC: <1 UG/L
PENTA-CP 24H UR-MRATE: 0 UG/24 HR (ref 0–4)
PENTA-CP UR-MCNC: <1 UG/L
UROPOR 24H UR-MRATE: 7 UG/24 HR (ref 0–24)
UROPOR UR-MCNC: 7 UG/L

## 2020-10-12 ENCOUNTER — TELEPHONE (OUTPATIENT)
Dept: INTERNAL MEDICINE CLINIC | Facility: CLINIC | Age: 65
End: 2020-10-12

## 2020-10-15 ENCOUNTER — HOSPITAL ENCOUNTER (OUTPATIENT)
Dept: RADIOLOGY | Facility: HOSPITAL | Age: 65
Discharge: HOME | End: 2020-10-15
Attending: INTERNAL MEDICINE
Payer: COMMERCIAL

## 2020-10-15 ENCOUNTER — OFFICE VISIT (OUTPATIENT)
Dept: INTERNAL MEDICINE CLINIC | Facility: CLINIC | Age: 65
End: 2020-10-15
Payer: COMMERCIAL

## 2020-10-15 VITALS
SYSTOLIC BLOOD PRESSURE: 98 MMHG | TEMPERATURE: 98.6 F | HEIGHT: 69 IN | BODY MASS INDEX: 17.77 KG/M2 | DIASTOLIC BLOOD PRESSURE: 62 MMHG | WEIGHT: 120 LBS | RESPIRATION RATE: 16 BRPM | HEART RATE: 78 BPM | OXYGEN SATURATION: 99 %

## 2020-10-15 DIAGNOSIS — R10.84 GENERALIZED ABDOMINAL PAIN: Primary | ICD-10-CM

## 2020-10-15 DIAGNOSIS — R39.11 URINARY HESITANCY: ICD-10-CM

## 2020-10-15 DIAGNOSIS — R10.84 GENERALIZED ABDOMINAL PAIN: ICD-10-CM

## 2020-10-15 PROCEDURE — 74022 RADEX COMPL AQT ABD SERIES: CPT

## 2020-10-15 PROCEDURE — 99212 OFFICE O/P EST SF 10 MIN: CPT | Performed by: INTERNAL MEDICINE

## 2020-10-15 RX ORDER — TRAMADOL HYDROCHLORIDE 50 MG/1
50 TABLET ORAL 2 TIMES DAILY PRN
Qty: 10 TABLET | Refills: 0 | Status: SHIPPED | OUTPATIENT
Start: 2020-10-15 | End: 2020-10-20 | Stop reason: SDUPTHER

## 2020-10-15 RX ORDER — SULFAMETHOXAZOLE AND TRIMETHOPRIM 800; 160 MG/1; MG/1
1 TABLET ORAL EVERY 12 HOURS SCHEDULED
Qty: 14 TABLET | Refills: 0 | Status: SHIPPED | OUTPATIENT
Start: 2020-10-15 | End: 2020-10-21 | Stop reason: SDUPTHER

## 2020-10-19 ENCOUNTER — TELEPHONE (OUTPATIENT)
Dept: INTERNAL MEDICINE CLINIC | Facility: CLINIC | Age: 65
End: 2020-10-19

## 2020-10-20 ENCOUNTER — TELEPHONE (OUTPATIENT)
Dept: INTERNAL MEDICINE CLINIC | Facility: CLINIC | Age: 65
End: 2020-10-20

## 2020-10-20 DIAGNOSIS — R10.84 GENERALIZED ABDOMINAL PAIN: ICD-10-CM

## 2020-10-20 DIAGNOSIS — R39.11 URINARY HESITANCY: ICD-10-CM

## 2020-10-20 RX ORDER — TRAMADOL HYDROCHLORIDE 50 MG/1
50 TABLET ORAL 2 TIMES DAILY PRN
Qty: 10 TABLET | Refills: 0 | Status: SHIPPED | OUTPATIENT
Start: 2020-10-20 | End: 2020-10-30 | Stop reason: SDUPTHER

## 2020-10-21 RX ORDER — SULFAMETHOXAZOLE AND TRIMETHOPRIM 800; 160 MG/1; MG/1
1 TABLET ORAL EVERY 12 HOURS SCHEDULED
Qty: 14 TABLET | Refills: 0 | Status: SHIPPED | OUTPATIENT
Start: 2020-10-21 | End: 2020-10-28

## 2020-10-30 ENCOUNTER — TELEPHONE (OUTPATIENT)
Dept: INTERNAL MEDICINE CLINIC | Facility: CLINIC | Age: 65
End: 2020-10-30

## 2020-10-30 DIAGNOSIS — R10.84 GENERALIZED ABDOMINAL PAIN: ICD-10-CM

## 2020-10-30 RX ORDER — TRAMADOL HYDROCHLORIDE 50 MG/1
50 TABLET ORAL
Qty: 7 TABLET | Refills: 0 | Status: SHIPPED | OUTPATIENT
Start: 2020-10-30 | End: 2020-11-12 | Stop reason: SDUPTHER

## 2020-11-02 ENCOUNTER — APPOINTMENT (OUTPATIENT)
Dept: LAB | Facility: CLINIC | Age: 65
End: 2020-11-02
Payer: COMMERCIAL

## 2020-11-02 ENCOUNTER — OFFICE VISIT (OUTPATIENT)
Dept: INTERNAL MEDICINE CLINIC | Facility: CLINIC | Age: 65
End: 2020-11-02

## 2020-11-02 VITALS
TEMPERATURE: 98.5 F | OXYGEN SATURATION: 99 % | DIASTOLIC BLOOD PRESSURE: 68 MMHG | HEART RATE: 66 BPM | HEIGHT: 69 IN | BODY MASS INDEX: 19.26 KG/M2 | SYSTOLIC BLOOD PRESSURE: 102 MMHG | WEIGHT: 130 LBS

## 2020-11-02 DIAGNOSIS — R35.89 POLYURIA: ICD-10-CM

## 2020-11-02 DIAGNOSIS — R10.13 EPIGASTRIC PAIN: Primary | ICD-10-CM

## 2020-11-02 LAB
BACTERIA UR QL AUTO: NORMAL /HPF
BILIRUB UR QL STRIP: NEGATIVE
CLARITY UR: NORMAL
COLOR UR: YELLOW
GLUCOSE UR STRIP-MCNC: NEGATIVE MG/DL
HGB UR QL STRIP.AUTO: NEGATIVE
HYALINE CASTS #/AREA URNS LPF: NORMAL /LPF
KETONES UR STRIP-MCNC: NEGATIVE MG/DL
LEUKOCYTE ESTERASE UR QL STRIP: NEGATIVE
NITRITE UR QL STRIP: NEGATIVE
NON-SQ EPI CELLS URNS QL MICRO: NORMAL /HPF
PH UR STRIP.AUTO: 7.5 [PH]
PROT UR STRIP-MCNC: NEGATIVE MG/DL
RBC #/AREA URNS AUTO: NORMAL /HPF
SP GR UR STRIP.AUTO: 1.01 (ref 1–1.03)
UROBILINOGEN UR QL STRIP.AUTO: 0.2 E.U./DL
WBC #/AREA URNS AUTO: NORMAL /HPF

## 2020-11-02 PROCEDURE — 81001 URINALYSIS AUTO W/SCOPE: CPT

## 2020-11-02 PROCEDURE — 87086 URINE CULTURE/COLONY COUNT: CPT

## 2020-11-02 PROCEDURE — 99212 OFFICE O/P EST SF 10 MIN: CPT | Performed by: INTERNAL MEDICINE

## 2020-11-03 ENCOUNTER — TELEPHONE (OUTPATIENT)
Dept: INTERNAL MEDICINE CLINIC | Facility: CLINIC | Age: 65
End: 2020-11-03

## 2020-11-03 DIAGNOSIS — R35.1 BPH ASSOCIATED WITH NOCTURIA: Primary | ICD-10-CM

## 2020-11-03 DIAGNOSIS — N40.1 BPH ASSOCIATED WITH NOCTURIA: Primary | ICD-10-CM

## 2020-11-03 LAB — BACTERIA UR CULT: NORMAL

## 2020-11-03 RX ORDER — TAMSULOSIN HYDROCHLORIDE 0.4 MG/1
0.4 CAPSULE ORAL
Qty: 30 CAPSULE | Refills: 3 | Status: SHIPPED | OUTPATIENT
Start: 2020-11-03 | End: 2021-02-26

## 2020-11-10 ENCOUNTER — OFFICE VISIT (OUTPATIENT)
Dept: GASTROENTEROLOGY | Facility: CLINIC | Age: 65
End: 2020-11-10
Payer: COMMERCIAL

## 2020-11-10 VITALS
BODY MASS INDEX: 19.51 KG/M2 | WEIGHT: 132.13 LBS | TEMPERATURE: 97.4 F | DIASTOLIC BLOOD PRESSURE: 62 MMHG | SYSTOLIC BLOOD PRESSURE: 110 MMHG | HEART RATE: 67 BPM

## 2020-11-10 DIAGNOSIS — R10.9 ABDOMINAL CRAMPING: ICD-10-CM

## 2020-11-10 DIAGNOSIS — G89.29 CHRONIC ABDOMINAL PAIN: ICD-10-CM

## 2020-11-10 DIAGNOSIS — R10.9 CHRONIC ABDOMINAL PAIN: ICD-10-CM

## 2020-11-10 DIAGNOSIS — K21.9 GASTROESOPHAGEAL REFLUX DISEASE, UNSPECIFIED WHETHER ESOPHAGITIS PRESENT: Primary | ICD-10-CM

## 2020-11-10 PROCEDURE — 99214 OFFICE O/P EST MOD 30 MIN: CPT | Performed by: PHYSICIAN ASSISTANT

## 2020-11-10 RX ORDER — DICYCLOMINE HCL 20 MG
20 TABLET ORAL EVERY 6 HOURS
Qty: 120 TABLET | Refills: 2 | Status: SHIPPED | OUTPATIENT
Start: 2020-11-10 | End: 2021-03-16 | Stop reason: ALTCHOICE

## 2020-11-10 RX ORDER — FAMOTIDINE 40 MG/1
40 TABLET, FILM COATED ORAL 2 TIMES DAILY PRN
Qty: 60 TABLET | Refills: 2 | Status: SHIPPED | OUTPATIENT
Start: 2020-11-10 | End: 2021-09-13 | Stop reason: ALTCHOICE

## 2020-11-10 RX ORDER — OMEPRAZOLE 40 MG/1
40 CAPSULE, DELAYED RELEASE ORAL 2 TIMES DAILY
Qty: 60 CAPSULE | Refills: 3 | Status: SHIPPED | OUTPATIENT
Start: 2020-11-10 | End: 2021-09-13 | Stop reason: ALTCHOICE

## 2020-11-11 ENCOUNTER — TELEPHONE (OUTPATIENT)
Dept: INTERNAL MEDICINE CLINIC | Facility: CLINIC | Age: 65
End: 2020-11-11

## 2020-11-11 DIAGNOSIS — R10.84 GENERALIZED ABDOMINAL PAIN: ICD-10-CM

## 2020-11-12 RX ORDER — TRAMADOL HYDROCHLORIDE 50 MG/1
50 TABLET ORAL
Qty: 7 TABLET | Refills: 0 | Status: SHIPPED | OUTPATIENT
Start: 2020-11-12 | End: 2021-09-13 | Stop reason: ALTCHOICE

## 2020-11-16 ENCOUNTER — TELEPHONE (OUTPATIENT)
Dept: GASTROENTEROLOGY | Facility: CLINIC | Age: 65
End: 2020-11-16

## 2020-11-16 DIAGNOSIS — K59.00 CONSTIPATION, UNSPECIFIED CONSTIPATION TYPE: Primary | ICD-10-CM

## 2020-11-16 RX ORDER — LINACLOTIDE 72 UG/1
1 CAPSULE, GELATIN COATED ORAL
Qty: 30 CAPSULE | Refills: 5 | Status: SHIPPED | OUTPATIENT
Start: 2020-11-16 | End: 2021-09-13 | Stop reason: ALTCHOICE

## 2020-11-25 ENCOUNTER — TELEPHONE (OUTPATIENT)
Dept: INTERNAL MEDICINE CLINIC | Facility: CLINIC | Age: 65
End: 2020-11-25

## 2020-11-25 DIAGNOSIS — R10.84 GENERALIZED ABDOMINAL PAIN: Primary | ICD-10-CM

## 2020-11-26 RX ORDER — SULFAMETHOXAZOLE AND TRIMETHOPRIM 800; 160 MG/1; MG/1
1 TABLET ORAL EVERY 12 HOURS SCHEDULED
Qty: 14 TABLET | Refills: 0 | Status: SHIPPED | OUTPATIENT
Start: 2020-11-26 | End: 2020-12-03

## 2020-12-10 DIAGNOSIS — K25.3 ACUTE GASTRIC ULCER WITHOUT HEMORRHAGE OR PERFORATION: ICD-10-CM

## 2020-12-10 DIAGNOSIS — R10.9 ACUTE ABDOMINAL PAIN: ICD-10-CM

## 2020-12-10 RX ORDER — SUCRALFATE 1 G/1
1 TABLET ORAL
Qty: 120 TABLET | Refills: 1 | Status: SHIPPED | OUTPATIENT
Start: 2020-12-10 | End: 2021-03-16 | Stop reason: ALTCHOICE

## 2021-02-25 DIAGNOSIS — N40.1 BPH ASSOCIATED WITH NOCTURIA: ICD-10-CM

## 2021-02-25 DIAGNOSIS — R35.1 BPH ASSOCIATED WITH NOCTURIA: ICD-10-CM

## 2021-02-26 RX ORDER — TAMSULOSIN HYDROCHLORIDE 0.4 MG/1
CAPSULE ORAL
Qty: 30 CAPSULE | Refills: 5 | Status: SHIPPED | OUTPATIENT
Start: 2021-02-26 | End: 2021-09-13 | Stop reason: SDUPTHER

## 2021-03-02 ENCOUNTER — OFFICE VISIT (OUTPATIENT)
Dept: INTERNAL MEDICINE CLINIC | Facility: CLINIC | Age: 66
End: 2021-03-02

## 2021-03-02 VITALS
TEMPERATURE: 98 F | WEIGHT: 147 LBS | DIASTOLIC BLOOD PRESSURE: 70 MMHG | OXYGEN SATURATION: 98 % | HEIGHT: 69 IN | HEART RATE: 77 BPM | SYSTOLIC BLOOD PRESSURE: 114 MMHG | BODY MASS INDEX: 21.77 KG/M2

## 2021-03-02 DIAGNOSIS — M25.562 ACUTE PAIN OF LEFT KNEE: Primary | ICD-10-CM

## 2021-03-02 PROCEDURE — 99212 OFFICE O/P EST SF 10 MIN: CPT | Performed by: INTERNAL MEDICINE

## 2021-03-02 NOTE — PROGRESS NOTES
Assessment/Plan:       With a waxing nature, suspect osteoarthritis  We are limited in our medication options because of his prior stomach issues  We can try Voltaren gel  Suggested tart cherry tablets and turmeric  Once he gets insurance, he would be willing to see Ortho  Quality Measures:       No follow-ups on file  No problem-specific Assessment & Plan notes found for this encounter  Diagnoses and all orders for this visit:    Acute pain of left knee  -     Diclofenac Sodium (VOLTAREN) 1 %; Apply 2 g topically 4 (four) times a day          Subjective:      Patient ID: Epifanio Raines is a 60 y o  male  Patient comes in today complaining of episodic left knee pain that comes and goes no matter what he does  He does admit to some stiffness with inactivity  Denies any trauma  That knee is usually swollen compared to the right  Some warmth        ALLERGIES:  Allergies   Allergen Reactions    Other Anaphylaxis     Shellfish, shrimp    Dilaudid [Hydromorphone]        CURRENT MEDICATIONS:    Current Outpatient Medications:     aspirin (ECOTRIN LOW STRENGTH) 81 mg EC tablet, Take 81 mg by mouth daily, Disp: , Rfl:     finasteride (PROSCAR) 5 mg tablet, Take 1 tablet (5 mg total) by mouth daily, Disp: 90 tablet, Rfl: 3    tamsulosin (FLOMAX) 0 4 mg, TAKE ONE CAPSULE BY MOUTH ONCE DAILY WITH DINNER, Disp: 30 capsule, Rfl: 5    aluminum-magnesium hydroxide-simethicone (MYLANTA) 200-200-20 mg/5 mL suspension, Take 30 mL by mouth every 4 (four) hours as needed for indigestion or heartburn (Patient not taking: Reported on 3/2/2021), Disp: 355 mL, Rfl: 0    bisacodyl (DULCOLAX) 10 mg suppository, Insert 1 suppository (10 mg total) into the rectum daily as needed for constipation (Patient not taking: Reported on 3/2/2021), Disp: 12 suppository, Rfl: 0    cloNIDine (CATAPRES) 0 1 mg tablet, Take 0 5 tablets (0 05 mg total) by mouth every evening (Patient not taking: Reported on 3/2/2021), Disp: 10 tablet, Rfl: 0    cyclobenzaprine (FLEXERIL) 5 mg tablet, Take 1 tablet (5 mg total) by mouth daily at bedtime for 10 days (Patient not taking: Reported on 10/15/2020), Disp: 10 tablet, Rfl: 0    Diclofenac Sodium (VOLTAREN) 1 %, Apply 2 g topically 4 (four) times a day, Disp: 50 g, Rfl: 1    dicyclomine (BENTYL) 10 mg capsule, Take 2 capsules (20 mg total) by mouth 3 (three) times a day before meals for 14 days (Patient not taking: Reported on 3/2/2021), Disp: 84 capsule, Rfl: 0    dicyclomine (BENTYL) 20 mg tablet, Take 1 tablet (20 mg total) by mouth every 6 (six) hours (Patient not taking: Reported on 3/2/2021), Disp: 120 tablet, Rfl: 2    DULoxetine (CYMBALTA) 30 mg delayed release capsule, Take 30 mg by mouth daily, Disp: , Rfl:     famotidine (PEPCID) 40 MG tablet, Take 1 tablet (40 mg total) by mouth 2 (two) times a day as needed for heartburn (Patient not taking: Reported on 3/2/2021), Disp: 60 tablet, Rfl: 2    linaCLOtide (Linzess) 72 MCG CAPS, Take 1 capsule by mouth daily before breakfast (Patient not taking: Reported on 3/2/2021), Disp: 30 capsule, Rfl: 5    linaCLOtide 72 MCG CAPS, Take 72 mcg by mouth daily (Patient not taking: Reported on 3/2/2021), Disp: 30 capsule, Rfl: 3    omeprazole (PriLOSEC) 40 MG capsule, Take 1 capsule (40 mg total) by mouth 2 (two) times a day 30-60 minutes before breakfast and dinner (Patient not taking: Reported on 3/2/2021), Disp: 60 capsule, Rfl: 3    polyethylene glycol (MIRALAX) 17 g packet, Take 17 g by mouth 2 (two) times a day for 14 days (Patient not taking: Reported on 10/15/2020), Disp: 476 g, Rfl: 0    senna-docusate sodium (SENOKOT S) 8 6-50 mg per tablet, Take 1 tablet by mouth daily at bedtime (Patient not taking: Reported on 3/2/2021), Disp: 7 tablet, Rfl: 0    simethicone (MYLICON) 40 RL/9 2 mL drops, Take 0 6 mL (40 mg total) by mouth every 6 (six) hours as needed for flatulence (Patient not taking: Reported on 3/2/2021), Disp: 30 mL, Rfl: 0    sucralfate (CARAFATE) 1 g tablet, Take 1 tablet (1 g total) by mouth 4 (four) times a day (before meals and at bedtime) (Patient not taking: Reported on 3/2/2021), Disp: 120 tablet, Rfl: 1    traMADol (ULTRAM) 50 mg tablet, Take 1 tablet (50 mg total) by mouth daily at bedtime as needed for moderate pain (Patient not taking: Reported on 3/2/2021), Disp: 7 tablet, Rfl: 0    ACTIVE PROBLEM LIST:  Patient Active Problem List   Diagnosis    Ureterocele    BPH associated with nocturia    Epigastric pain    Nicotine dependence    Acute back pain    Severe protein-calorie malnutrition ([facility] [address] )    History of solitary pulmonary nodule    Acute abdominal pain    Nutcracker phenomenon of renal vein    Acute gastric ulcer without hemorrhage or perforation    Lumbar disc disease    Dyspepsia    SOB (shortness of breath)    Chronic abdominal pain       PAST MEDICAL HISTORY:  Past Medical History:   Diagnosis Date    Anemia     Atrial fibrillation (HCC)     COPD (chronic obstructive pulmonary disease) ([facility] [address] )     Hernia cerebri (HCC)     Hyperlipidemia     Lung nodule     RESOLVED 26 JUN 2016    Renal calculi     Sexual dysfunction        PAST SURGICAL HISTORY:  Past Surgical History:   Procedure Laterality Date    FACIAL COSMETIC SURGERY      pt has metal hardware throughout face    FACIAL COSMETIC SURGERY      KIDNEY SURGERY      ORBITAL FRACTURE SURGERY      CLOSED TREATMENT OF ORBITAL FRACTURE(NON-BLOWOUT)    TONSILLECTOMY      VASECTOMY         FAMILY HISTORY:  Family History   Problem Relation Age of Onset    Stroke Mother     Diabetes Mother     Hypertension Mother     Skin cancer Mother     Heart disease Mother     Scoliosis Sister        SOCIAL HISTORY:  Social History     Socioeconomic History    Marital status: /Civil Union     Spouse name: Not on file    Number of children: Not on file    Years of education: Not on file    Highest education level: Not on file Occupational History    Not on file   Social Needs    Financial resource strain: Not on file    Food insecurity     Worry: Not on file     Inability: Not on file    Transportation needs     Medical: Not on file     Non-medical: Not on file   Tobacco Use    Smoking status: Current Every Day Smoker     Packs/day: 1 00     Types: Cigarettes    Smokeless tobacco: Former User     Quit date: 9/17/2020   Substance and Sexual Activity    Alcohol use: Never     Frequency: Never     Drinks per session: Patient refused     Binge frequency: Never    Drug use: No    Sexual activity: Not on file   Lifestyle    Physical activity     Days per week: Not on file     Minutes per session: Not on file    Stress: Not on file   Relationships    Social connections     Talks on phone: Not on file     Gets together: Not on file     Attends Nondenominational service: Not on file     Active member of club or organization: Not on file     Attends meetings of clubs or organizations: Not on file     Relationship status: Not on file    Intimate partner violence     Fear of current or ex partner: Not on file     Emotionally abused: Not on file     Physically abused: Not on file     Forced sexual activity: Not on file   Other Topics Concern    Not on file   Social History Narrative    Not on file       Review of Systems   Constitutional: Negative for fever  Musculoskeletal: Positive for arthralgias  Objective:  Vitals:    03/02/21 1306   BP: 114/70   BP Location: Left arm   Patient Position: Sitting   Cuff Size: Adult   Pulse: 77   Temp: 98 °F (36 7 °C)   TempSrc: Temporal   SpO2: 98%   Weight: 66 7 kg (147 lb)   Height: 5' 9" (1 753 m)     Body mass index is 21 71 kg/m²  Physical Exam  Vitals signs and nursing note reviewed  Constitutional:       Appearance: Normal appearance  Musculoskeletal:         General: Swelling ( left knee) present  No tenderness (  Left knee)             RESULTS:    No results found for this or any previous visit (from the past 1008 hour(s))  This note was created with voice recognition software  Phonic, grammatical and spelling errors may be present within the note as a result

## 2021-03-15 ENCOUNTER — TELEPHONE (OUTPATIENT)
Dept: INTERNAL MEDICINE CLINIC | Facility: CLINIC | Age: 66
End: 2021-03-15

## 2021-03-15 NOTE — TELEPHONE ENCOUNTER
Pt wife called,he was here for his knee recently, yet  hes complaining of all other joint pain problems s now  Fluid comes and goes    They suspect lyme possibly?      please advise on next step

## 2021-03-16 ENCOUNTER — OFFICE VISIT (OUTPATIENT)
Dept: INTERNAL MEDICINE CLINIC | Facility: CLINIC | Age: 66
End: 2021-03-16

## 2021-03-16 VITALS
HEIGHT: 69 IN | TEMPERATURE: 99.5 F | WEIGHT: 152 LBS | SYSTOLIC BLOOD PRESSURE: 112 MMHG | OXYGEN SATURATION: 98 % | BODY MASS INDEX: 22.51 KG/M2 | HEART RATE: 89 BPM | DIASTOLIC BLOOD PRESSURE: 68 MMHG

## 2021-03-16 DIAGNOSIS — M25.50 ARTHRALGIA, UNSPECIFIED JOINT: ICD-10-CM

## 2021-03-16 DIAGNOSIS — M70.21 OLECRANON BURSITIS OF RIGHT ELBOW: Primary | ICD-10-CM

## 2021-03-16 PROCEDURE — 99212 OFFICE O/P EST SF 10 MIN: CPT | Performed by: INTERNAL MEDICINE

## 2021-03-16 NOTE — PROGRESS NOTES
Assessment/Plan:       Not really convinced that the joint pains are into related  He definitely has olecranon bursitis  Told him to wrap it with a neoprene sleeve  He may use the diclofenac cream on that area as well  Only if he gets another joint symptom will he have the Lyme titer done  Cost is a consideration  Quality Measures:       Return if symptoms worsen or fail to improve  No problem-specific Assessment & Plan notes found for this encounter  Diagnoses and all orders for this visit:    Olecranon bursitis of right elbow    Arthralgia, unspecified joint  -     Lyme Antibody Profile with reflex to WB; Future          Subjective:      Patient ID: Li Celis is a 60 y o  male  Patient comes in today because his wife a call that he had some other joint pains and she was concerned about Lyme  He really does not going to the woods  There is 1 dog in the house  The knee pain went away on its own and he notes then his right ankle hurt a day or 2 later  But that went away  Now his right elbow is bothering him  He noted he had some swelling there  Much less now  But still a little tender        ALLERGIES:  Allergies   Allergen Reactions    Other Anaphylaxis     Shellfish, shrimp    Dilaudid [Hydromorphone]        CURRENT MEDICATIONS:    Current Outpatient Medications:     aspirin (ECOTRIN LOW STRENGTH) 81 mg EC tablet, Take 81 mg by mouth daily, Disp: , Rfl:     Diclofenac Sodium (VOLTAREN) 1 %, Apply 2 g topically 4 (four) times a day, Disp: 50 g, Rfl: 1    DULoxetine (CYMBALTA) 30 mg delayed release capsule, Take 30 mg by mouth daily, Disp: , Rfl:     finasteride (PROSCAR) 5 mg tablet, Take 1 tablet (5 mg total) by mouth daily, Disp: 90 tablet, Rfl: 3    tamsulosin (FLOMAX) 0 4 mg, TAKE ONE CAPSULE BY MOUTH ONCE DAILY WITH DINNER, Disp: 30 capsule, Rfl: 5    bisacodyl (DULCOLAX) 10 mg suppository, Insert 1 suppository (10 mg total) into the rectum daily as needed for constipation (Patient not taking: Reported on 3/2/2021), Disp: 12 suppository, Rfl: 0    cloNIDine (CATAPRES) 0 1 mg tablet, Take 0 5 tablets (0 05 mg total) by mouth every evening (Patient not taking: Reported on 3/2/2021), Disp: 10 tablet, Rfl: 0    famotidine (PEPCID) 40 MG tablet, Take 1 tablet (40 mg total) by mouth 2 (two) times a day as needed for heartburn (Patient not taking: Reported on 3/2/2021), Disp: 60 tablet, Rfl: 2    linaCLOtide (Linzess) 72 MCG CAPS, Take 1 capsule by mouth daily before breakfast (Patient not taking: Reported on 3/2/2021), Disp: 30 capsule, Rfl: 5    linaCLOtide 72 MCG CAPS, Take 72 mcg by mouth daily (Patient not taking: Reported on 3/2/2021), Disp: 30 capsule, Rfl: 3    omeprazole (PriLOSEC) 40 MG capsule, Take 1 capsule (40 mg total) by mouth 2 (two) times a day 30-60 minutes before breakfast and dinner (Patient not taking: Reported on 3/2/2021), Disp: 60 capsule, Rfl: 3    polyethylene glycol (MIRALAX) 17 g packet, Take 17 g by mouth 2 (two) times a day for 14 days (Patient not taking: Reported on 10/15/2020), Disp: 476 g, Rfl: 0    senna-docusate sodium (SENOKOT S) 8 6-50 mg per tablet, Take 1 tablet by mouth daily at bedtime (Patient not taking: Reported on 3/2/2021), Disp: 7 tablet, Rfl: 0    simethicone (MYLICON) 40 TH/7 0 mL drops, Take 0 6 mL (40 mg total) by mouth every 6 (six) hours as needed for flatulence (Patient not taking: Reported on 3/2/2021), Disp: 30 mL, Rfl: 0    traMADol (ULTRAM) 50 mg tablet, Take 1 tablet (50 mg total) by mouth daily at bedtime as needed for moderate pain (Patient not taking: Reported on 3/2/2021), Disp: 7 tablet, Rfl: 0    ACTIVE PROBLEM LIST:  Patient Active Problem List   Diagnosis    Ureterocele    BPH associated with nocturia    Epigastric pain    Nicotine dependence    Acute back pain    Severe protein-calorie malnutrition ([address] )    History of solitary pulmonary nodule    Acute abdominal pain    Nutcracker phenomenon of renal vein    Acute gastric ulcer without hemorrhage or perforation    Lumbar disc disease    Dyspepsia    SOB (shortness of breath)    Chronic abdominal pain       PAST MEDICAL HISTORY:  Past Medical History:   Diagnosis Date    Anemia     Atrial fibrillation (HCC)     COPD (chronic obstructive pulmonary disease) (HCC)     Hernia cerebri (HCC)     Hyperlipidemia     Lung nodule     RESOLVED 26 JUN 2016    Renal calculi     Sexual dysfunction        PAST SURGICAL HISTORY:  Past Surgical History:   Procedure Laterality Date    FACIAL COSMETIC SURGERY      pt has metal hardware throughout face    FACIAL COSMETIC SURGERY      KIDNEY SURGERY      ORBITAL FRACTURE SURGERY      CLOSED TREATMENT OF ORBITAL FRACTURE(NON-BLOWOUT)    TONSILLECTOMY      VASECTOMY         FAMILY HISTORY:  Family History   Problem Relation Age of Onset    Stroke Mother     Diabetes Mother     Hypertension Mother     Skin cancer Mother     Heart disease Mother     Scoliosis Sister        SOCIAL HISTORY:  Social History     Socioeconomic History    Marital status: /Civil Union     Spouse name: Not on file    Number of children: Not on file    Years of education: Not on file    Highest education level: Not on file   Occupational History    Not on file   Social Needs    Financial resource strain: Not on file    Food insecurity     Worry: Not on file     Inability: Not on file    Transportation needs     Medical: Not on file     Non-medical: Not on file   Tobacco Use    Smoking status: Current Every Day Smoker     Packs/day: 1 00     Types: Cigarettes    Smokeless tobacco: Former User     Quit date: 9/17/2020   Substance and Sexual Activity    Alcohol use: Never     Frequency: Never     Drinks per session: Patient refused     Binge frequency: Never    Drug use: No    Sexual activity: Not on file   Lifestyle    Physical activity     Days per week: Not on file     Minutes per session: Not on file    Stress: Not on file   Relationships    Social connections     Talks on phone: Not on file     Gets together: Not on file     Attends Faith service: Not on file     Active member of club or organization: Not on file     Attends meetings of clubs or organizations: Not on file     Relationship status: Not on file    Intimate partner violence     Fear of current or ex partner: Not on file     Emotionally abused: Not on file     Physically abused: Not on file     Forced sexual activity: Not on file   Other Topics Concern    Not on file   Social History Narrative    Not on file       Review of Systems   Constitutional: Negative for fever  Musculoskeletal: Positive for arthralgias  Objective:  Vitals:    03/16/21 1036   BP: 112/68   BP Location: Left arm   Patient Position: Sitting   Cuff Size: Adult   Pulse: 89   Temp: 99 5 °F (37 5 °C)   TempSrc: Temporal   SpO2: 98%   Weight: 68 9 kg (152 lb)   Height: 5' 9" (1 753 m)     Body mass index is 22 45 kg/m²  Physical Exam  Constitutional:       Appearance: Normal appearance  Musculoskeletal:      Comments:   Soft, nontender swelling of the right elbow   Neurological:      Mental Status: He is alert  RESULTS:    No results found for this or any previous visit (from the past 1008 hour(s))  This note was created with voice recognition software  Phonic, grammatical and spelling errors may be present within the note as a result

## 2021-03-16 NOTE — TELEPHONE ENCOUNTER
Patient's wife called back and an appt will be scheduled for tomorrow 3/17 unless a cancellation comes in for today  Wife only wants patient to see Dr Kip Randhawa other providers were offered

## 2021-03-27 ENCOUNTER — APPOINTMENT (OUTPATIENT)
Dept: LAB | Facility: HOSPITAL | Age: 66
End: 2021-03-27
Attending: INTERNAL MEDICINE
Payer: COMMERCIAL

## 2021-03-27 DIAGNOSIS — M25.50 ARTHRALGIA, UNSPECIFIED JOINT: ICD-10-CM

## 2021-03-27 PROCEDURE — 36415 COLL VENOUS BLD VENIPUNCTURE: CPT

## 2021-03-27 PROCEDURE — 86617 LYME DISEASE ANTIBODY: CPT

## 2021-03-27 PROCEDURE — 86618 LYME DISEASE ANTIBODY: CPT

## 2021-03-30 LAB — B BURGDOR IGG+IGM SER-ACNC: 2173

## 2021-03-31 LAB
B BURGDOR IGG PATRN SER IB-IMP: POSITIVE
B BURGDOR IGM PATRN SER IB-IMP: NEGATIVE
B BURGDOR18KD IGG SER QL IB: PRESENT
B BURGDOR23KD IGG SER QL IB: PRESENT
B BURGDOR23KD IGM SER QL IB: PRESENT
B BURGDOR28KD IGG SER QL IB: PRESENT
B BURGDOR30KD IGG SER QL IB: PRESENT
B BURGDOR39KD IGG SER QL IB: PRESENT
B BURGDOR39KD IGM SER QL IB: ABNORMAL
B BURGDOR41KD IGG SER QL IB: PRESENT
B BURGDOR41KD IGM SER QL IB: ABNORMAL
B BURGDOR45KD IGG SER QL IB: PRESENT
B BURGDOR58KD IGG SER QL IB: PRESENT
B BURGDOR66KD IGG SER QL IB: PRESENT
B BURGDOR93KD IGG SER QL IB: PRESENT

## 2021-04-01 ENCOUNTER — TELEPHONE (OUTPATIENT)
Dept: INTERNAL MEDICINE CLINIC | Facility: CLINIC | Age: 66
End: 2021-04-01

## 2021-04-01 DIAGNOSIS — A69.23 LYME ARTHRITIS (HCC): Primary | ICD-10-CM

## 2021-04-01 RX ORDER — DOXYCYCLINE HYCLATE 100 MG
100 TABLET ORAL 2 TIMES DAILY
Qty: 42 TABLET | Refills: 0 | Status: SHIPPED | OUTPATIENT
Start: 2021-04-01 | End: 2021-04-22

## 2021-04-01 NOTE — TELEPHONE ENCOUNTER
Patient's wife called back and message given   Jonathan Ellington said to tell Dr Nilay Vang Thank You!!

## 2021-04-29 DIAGNOSIS — N40.0 BENIGN PROSTATIC HYPERPLASIA, UNSPECIFIED WHETHER LOWER URINARY TRACT SYMPTOMS PRESENT: ICD-10-CM

## 2021-04-29 RX ORDER — FINASTERIDE 5 MG/1
TABLET, FILM COATED ORAL
Qty: 90 TABLET | Refills: 0 | Status: SHIPPED | OUTPATIENT
Start: 2021-04-29 | End: 2021-07-14

## 2021-05-28 NOTE — UTILIZATION REVIEW
URGENT/EMERGENT  INPATIENT/SPU AUTHORIZATION REQUEST    Date: 05/28/21            # Pages in this Request:     X New Request   Additional Information for PA#:     Office Contact Name:  Carlotta Vera Title: Utilization Review, Lily Nurse     Phone: [phone]  Ext  Availability (Date/Time): Wednesday - Friday 8 am- 4 pm    x Inpatient Review  SPU Review        Current       x Late Pick-up   · How your facility was first notified of the Late Pick-up: Paths Letter   · When your facility was first notified of the Late Pick-up (date): 5/18/2021         RECIPIENT INFORMATION    Recipient ID#: [id number]   Recipient Name: Klever Henriquez       YOB: 1955  60 y o  Recipient Alias:     Gender:  X Male  Female Medicaid Eligibility ([address] [place] [address]): INSURANCE INFORMATION    (All other private or governmental health insurance benefits must be utilized prior to billing the [place] Program)    Was this admission the result of an MVA, other accident, assault, injury, fall, gunshot, bite etc ? Yes X No                   If yes, provide a brief description of the incident  Does the recipient have other insurance coverage? Yes x No        Insurance Company Name/Policy #      Did that insurance pay on this claim? Yes  No        Did that insurance deny this claim? Yes  No    If yes, reason for denial:      Does the recipient have Medicare? Yes x No        Did Medicare exhaust prior to this admission? Yes  No        Did Medicare partially pay this claim? Yes  No        Did that insurance deny this claim? Yes  No    If yes, reason for denial:          Was the recipient a prisoner at the time of admission?   Yes x No            PROVIDER [address] [place] [address]  Number [phone]      Admitting Physician Name: Liset Yoon Provider ID#: [id number]        ADMISSION INFORMATION    Type of Admission: (please choose one)    X ED      Direct    If yes, from where? Transfer    If yes, transferring hospital (inpatient, rehab, or psych) Provider Name/Provider ID#: Admission Floor or Unit Type: Med Surg     Dates/Times:        ED Date/Time: 9/16/2020 10:08 PM        Observation Date/Time: 9/17/2020  04:49        Admission Date/Time: 9/18/20 1116        Discharge or Transfer Date/Time: 9/21/2020  2:13 PM        DIAGNOSIS/PROCEDURE CODES    Primary Diagnosis Code/Primary Diagnosis Code description:  K26 9 Duodenal ulcer, unspecified as acute or chronic, without hemorrhage or perforation   E43 Unspecified severe protein-calorie malnutrition   T39 391A Poisoning by other nonsteroidal anti-inflammatory drugs (nsaid), accidental (unintentional), initial encounter   K29 60 Other gastritis without bleeding   Additional Diagnosis Code(s) and Description(s)-(up to three additional codes):    Procedure Code (one) and description:  [id number] Excision of Stomach, Endo, Diagn          CLINICAL INFORMATION - PRIOR ADMISSION ONLY    Is there a prior admission with a discharge date within 30 days of the date of this admission? X No (Proceed to the next section - "Clinical Information - General Review Checklist:)      Yes (Provide the following information)     Prior admission dates:    MA Prior Authorization Number:        Review Outcome:     Diagnosis Code(s)/Description:    Procedure Code/Description:    Findings:    Treatment:    Condition on Discharge:   Vitals:    Labs:   Imaging:   Medications: Follow-up Instructions:    Disposition:        CLINICAL INFORMATION - GENERAL REVIEW CHECKLIST    EMERGENCY DEPARTMENT: (Proceed to "ADMISSION" if Direct Admission)    Presenting Signs/Symptoms:   61 yo male to ED from home w/ worsening epigastric pain x2 days   Having issues w/ back for the past month an taking 1800 mg ibuprofen q4hr as well as steroids  abd pain worse w/ food  Lost 8 lbs in the last month    Admitted OBS status for epigastric pain likely peptic ulcer /gastritis , cont protonix gtt , GI following , EGD scheduled for tomorrow, serial abd exams and monitor labs        Medication/treatment prior to arrival in the ED:    Past Medical History:   Past Medical History:   Diagnosis Date    Anemia     Atrial fibrillation ([facility] )     COPD (chronic obstructive pulmonary disease) ([facility]     Hernia cerebri (HCC)     Hyperlipidemia     Lung nodule     Renal calculi     Sexual dysfunction        Clinical Exam:    Initial Vital Signs: (Temp, Pulse, Resp, and BP)   ED Triage Vitals   Temperature Pulse Respirations Blood Pressure SpO2   09/16/20 2212 09/16/20 2212 09/16/20 2212 09/16/20 2212 09/16/20 2212   98 °F (36 7 °C) 67 20 133/77 98 %      Temp Source Heart Rate Source Patient Position - Orthostatic VS BP Location FiO2 (%)   09/16/20 2212 09/16/20 2212 09/17/20 0130 09/16/20 2212 --   Oral Monitor Lying Right arm       Pain Score       09/17/20 0050       Worst Possible Pain           Pertinent Repeat Vital Signs: (include times they were obtained)  09/17/20 0800    --    73    22    144/80    107    98 %    None (Room air)    Lying    09/17/20 0700    --    66    18    110/56    78    96 %    None (Room air)    Lying    09/17/20 0630    --    78    17    141/60    87    97 %    None (Room air)    Lying    09/17/20 0615    --    61    15    136/77    101    99 %    None (Room air)    Lying    09/17/20 0600    --    61    16    120/73    91    98 %    None (Room air)    Lying    09/17/20 0500    --    65    14    120/71    90    94 %    None (Room air)    Lying    09/17/20 0400    --    76    18    136/83    103    96 %    None (Room air)    Lying    09/17/20 0330    --    72    15    136/81    104    95 %    None (Room air)    Lying    09/17/20 0300    --    83    22    144/83    108    99 %    None (Room air)    Lying    09/17/20 0230    --    72    18    136/79    102    96 %    None (Room air)    Lying    09/17/20 0200    --    76    20    131/78    99    99 %    None (Room air)    Lying    09/17/20 0130    --    76    20    141/84    108    97 %    None (Room air)    Lying    09/17/20 0001    --    --    --    --    --    --    None (Room air            Pertinent Sustained Findings: (include times they were obtained)    Weight in Kilograms:  Wt Readings from Last 1 Encounters:   03/16/21 68 9 kg (152 lb)       Pertinent Labs (results):  Results from last 7 days   Lab Units 09/16/20  2317   WBC Thousand/uL 8 90   HEMOGLOBIN g/dL 14 5   HEMATOCRIT % 45 1   PLATELETS Thousands/uL 290   NEUTROS ABS Thousands/µL 5 15           Results from last 7 days   Lab Units 09/16/20  2317   SODIUM mmol/L 138   POTASSIUM mmol/L 4 2   CHLORIDE mmol/L 102   CO2 mmol/L 30   ANION GAP mmol/L 6   BUN mg/dL 17   CREATININE mg/dL 0 88   EGFR ml/min/1 73sq m 91   CALCIUM mg/dL 9 7           Results from last 7 days   Lab Units 09/16/20  2317   AST U/L 18   ALT U/L 39   ALK PHOS U/L 62   TOTAL PROTEIN g/dL 8 0   ALBUMIN g/dL 3 9   TOTAL BILIRUBIN mg/dL 0 40           Results from last 7 days   Lab Units 09/16/20  2317   GLUCOSE RANDOM mg/dL 103            Results from last 7 days   Lab Units 09/17/20  0202 09/16/20  2317   TROPONIN I ng/mL <0 02 <0 02            Results from last 7 days   Lab Units 09/16/20  2317   LIPASE u/L 105       Radiology (results):  9/17 CT abd No acute inflammatory process identified within the abdomen or pelvis  EKG (results):      Other tests (results):    Tests pending final results:    Treatment in the ED:   Medication Administration from 09/16/2020 2206 to 09/17/2020 1138       Date/Time Order Dose Route Action Comments     09/16/2020 2318 aluminum-magnesium hydroxide-simethicone (MYLANTA) 200-200-20 mg/5 mL oral suspension 30 mL 30 mL Oral Given      09/16/2020 2318 Lidocaine Viscous HCl (XYLOCAINE) 2 % mucosal solution 15 mL 15 mL Swish & Swallow Given      09/16/2020 2357 sucralfate (CARAFATE) oral suspension (SUDARSHAN/PEDS) 1,000 mg 1,000 mg Oral Given      09/17/2020 0050 fentanyl citrate (PF) 100 MCG/2ML 25 mcg 25 mcg Intravenous Given      09/17/2020 0157 fentanyl citrate (PF) 100 MCG/2ML 50 mcg 50 mcg Intravenous Given      09/17/2020 0322 fentanyl citrate (PF) 100 MCG/2ML 50 mcg 50 mcg Intravenous Given      09/17/2020 0245 pantoprazole (PROTONIX) injection 40 mg 40 mg Intravenous Given      09/17/2020 0245 ondansetron (ZOFRAN) injection 4 mg 4 mg Intravenous Given      09/17/2020 0240 iohexol (OMNIPAQUE) 350 MG/ML injection (MULTI-DOSE) 100 mL 100 mL Intravenous Given      09/17/2020 0407 fentanyl citrate (PF) 100 MCG/2ML 50 mcg 50 mcg Intravenous Given      09/17/2020 0452 fentanyl citrate (PF) 100 MCG/2ML 100 mcg 100 mcg Intravenous Given      09/17/2020 0454 sucralfate (CARAFATE) oral suspension (SUDARSHAN/PEDS) 1,000 mg 1,000 mg Oral Given      09/17/2020 0455 pantoprazole (PROTONIX) 80 mg in sodium chloride 0 9 % 100 mL infusion 8 mg/hr Intravenous New Bag      09/17/2020 0510 metoclopramide (REGLAN) injection 10 mg 10 mg Intravenous Given      09/17/2020 0510 diphenhydrAMINE (BENADRYL) injection 25 mg 25 mg Intravenous Given      09/17/2020 0840 morphine injection 2 mg 2 mg Intravenous Given            Other treatments:      Change in condition while in the ED:     Response to ED Treatment:          OBSERVATION: (Proceed to "ADMISSION" if Direct Admission)    Orders written during the observation period  Meds Name, dose, route, time, how may doses given:  sucralfate, 1 g, Oral, 4x Daily (AC & HS)  Benadryl 25 mg iv once  5/17 x 1       PRN Meds Name, dose, route, time, how many doses given within the first 24 hrs :      morphine injection, 2 mg, Intravenous, Q4H PRN   9/17 x 3  Fentanyl 50 mcg IV PO PRN - 9/17x 3 - 9/18 x 5     IVs Type, rate, and total amt  Ordered/given:    pantoprozole (PROTONIX) infusion (Continuous), 8 mg/hr, Intravenous, Continuous         Labs, imaging, other:    9/17 XR Thoracic Spine - No acute osseous abnormality   Age related degenerative changes are seen   9/17 XR Lumbar Spine - No acute osseous abnormality  Degenerative changes as described  Consults and findings: /17 GI consult   Epigastric pain x2 days   Recent high use NSAID use and steroids  Taking 1800mg ibuprofen q4hr for a week and a half  Plan to cont protonix gtt and carafate QID , supportive care and serial abd exams  Plan for EGD to investigate for PUD , erosive gastrotis   NPO after for EGD on 9/18  Test Results during the observation period  Pertinent Lab tests (dates/results):  Culture results (blood, urine, spinal, wound, respiratory, etc ):  Imaging tests (dates/results):  EKG (dates/results): Other test (dates/results):  Tests pending (dates/results):    Surgical or Invasive Procedures during the observation period  Name of surgery/procedure:   Date & Time  Patient Response:  Post-operative orders:  Operative Report/Findings:    Response to Treatment, Major Change in Condition, Major Charge in Treatment during the observation period  pt for EGD today , cont on protonix gtt , GI following  Monitor serial labs   Cont to c/o intractable pain located in his flanks and going into upper back also c/o periumbilical pain , feels nauseous and not been eating for the past 3 days   back pain , xrays still pending , provide oxycodone , morphine and tylenol       9/18 0542 Nursing Note   C/o severe nausea, dry heaving, went into brief SVT then back to SR/ST  Zofran given  SLIM aware  PRN fentanyl for abdominal pain with minimal relief  Pain worsened overnight despite repositioninig, heat and cold compress  SLIM aware with orders for maalox and lidocaine viscous orally given, also with minimal relief   Pt is scheduled for EGD this am          ADMISSION:    DIRECT Admissions Only:    · Presenting Signs/Symptoms:   ·   · Medication/treatment prior to arrival:  ·   · Past Medical History:  ·   · Clinical Exam on admission:  ·   · Vital Signs on admission: (Temp, Pulse, Resp, and BP)  ·   · Weight in kilograms:     ALL Admissions:  09/18/20 0401    98 7 °F (37 1 °C)    98    24Abnormal      131/78    --    --    --    Lying    09/18/20 0000    98 2 °F (36 8 °C)    64    17    131/72    --    97 %    None (Room air)    Lyi        Admission Orders and Other Orders written within the first 24 hrs after admission  Meds Name, dose, route, time, how may doses given:    finasteride, 5 mg, Oral, Daily  nicotine, 1 patch, Transdermal, Daily  sucralfate, 1 g, Oral, 4x Daily (AC & HS)  Tylenol 650 mg po q6 ( started 9/18 @ 22:57)   Solumedrol 80 mg iv once- 9/19 x 1  Solumedrol 40 mg iv q12 - started 9/20       PRN Meds Name, dose, route, time, how many doses given within the first 24 hrs :    Morphine 2 mg IV 9/17 x 3 - 9/18 x 3 - 9/19 x 4 - 9/20 x 3 - 9/21 x 2    Morphine 1 mg iv prn - 9/19 x 1 - 9/20 x 1 - 9/21 x 1   fentanyl citrate (PF), 50 mcg, Intravenous, Q2H PRN 9/17 x 3  9/18 x 5  ondansetron, 4 mg, Intravenous, Q6H PRN 9/17 x1  9/18  x 3  - 9/19 x 2   Oxycodone 5 mg  Po prn - 9/18 x 2   Robaxin - 500 mg po prn - 9/19 x 2 - 9/20 x 1   Viscious Lidocaine - 9/20 x 1 - 9/21 x 1        IVs Type, rate, and total amt  Ordered/given:    pantoprozole (PROTONIX) infusion (Continuous), 8 mg/hr, Intravenous, Continuous  dextrose 5 % and sodium chloride 0 9 % infusion   Rate: 75 mL/hr Dose: 75 mL/hr  Freq: Continuous Route: IV  Last Dose: Stopped (09/18/20 1858)  Start: 09/18/20 1115 End: 09/18/20 1805    Labs, imaging, other:  CT Chest  - 9/19 - There is mild emphysema  There is a 2 mm left upper lobe nodule (series 2 image 27 )  This has been stable since 2016, therefore is benign  There are no new pulmonary nodules  XR ABD - 9/20  - unremarkable     Consults and findings: Ortho 519 -   27-year-old male with back pain  His primary complaint is midline abdominal pain and a burning sensation in his chest   He denies any motor or sensory deficits     He denies any significant changes in bowel or bladder habits      Physical exam:  5/5 bilateral lower extremity muscle groups, hip flexion, hip extension, knee flexion, knee extension, ankle dorsiflexion, plantar flexion, EHL FHL motor function  Extremities appear warm well perfused  No particularly significant radiculopathy with straight leg raises bilaterally     Plan:  Physical therapy for core strengthening exercises, pain medication per primary team, may benefit from muscle relaxers and Medrol Dosepak      Test Results after admission  Pertinent Lab tests (dates/results):  Culture results (blood, urine, spinal, wound, respiratory, etc ):  Imaging tests (dates/results):  EKG (dates/results): Other test (dates/results):  Tests pending (dates/results):    Surgical or Invasive Procedures  Name of surgery/procedure: EGD  Date & Time: 9/18/20 - 15:20  Patient Response: tolerated   Post-operative orders: Same   Operative Report/Findings: Multiple antral erosions and ulceration secondary to nonsteroidal   anti-inflammatory induced injury; biopsies obtained to rule out Helicobacter pylori   RECOMMENDATION:  1  Pantoprazole 40 mg p o  B i d  Half an hour before breakfast and supper time   2  Sucralfate slurry 1 g p o  Q i d    3  Resume previous diet    Response to Treatment, Major Change in Condition, Major Charge in Treatment anytime during admission  Hospital Course:      Donovan Boeck is a 60 y o  male patient who originally presented to the hospital on 9/16/2020 due to intractable back pain that started a couple of weeks ago and abdominal pain  Since the 1st day, patient has been having intractable pain requiring high doses of IV morphine  Unfortunately, patient was not able to take p o  Medications due to extreme pain in his abdomen  Patient was seen by the GI and they proceeded with EGD which revealed multiple erosions in his stomach which is typical for anti inflammatory medications induced injury    Orthopedics have also seen the patient and they recommend supportive treatment for back pain  No significant abnormalities were noted on the x-ray of the lumbar and thoracic spine  I have provided the patient with the several doses of IV Solu-Medrol, which provided temporary relief  Patient will continue with gabapentin, Lidoderm patches, oxycodone and Robaxin  There was an incident yesterday in the evening when patient had intractable pain and threatened to kill himself, however, he denied any plan of suicide and he stated that he only feels like that during the severe pain attack  Disposition on Discharge  Home, Rehab, SNF, LTC, Shelter, etc : Home/Self Care    Cease to Breathe (CTB)  If a patient expires during an admission, in addition to the above information, please include:    Summary/timeline of the patient's decline in condition:    Medications and treatment:    Patient response to treatment:    Date and time patient ceased to breathe:        Is there a Readmission that follows this admission? X Yes  No    If yes, provide dates: 10/2-10/6          InterQual Review    InterQual Criteria Met:  Yes X No  N/A        Please include the InterQual Review, InterQual year/version used, and the criteria selected:   Created Using Review Status Review Entered   Pix[employer]  In Primary 9/17/2020 15:39       Criteria Set Name - Subset   LOC:Acute Adult-Dehydration or Gastroenteritis      Criteria Review   REVIEW SUMMARY     Patient: [facility] IN THE [facility]\""  Review Number: [id number]  Review Status: In Primary  Criteria Status: Not Met     Condition Specific: Yes        OUTCOMES  Outcome Type: Primary           REVIEW DETAILS     Product: Kell Vasquez Adult  Subset: Dehydration or Gastroenteritis      (Symptom or finding within 24h)         (Excludes PO medications unless noted)     Version: Pixability 2019 1  Quentin Diaz and Pixability  © 2019 [employer] and/or one of its [place]  All Rights Reserved    CPT only © 2018 [employer]  All Rights Reserved  PLEASE SUBMIT THE COMPLETED FORM TO THE DEPARTMENT OF HUMAN SERVICES - DIVISION OF CLINICAL  REVIEW VIA FAX -[phone] or VIA E-MAIL TO [email or web address]    Signature: Yvon Fair Date:  05/28/21    Confidentiality Notice: The documents accompanying this telecopy may contain confidential information belonging to the sender  The information is intended only for the use of the individual named above  If you are not the intended recipient, you are hereby notified  That any disclosure, copying, distribution or taking of any telecopy is strictly prohibited

## 2021-07-14 DIAGNOSIS — N40.0 BENIGN PROSTATIC HYPERPLASIA, UNSPECIFIED WHETHER LOWER URINARY TRACT SYMPTOMS PRESENT: ICD-10-CM

## 2021-07-14 RX ORDER — FINASTERIDE 5 MG/1
TABLET, FILM COATED ORAL
Qty: 90 TABLET | Refills: 0 | Status: SHIPPED | OUTPATIENT
Start: 2021-07-14 | End: 2021-11-01

## 2021-09-13 ENCOUNTER — OFFICE VISIT (OUTPATIENT)
Dept: INTERNAL MEDICINE CLINIC | Facility: CLINIC | Age: 66
End: 2021-09-13
Payer: MEDICARE

## 2021-09-13 VITALS
SYSTOLIC BLOOD PRESSURE: 102 MMHG | HEART RATE: 60 BPM | OXYGEN SATURATION: 98 % | WEIGHT: 146 LBS | TEMPERATURE: 97.5 F | HEIGHT: 69 IN | BODY MASS INDEX: 21.62 KG/M2 | DIASTOLIC BLOOD PRESSURE: 66 MMHG

## 2021-09-13 DIAGNOSIS — R06.02 SHORTNESS OF BREATH: Primary | ICD-10-CM

## 2021-09-13 DIAGNOSIS — R35.1 BPH ASSOCIATED WITH NOCTURIA: ICD-10-CM

## 2021-09-13 DIAGNOSIS — N40.1 BPH ASSOCIATED WITH NOCTURIA: ICD-10-CM

## 2021-09-13 PROCEDURE — 99213 OFFICE O/P EST LOW 20 MIN: CPT | Performed by: INTERNAL MEDICINE

## 2021-09-13 RX ORDER — TAMSULOSIN HYDROCHLORIDE 0.4 MG/1
0.4 CAPSULE ORAL
Qty: 30 CAPSULE | Refills: 5 | Status: SHIPPED | OUTPATIENT
Start: 2021-09-13 | End: 2022-02-14 | Stop reason: SDUPTHER

## 2021-09-13 NOTE — PROGRESS NOTES
Assessment/Plan: This may be a mild vertigo or his constipation acting up his reflux  Suggested taking Pepcid for a week  Continue the medicine for constipation  Also long discussion with him regarding the COVID vaccine  He then also asked about COPD and whether there are any other medicines besides inhalers  Now that he has insurance, will start with PFTs  Would like to make sure that he has COPD verses his breathing symptoms are related to reflux  Quality Measures:       No follow-ups on file  No problem-specific Assessment & Plan notes found for this encounter  Diagnoses and all orders for this visit:    Shortness of breath  -     Complete PFT with post bronchodilator; Future    BPH associated with nocturia  -     tamsulosin (FLOMAX) 0 4 mg; Take 1 capsule (0 4 mg total) by mouth daily with dinner          Subjective:      Patient ID: Christine Bledsoe is a 73 y o  male  Patient comes in today complaining of episodes of nausea, especially with changes in position  No vertigo  His ears are not bothering him  But he does note that his constipation started acting up again  He started taking MiraLax again and feels better today  Nausea seem to worsen as the day progresses  He is not complaining of any acid reflux but is not taking those medicines anymore  Before this, he was overall feeling pretty good        ALLERGIES:  Allergies   Allergen Reactions    Other Anaphylaxis     Shellfish, shrimp    Dilaudid [Hydromorphone]        CURRENT MEDICATIONS:    Current Outpatient Medications:     aspirin (ECOTRIN LOW STRENGTH) 81 mg EC tablet, Take 81 mg by mouth daily, Disp: , Rfl:     DULoxetine (CYMBALTA) 30 mg delayed release capsule, Take 30 mg by mouth daily, Disp: , Rfl:     finasteride (PROSCAR) 5 mg tablet, TAKE ONE TABLET BY MOUTH ONCE DAILY , Disp: 90 tablet, Rfl: 0    tamsulosin (FLOMAX) 0 4 mg, Take 1 capsule (0 4 mg total) by mouth daily with dinner, Disp: 30 capsule, Rfl: 5    polyethylene glycol (MIRALAX) 17 g packet, Take 17 g by mouth 2 (two) times a day for 14 days (Patient not taking: Reported on 10/15/2020), Disp: 476 g, Rfl: 0    ACTIVE PROBLEM LIST:  Patient Active Problem List   Diagnosis    Ureterocele    BPH associated with nocturia    Epigastric pain    Nicotine dependence    Acute back pain    Severe protein-calorie malnutrition (HCC)    History of solitary pulmonary nodule    Acute abdominal pain    Nutcracker phenomenon of renal vein    Acute gastric ulcer without hemorrhage or perforation    Lumbar disc disease    Dyspepsia    SOB (shortness of breath)    Chronic abdominal pain       PAST MEDICAL HISTORY:  Past Medical History:   Diagnosis Date    Anemia     Atrial fibrillation (HCC)     COPD (chronic obstructive pulmonary disease) (HCC)     Hernia cerebri (HCC)     Hyperlipidemia     Lung nodule     RESOLVED 26 JUN 2016    Renal calculi     Sexual dysfunction        PAST SURGICAL HISTORY:  Past Surgical History:   Procedure Laterality Date    FACIAL COSMETIC SURGERY      pt has metal hardware throughout face    FACIAL COSMETIC SURGERY      KIDNEY SURGERY      ORBITAL FRACTURE SURGERY      CLOSED TREATMENT OF ORBITAL FRACTURE(NON-BLOWOUT)    TONSILLECTOMY      VASECTOMY         FAMILY HISTORY:  Family History   Problem Relation Age of Onset    Stroke Mother     Diabetes Mother     Hypertension Mother     Skin cancer Mother     Heart disease Mother     Scoliosis Sister        SOCIAL HISTORY:  Social History     Socioeconomic History    Marital status: /Civil Union     Spouse name: Not on file    Number of children: Not on file    Years of education: Not on file    Highest education level: Not on file   Occupational History    Not on file   Tobacco Use    Smoking status: Current Every Day Smoker     Packs/day: 1 00     Types: Cigarettes    Smokeless tobacco: Former User     Quit date: 9/17/2020   Vaping Use    Vaping Use: Never used   Substance and Sexual Activity    Alcohol use: Never    Drug use: No    Sexual activity: Not on file   Other Topics Concern    Not on file   Social History Narrative    Not on file     Social Determinants of Health     Financial Resource Strain:     Difficulty of Paying Living Expenses:    Food Insecurity:     Worried About Running Out of Food in the Last Year:     [address] in the Last Year:    Transportation Needs:     Lack of Transportation (Medical):  Lack of Transportation (Non-Medical):    Physical Activity:     Days of Exercise per Week:     Minutes of Exercise per Session:    Stress:     Feeling of Stress :    Social Connections:     Frequency of Communication with Friends and Family:     Frequency of Social Gatherings with Friends and Family:     Attends Synagogue Services:     Active Member of Clubs or Organizations:     Attends Club or Organization Meetings:     Marital Status:    Intimate Partner Violence:     Fear of Current or Ex-Partner:     Emotionally Abused:     Physically Abused:     Sexually Abused:        Review of Systems   Respiratory: Negative for shortness of breath  Cardiovascular: Negative for chest pain  Gastrointestinal: Positive for constipation  Negative for abdominal pain  Objective:  Vitals:    09/13/21 0801   BP: 102/66   BP Location: Left arm   Patient Position: Sitting   Cuff Size: Adult   Pulse: 60   Temp: 97 5 °F (36 4 °C)   TempSrc: Tympanic   SpO2: 98%   Weight: 66 2 kg (146 lb)   Height: 5' 9" (1 753 m)     Body mass index is 21 56 kg/m²  Physical Exam  Vitals and nursing note reviewed  Constitutional:       Appearance: Normal appearance  Abdominal:      General: Abdomen is flat  Bowel sounds are normal  There is no distension  Tenderness: There is no abdominal tenderness  Neurological:      Mental Status: He is alert             RESULTS:    No results found for this or any previous visit (from the past 1008 hour(s))  This note was created with voice recognition software  Phonic, grammatical and spelling errors may be present within the note as a result

## 2021-10-04 ENCOUNTER — HOSPITAL ENCOUNTER (OUTPATIENT)
Dept: PULMONOLOGY | Facility: HOSPITAL | Age: 66
Discharge: HOME | End: 2021-10-04
Attending: INTERNAL MEDICINE
Payer: MEDICARE

## 2021-10-04 DIAGNOSIS — R06.02 SHORTNESS OF BREATH: ICD-10-CM

## 2021-10-04 PROCEDURE — 94727 GAS DIL/WSHOT DETER LNG VOL: CPT

## 2021-10-04 PROCEDURE — 94729 DIFFUSING CAPACITY: CPT

## 2021-10-04 PROCEDURE — 94729 DIFFUSING CAPACITY: CPT | Performed by: INTERNAL MEDICINE

## 2021-10-04 PROCEDURE — 94727 GAS DIL/WSHOT DETER LNG VOL: CPT | Performed by: INTERNAL MEDICINE

## 2021-10-04 PROCEDURE — 94060 EVALUATION OF WHEEZING: CPT | Performed by: INTERNAL MEDICINE

## 2021-10-04 PROCEDURE — 94060 EVALUATION OF WHEEZING: CPT

## 2021-10-04 PROCEDURE — 94760 N-INVAS EAR/PLS OXIMETRY 1: CPT

## 2021-10-04 RX ORDER — ALBUTEROL SULFATE 2.5 MG/3ML
2.5 SOLUTION RESPIRATORY (INHALATION) ONCE
Status: COMPLETED | OUTPATIENT
Start: 2021-10-04 | End: 2021-10-04

## 2021-10-04 RX ADMIN — ALBUTEROL SULFATE 2.5 MG: 2.5 SOLUTION RESPIRATORY (INHALATION) at 09:04

## 2022-02-14 DIAGNOSIS — N40.1 BPH ASSOCIATED WITH NOCTURIA: ICD-10-CM

## 2022-02-14 DIAGNOSIS — R35.1 BPH ASSOCIATED WITH NOCTURIA: ICD-10-CM

## 2022-02-14 RX ORDER — TAMSULOSIN HYDROCHLORIDE 0.4 MG/1
0.4 CAPSULE ORAL
Qty: 30 CAPSULE | Refills: 2 | Status: SHIPPED | OUTPATIENT
Start: 2022-02-14 | End: 2022-05-20

## 2022-08-26 ENCOUNTER — OFFICE VISIT (OUTPATIENT)
Dept: INTERNAL MEDICINE CLINIC | Facility: CLINIC | Age: 67
End: 2022-08-26
Payer: MEDICARE

## 2022-08-26 ENCOUNTER — TELEPHONE (OUTPATIENT)
Dept: ADMINISTRATIVE | Facility: OTHER | Age: 67
End: 2022-08-26

## 2022-08-26 VITALS
OXYGEN SATURATION: 97 % | BODY MASS INDEX: 20.73 KG/M2 | HEART RATE: 63 BPM | HEIGHT: 69 IN | WEIGHT: 140 LBS | SYSTOLIC BLOOD PRESSURE: 94 MMHG | TEMPERATURE: 98.4 F | DIASTOLIC BLOOD PRESSURE: 58 MMHG | RESPIRATION RATE: 16 BRPM

## 2022-08-26 DIAGNOSIS — L72.3 INFECTED SEBACEOUS CYST: Primary | ICD-10-CM

## 2022-08-26 DIAGNOSIS — L08.9 INFECTED SEBACEOUS CYST: Primary | ICD-10-CM

## 2022-08-26 PROCEDURE — 99214 OFFICE O/P EST MOD 30 MIN: CPT | Performed by: INTERNAL MEDICINE

## 2022-08-26 RX ORDER — CEPHALEXIN 250 MG/1
250 CAPSULE ORAL 3 TIMES DAILY
Qty: 21 CAPSULE | Refills: 0 | Status: SHIPPED | OUTPATIENT
Start: 2022-08-26 | End: 2022-09-02

## 2022-08-26 NOTE — PROGRESS NOTES
Assessment/Plan:     Treat with antibiotics  Warm compresses  Referral to surgery to definitive treatment  Quality Measures:       Return if symptoms worsen or fail to improve  No problem-specific Assessment & Plan notes found for this encounter  Diagnoses and all orders for this visit:    Infected sebaceous cyst  -     cephalexin (KEFLEX) 250 mg capsule; Take 1 capsule (250 mg total) by mouth 3 (three) times a day for 7 days  -     Ambulatory Referral to General Surgery; Future        Subjective:      Patient ID: Hilda Miguel is a 78 y o  male  Patient comes in today complaining of a swollen, painful area on the back of his left shoulder that has actually been there for a while  Only recently has it become uncomfortable        ALLERGIES:  Allergies   Allergen Reactions    Other Anaphylaxis     Shellfish, shrimp    Dilaudid [Hydromorphone]        CURRENT MEDICATIONS:    Current Outpatient Medications:     aspirin (ECOTRIN LOW STRENGTH) 81 mg EC tablet, Take 81 mg by mouth daily, Disp: , Rfl:     cephalexin (KEFLEX) 250 mg capsule, Take 1 capsule (250 mg total) by mouth 3 (three) times a day for 7 days, Disp: 21 capsule, Rfl: 0    DULoxetine (CYMBALTA) 30 mg delayed release capsule, Take 30 mg by mouth daily, Disp: , Rfl:     finasteride (PROSCAR) 5 mg tablet, TAKE ONE TABLET BY MOUTH ONCE DAILY , Disp: 90 tablet, Rfl: 3    polyethylene glycol (MIRALAX) 17 g packet, Take 17 g by mouth 2 (two) times a day for 14 days, Disp: 476 g, Rfl: 0    tamsulosin (FLOMAX) 0 4 mg, TAKE ONE CAPSULE BY MOUTH EVERY DAY WITH dinner, Disp: 30 capsule, Rfl: 5    ACTIVE PROBLEM LIST:  Patient Active Problem List   Diagnosis    Ureterocele    BPH associated with nocturia    Epigastric pain    Nicotine dependence    Acute back pain    Severe protein-calorie malnutrition (HCC)    History of solitary pulmonary nodule    Acute abdominal pain    Nutcracker phenomenon of renal vein    Acute gastric ulcer without hemorrhage or perforation    Lumbar disc disease    Dyspepsia    Shortness of breath    Chronic abdominal pain       PAST MEDICAL HISTORY:  Past Medical History:   Diagnosis Date    Anemia     Atrial fibrillation (HCC)     COPD (chronic obstructive pulmonary disease) (HCC)     Hernia cerebri (HCC)     Hyperlipidemia     Lung nodule     RESOLVED 26 JUN 2016    Renal calculi     Sexual dysfunction        PAST SURGICAL HISTORY:  Past Surgical History:   Procedure Laterality Date    FACIAL COSMETIC SURGERY      pt has metal hardware throughout face    FACIAL COSMETIC SURGERY      KIDNEY SURGERY      ORBITAL FRACTURE SURGERY      CLOSED TREATMENT OF ORBITAL FRACTURE(NON-BLOWOUT)    TONSILLECTOMY      VASECTOMY         FAMILY HISTORY:  Family History   Problem Relation Age of Onset    Stroke Mother     Diabetes Mother     Hypertension Mother     Skin cancer Mother     Heart disease Mother     Scoliosis Sister        SOCIAL HISTORY:  Social History     Socioeconomic History    Marital status: /Civil Union     Spouse name: Not on file    Number of children: Not on file    Years of education: Not on file    Highest education level: Not on file   Occupational History    Not on file   Tobacco Use    Smoking status: Current Every Day Smoker     Packs/day: 1 00     Types: Cigarettes    Smokeless tobacco: Former User     Quit date: 9/17/2020   Vaping Use    Vaping Use: Never used   Substance and Sexual Activity    Alcohol use: Never    Drug use: No    Sexual activity: Not on file   Other Topics Concern    Not on file   Social History Narrative    Not on file     Social Determinants of Health     Financial Resource Strain: Not on file   Food Insecurity: Not on file   Transportation Needs: Not on file   Physical Activity: Not on file   Stress: Not on file   Social Connections: Not on file   Intimate Partner Violence: Not on file   Housing Stability: Not on file       Review of Systems   Constitutional: Negative for fever  Objective:  Vitals:    08/26/22 1129   BP: 94/58   BP Location: Left arm   Patient Position: Sitting   Cuff Size: Adult   Pulse: 63   Resp: 16   Temp: 98 4 °F (36 9 °C)   TempSrc: Tympanic   SpO2: 97%   Weight: 63 5 kg (140 lb)   Height: 5' 9" (1 753 m)     Body mass index is 20 67 kg/m²  Physical Exam  Vitals and nursing note reviewed  Constitutional:       Appearance: Normal appearance  Skin:     Comments:   Infected sebaceous cyst left posterior shoulder  No fluctuant mass  No drainage  Neurological:      Mental Status: He is alert  RESULTS:    No results found for this or any previous visit (from the past 1008 hour(s))  This note was created with voice recognition software  Phonic, grammatical and spelling errors may be present within the note as a result

## 2022-08-26 NOTE — TELEPHONE ENCOUNTER
----- Message from Kimberli Benites LPN sent at [phone]/61/8253  2:15 PM EDT -----  Regarding: Hep C Screening  08/25/22 2:15 PM    Hello, our patient Valentina Perla has had Hepatitis C completed/performed  Please assist in updating the patient chart by pulling the Care Everywhere (CE) document  The date of service is 09/24/2020       Thank you,  Kimberli Benites LPN  [facility] [address]

## 2022-08-26 NOTE — TELEPHONE ENCOUNTER
Upon review of the In Basket request we were able to locate, review, and update the patient chart as requested for Hepatitis C   Any additional questions or concerns should be emailed to the Practice Liaisons via [email or web address] email, please do not reply via In Basket      Thank you  Aneesh Cabrera [place]

## 2022-09-01 ENCOUNTER — OFFICE VISIT (OUTPATIENT)
Dept: INTERNAL MEDICINE CLINIC | Facility: CLINIC | Age: 67
End: 2022-09-01
Payer: MEDICARE

## 2022-09-01 VITALS
TEMPERATURE: 97.2 F | OXYGEN SATURATION: 99 % | HEIGHT: 69 IN | DIASTOLIC BLOOD PRESSURE: 60 MMHG | WEIGHT: 138 LBS | SYSTOLIC BLOOD PRESSURE: 100 MMHG | RESPIRATION RATE: 14 BRPM | HEART RATE: 64 BPM | BODY MASS INDEX: 20.44 KG/M2

## 2022-09-01 DIAGNOSIS — N40.1 BPH ASSOCIATED WITH NOCTURIA: ICD-10-CM

## 2022-09-01 DIAGNOSIS — R35.1 BPH ASSOCIATED WITH NOCTURIA: ICD-10-CM

## 2022-09-01 DIAGNOSIS — Z13.6 SCREENING FOR CARDIOVASCULAR CONDITION: ICD-10-CM

## 2022-09-01 DIAGNOSIS — Z12.5 SCREENING FOR PROSTATE CANCER: ICD-10-CM

## 2022-09-01 DIAGNOSIS — L08.9 INFECTED SEBACEOUS CYST: ICD-10-CM

## 2022-09-01 DIAGNOSIS — L72.3 INFECTED SEBACEOUS CYST: ICD-10-CM

## 2022-09-01 DIAGNOSIS — Z00.00 MEDICARE ANNUAL WELLNESS VISIT, INITIAL: Primary | ICD-10-CM

## 2022-09-01 DIAGNOSIS — Z13.1 SCREENING FOR DIABETES MELLITUS: ICD-10-CM

## 2022-09-01 PROBLEM — R06.02 SHORTNESS OF BREATH: Status: RESOLVED | Noted: 2020-10-02 | Resolved: 2022-09-01

## 2022-09-01 PROBLEM — R10.9 ACUTE ABDOMINAL PAIN: Status: RESOLVED | Noted: 2020-09-20 | Resolved: 2022-09-01

## 2022-09-01 PROCEDURE — 99213 OFFICE O/P EST LOW 20 MIN: CPT | Performed by: INTERNAL MEDICINE

## 2022-09-01 PROCEDURE — G0438 PPPS, INITIAL VISIT: HCPCS | Performed by: INTERNAL MEDICINE

## 2022-09-01 NOTE — PATIENT INSTRUCTIONS
Medicare Preventive Visit Patient Instructions  Thank you for completing your Welcome to Medicare Visit or Medicare Annual Wellness Visit today  Your next wellness visit will be due in one year (9/2/2023)  The screening/preventive services that you may require over the next 5-10 years are detailed below  Some tests may not apply to you based off risk factors and/or age  Screening tests ordered at today's visit but not completed yet may show as past due  Also, please note that scanned in results may not display below  Preventive Screenings:  Service Recommendations Previous Testing/Comments   Colorectal Cancer Screening  · Colonoscopy    · Fecal Occult Blood Test (FOBT)/Fecal Immunochemical Test (FIT)  · Fecal DNA/Cologuard Test  · Flexible Sigmoidoscopy Age: 39-70 years old   Colonoscopy: every 10 years (May be performed more frequently if at higher risk)  OR  FOBT/FIT: every 1 year  OR  Cologuard: every 3 years  OR  Sigmoidoscopy: every 5 years  Screening may be recommended earlier than age 39 if at higher risk for colorectal cancer  Also, an individualized decision between you and your healthcare provider will decide whether screening between the ages of 74-80 would be appropriate   Colonoscopy: 10/05/2020  FOBT/FIT: Not on file  Cologuard: Not on file  Sigmoidoscopy: Not on file          Prostate Cancer Screening Individualized decision between patient and health care provider in men between ages of 53-78   Medicare will cover every 12 months beginning on the day after your 50th birthday PSA: 0 7 ng/mL           Hepatitis C Screening Once for adults born between 1945 and 1965  More frequently in patients at high risk for Hepatitis C Hep C Antibody: 09/24/2020        Diabetes Screening 1-2 times per year if you're at risk for diabetes or have pre-diabetes Fasting glucose: 113 mg/dL (10/4/2020)  A1C: No results in last 5 years (No results in last 5 years)      Cholesterol Screening Once every 5 years if you don't have a lipid disorder  May order more often based on risk factors  Lipid panel: Not on file         Other Preventive Screenings Covered by Medicare:  1  Abdominal Aortic Aneurysm (AAA) Screening: covered once if your at risk  You're considered to be at risk if you have a family history of AAA or a male between the age of 73-68 who smoking at least 100 cigarettes in your lifetime  2  Lung Cancer Screening: covers low dose CT scan once per year if you meet all of the following conditions: (1) Age 50-69; (2) No signs or symptoms of lung cancer; (3) Current smoker or have quit smoking within the last 15 years; (4) You have a tobacco smoking history of at least 20 pack years (packs per day x number of years you smoked); (5) You get a written order from a healthcare provider  3  Glaucoma Screening: covered annually if you're considered high risk: (1) You have diabetes OR (2) Family history of glaucoma OR (3)  aged 48 and older OR (3)  American aged 72 and older  3  Osteoporosis Screening: covered every 2 years if you meet one of the following conditions: (1) Have a vertebral abnormality; (2) On glucocorticoid therapy for more than 3 months; (3) Have primary hyperparathyroidism; (4) On osteoporosis medications and need to assess response to drug therapy  5  HIV Screening: covered annually if you're between the age of 12-76  Also covered annually if you are younger than 13 and older than 72 with risk factors for HIV infection  For pregnant patients, it is covered up to 3 times per pregnancy      Immunizations:  Immunization Recommendations   Influenza Vaccine Annual influenza vaccination during flu season is recommended for all persons aged >= 6 months who do not have contraindications   Pneumococcal Vaccine   * Pneumococcal conjugate vaccine = PCV13 (Prevnar 13), PCV15 (Vaxneuvance), PCV20 (Prevnar 20)  * Pneumococcal polysaccharide vaccine = PPSV23 (Pneumovax) Adults 25-60 years old: 1-3 doses may be recommended based on certain risk factors  Adults 72 years old: 1-2 doses may be recommended based off what pneumonia vaccine you previously received   Hepatitis B Vaccine 3 dose series if at intermediate or high risk (ex: diabetes, end stage renal disease, liver disease)   Tetanus (Td) Vaccine - COST NOT COVERED BY MEDICARE PART B Following completion of primary series, a booster dose should be given every 10 years to maintain immunity against tetanus  Td may also be given as tetanus wound prophylaxis  Tdap Vaccine - COST NOT COVERED BY MEDICARE PART B Recommended at least once for all adults  For pregnant patients, recommended with each pregnancy  Shingles Vaccine (Shingrix) - COST NOT COVERED BY MEDICARE PART B  2 shot series recommended in those aged 48 and above     Health Maintenance Due:      Topic Date Due    Colorectal Cancer Screening  10/05/2030    Hepatitis C Screening  Completed     Immunizations Due:      Topic Date Due    COVID-19 Vaccine (1) Never done    Pneumococcal Vaccine: 65+ Years (1 - PCV) Never done    Influenza Vaccine (1) 09/01/2022     Advance Directives   What are advance directives? Advance directives are legal documents that state your wishes and plans for medical care  These plans are made ahead of time in case you lose your ability to make decisions for yourself  Advance directives can apply to any medical decision, such as the treatments you want, and if you want to donate organs  What are the types of advance directives? There are many types of advance directives, and each state has rules about how to use them  You may choose a combination of any of the following:  · Living will: This is a written record of the treatment you want  You can also choose which treatments you do not want, which to limit, and which to stop at a certain time  This includes surgery, medicine, IV fluid, and tube feedings  · Durable power of  for healthcare [place] [facility]):   This is a written record that states who you want to make healthcare choices for you when you are unable to make them for yourself  This person, called a proxy, is usually a family member or a friend  You may choose more than 1 proxy  · Do not resuscitate (DNR) order:  A DNR order is used in case your heart stops beating or you stop breathing  It is a request not to have certain forms of treatment, such as CPR  A DNR order may be included in other types of advance directives  · Medical directive: This covers the care that you want if you are in a coma, near death, or unable to make decisions for yourself  You can list the treatments you want for each condition  Treatment may include pain medicine, surgery, blood transfusions, dialysis, IV or tube feedings, and a ventilator (breathing machine)  · Values history: This document has questions about your views, beliefs, and how you feel and think about life  This information can help others choose the care that you would choose  Why are advance directives important? An advance directive helps you control your care  Although spoken wishes may be used, it is better to have your wishes written down  Spoken wishes can be misunderstood, or not followed  Treatments may be given even if you do not want them  An advance directive may make it easier for your family to make difficult choices about your care  Cigarette Smoking and Your Health   Risks to your health if you smoke:  Nicotine and other chemicals found in tobacco damage every cell in your body  Even if you are a light smoker, you have an increased risk for cancer, heart disease, and lung disease  If you are pregnant or have diabetes, smoking increases your risk for complications  Benefits to your health if you stop smoking:   · You decrease respiratory symptoms such as coughing, wheezing, and shortness of breath     · You reduce your risk for cancers of the lung, mouth, throat, kidney, bladder, pancreas, stomach, and cervix  If you already have cancer, you increase the benefits of chemotherapy  You also reduce your risk for cancer returning or a second cancer from developing  · You reduce your risk for heart disease, blood clots, heart attack, and stroke  · You reduce your risk for lung infections, and diseases such as pneumonia, asthma, chronic bronchitis, and emphysema  · Your circulation improves  More oxygen can be delivered to your body  If you have diabetes, you lower your risk for complications, such as kidney, artery, and eye diseases  You also lower your risk for nerve damage  Nerve damage can lead to amputations, poor vision, and blindness  · You improve your body's ability to heal and to fight infections  For more information and support to stop smoking:   · [employer]  gov  Phone: 1- 411 - 858-7060  Web Address: www [employer]  [facility] [employer] 2018 Information is for End User's use only and may not be sold, redistributed or otherwise used for commercial purposes   All illustrations and images included in CareNotes® are the copyrighted property of A D A M , Inc  or [address] [place] [address]

## 2022-09-01 NOTE — PROGRESS NOTES
Assessment and Plan:     Problem List Items Addressed This Visit        Other    BPH associated with nocturia      Other Visit Diagnoses     Medicare annual wellness visit, initial    -  Primary    Infected sebaceous cyst        Screening for cardiovascular condition        Relevant Orders    Lipid panel    Screening for diabetes mellitus        Relevant Orders    Glucose, fasting    Screening for prostate cancer        Relevant Orders    PSA, Total Screen       chronic problems are stable  Continue his medications for BPH but suggested he might want to add pumpkin seeds     Preventive health issues were discussed with patient, and age appropriate screening tests were ordered as noted in patient's After Visit Summary  Personalized health advice and appropriate referrals for health education or preventive services given if needed, as noted in patient's After Visit Summary  History of Present Illness:     Patient presents for a Medicare Wellness Visit     Patient comes in today for Medicare wellness and follow-up  He does have an appointment with surgery for further management of the cyst   He is taking the antibiotics but forgot about the warm compresses  He continues to take medicine for his BPH  That appears to be doing well  Not happy about being on medicine but it works so he tolerates it  Patient Care Team:  Cayetano Rodríguez MD as PCP - General (Internal Medicine)  MD Cayetano Harris MD Byron Chamorro, MD Lavetta Mai, PA-C as Physician Assistant (Gastroenterology)     Review of Systems:     Review of Systems   Respiratory: Negative for shortness of breath  Cardiovascular: Negative for chest pain  Gastrointestinal: Negative for abdominal pain          Problem List:     Patient Active Problem List   Diagnosis    Ureterocele    BPH associated with nocturia    Epigastric pain    Nicotine dependence    Acute back pain    Severe protein-calorie malnutrition ([address] )    History of solitary pulmonary nodule    Nutcracker phenomenon of renal vein    Acute gastric ulcer without hemorrhage or perforation    Lumbar disc disease    Dyspepsia    Chronic abdominal pain      Past Medical and Surgical History:     Past Medical History:   Diagnosis Date    Anemia     Atrial fibrillation (HCC)     [facility] disease) [facility] [address] [facility]     Hernia cerebri (HCC)     Hyperlipidemia     Lung nodule     RESOLVED 26 JUN 2016    Renal calculi     Sexual dysfunction      Past Surgical History:   Procedure Laterality Date    FACIAL COSMETIC SURGERY      pt has metal hardware throughout face    FACIAL COSMETIC SURGERY      KIDNEY SURGERY      ORBITAL FRACTURE SURGERY      CLOSED TREATMENT OF ORBITAL FRACTURE(NON-BLOWOUT)    TONSILLECTOMY      VASECTOMY        Family History:     Family History   Problem Relation Age of Onset    Stroke Mother     Diabetes Mother     Hypertension Mother     Skin cancer Mother     Heart disease Mother     Scoliosis Sister       Social History:     Social History     Socioeconomic History    Marital status: /Civil Union     Spouse name: None    Number of children: None    Years of education: None    Highest education level: None   Occupational History    None   Tobacco Use    Smoking status: Current Every Day Smoker     Packs/day: 1 00     Types: Cigarettes    Smokeless tobacco: Former User     Quit date: 9/17/2020   Vaping Use    Vaping Use: Never used   Substance and Sexual Activity    Alcohol use: Never    Drug use: No    Sexual activity: None   Other Topics Concern    None   Social History Narrative    None     Social Determinants of Health     Financial Resource Strain: Low Risk     Difficulty of Paying Living Expenses: Not hard at all   Food Insecurity: Not on file   Transportation Needs: No Transportation Needs    Lack of Transportation (Medical): No    Lack of Transportation (Non-Medical):  No   Physical Activity: Not on file   Stress: Not on file   Social Connections: Not on file   Intimate Partner Violence: Not on file   Housing Stability: Not on file      Medications and Allergies:     Current Outpatient Medications   Medication Sig Dispense Refill    aspirin (ECOTRIN LOW STRENGTH) 81 mg EC tablet Take 81 mg by mouth daily      cephalexin (KEFLEX) 250 mg capsule Take 1 capsule (250 mg total) by mouth 3 (three) times a day for 7 days 21 capsule 0    finasteride (PROSCAR) 5 mg tablet TAKE ONE TABLET BY MOUTH ONCE DAILY  90 tablet 3    polyethylene glycol (MIRALAX) 17 g packet Take 17 g by mouth 2 (two) times a day for 14 days 476 g 0    tamsulosin (FLOMAX) 0 4 mg TAKE ONE CAPSULE BY MOUTH EVERY DAY WITH dinner 30 capsule 5     No current facility-administered medications for this visit  Allergies   Allergen Reactions    Other Anaphylaxis     Shellfish, shrimp    Dilaudid [Hydromorphone]       Immunizations:     Immunization History   Administered Date(s) Administered    INFLUENZA 10/20/2008    Influenza, seasonal, injectable 10/20/2008      Health Maintenance:         Topic Date Due    Colorectal Cancer Screening  10/05/2030    Hepatitis C Screening  Completed         Topic Date Due    COVID-19 Vaccine (1) Never done    Pneumococcal Vaccine: 65+ Years (1 - PCV) Never done    Influenza Vaccine (1) 09/01/2022      Medicare Screening Tests and Risk Assessments:     Nicolas Ernandezill is here for his Initial Wellness visit  Health Risk Assessment:   Patient rates overall health as excellent  Patient feels that their physical health rating is same  Patient is satisfied with their life  Eyesight was rated as same  Hearing was rated as same  Patient feels that their emotional and mental health rating is same  Patients states they are never, rarely angry  Patient states they are never, rarely unusually tired/fatigued  Pain experienced in the last 7 days has been some   Patient states that he has experienced no weight loss or gain in last 6 months  Upper part of back - cyst    Depression Screening:   PHQ-2 Score: 0      Fall Risk Screening: In the past year, patient has experienced: no history of falling in past year      Home Safety:  Patient does not have trouble with stairs inside or outside of their home  Patient has working smoke alarms and has working carbon monoxide detector  Home safety hazards include: none  Nutrition:   Current diet is Regular and Limited junk food  Medications:   Patient is currently taking over-the-counter supplements  OTC medications include: Vitamins  Patient is able to manage medications  Activities of Daily Living (ADLs)/Instrumental Activities of Daily Living (IADLs):   Walk and transfer into and out of bed and chair?: Yes  Dress and groom yourself?: Yes    Bathe or shower yourself?: Yes    Feed yourself?  Yes  Do your laundry/housekeeping?: Yes  Manage your money, pay your bills and track your expenses?: Yes  Make your own meals?: Yes    Do your own shopping?: Yes    Previous Hospitalizations:   Any hospitalizations or ED visits within the last 12 months?: No      Advance Care Planning:   Living will: No    Advanced directive: No    Advanced directive counseling given: Yes      Cognitive Screening:   Provider or family/friend/caregiver concerned regarding cognition?: No    PREVENTIVE SCREENINGS      Cardiovascular Screening:    General: Risks and Benefits Discussed      Diabetes Screening:     General: Risks and Benefits Discussed      Colorectal Cancer Screening:     General: Screening Current      Prostate Cancer Screening:    General: Risks and Benefits Discussed      Osteoporosis Screening:    General: Screening Not Indicated      Abdominal Aortic Aneurysm (AAA) Screening:    Risk factors include: age between 73-69 yo and tobacco use        General: Screening Not Indicated      Lung Cancer Screening:     General: Screening Not Indicated      Hepatitis C Screening: General: Screening Current    Screening, Brief Intervention, and Referral to Treatment (SBIRT)    Screening      AUDIT-C Screenin) How often did you have a drink containing alcohol in the past year? never  2) How many drinks did you have on a typical day when you were drinking in the past year? 0  3) How often did you have 6 or more drinks on one occasion in the past year? never    AUDIT-C Score: 0  Interpretation: Score 0-3 (male): Negative screen for alcohol misuse    Single Item Drug Screening:  How often have you used an illegal drug (including marijuana) or a prescription medication for non-medical reasons in the past year? never    Single Item Drug Screen Score: 0  Interpretation: Negative screen for possible drug use disorder    Brief Intervention  Alcohol & drug use screenings were reviewed  No concerns regarding substance use disorder identified  No exam data present     Physical Exam:     /60 (BP Location: Left arm, Patient Position: Sitting, Cuff Size: Adult)   Pulse 64   Temp (!) 97 2 °F (36 2 °C) (Tympanic)   Resp 14   Ht 5' 9" (1 753 m)   Wt 62 6 kg (138 lb)   SpO2 99%   BMI 20 38 kg/m²     Physical Exam  Vitals and nursing note reviewed  Cardiovascular:      Rate and Rhythm: Normal rate and regular rhythm  Pulmonary:      Effort: Pulmonary effort is normal       Breath sounds: Normal breath sounds  Abdominal:      General: Abdomen is flat  Tenderness: There is no abdominal tenderness  Musculoskeletal:      Right lower leg: No edema  Left lower leg: No edema  Neurological:      Mental Status: He is alert and oriented to person, place, and time  Psychiatric:         Behavior: Behavior normal          Thought Content:  Thought content normal          Judgment: Judgment normal           Zeb Brennan MD

## 2022-09-09 ENCOUNTER — CONSULT (OUTPATIENT)
Dept: SURGERY | Facility: CLINIC | Age: 67
End: 2022-09-09
Payer: MEDICARE

## 2022-09-09 VITALS
SYSTOLIC BLOOD PRESSURE: 110 MMHG | HEIGHT: 69 IN | DIASTOLIC BLOOD PRESSURE: 68 MMHG | WEIGHT: 140 LBS | HEART RATE: 64 BPM | BODY MASS INDEX: 20.73 KG/M2

## 2022-09-09 DIAGNOSIS — L08.9 INFECTED SEBACEOUS CYST: ICD-10-CM

## 2022-09-09 DIAGNOSIS — E43 SEVERE PROTEIN-CALORIE MALNUTRITION (HCC): ICD-10-CM

## 2022-09-09 DIAGNOSIS — E86.0 DEHYDRATION: ICD-10-CM

## 2022-09-09 DIAGNOSIS — L72.3 INFECTED SEBACEOUS CYST: ICD-10-CM

## 2022-09-09 PROCEDURE — 99203 OFFICE O/P NEW LOW 30 MIN: CPT | Performed by: SURGERY

## 2022-09-09 RX ORDER — SODIUM CHLORIDE, SODIUM LACTATE, POTASSIUM CHLORIDE, CALCIUM CHLORIDE 600; 310; 30; 20 MG/100ML; MG/100ML; MG/100ML; MG/100ML
125 INJECTION, SOLUTION INTRAVENOUS
Status: CANCELLED | OUTPATIENT
Start: 2022-09-09 | End: 2022-09-10

## 2022-09-09 NOTE — PATIENT INSTRUCTIONS
Epidermal Inclusion Cysts   WHAT YOU NEED TO KNOW:   What are epidermal inclusion cysts? Epidermal inclusion cysts are the most common skin cysts in adults  These cysts are usually round, firm lumps filled with a cheese-like material called keratin  They are also called epidermoid, keratin, or sebaceous cysts  They can be found almost anywhere on your body  The cysts are most common on the face, back, neck, chest, and around your ears  They can be caused by blocked hair follicle and oil gland ducts in your skin  Epidermal inclusion cysts may grow slowly but are not cancerous  How are the cysts treated? Treatment is not needed if you have no symptoms  The cysts can be opened and drained if the cysts become infected or cause problems  The cysts can grow larger and make it hard for you to sit or walk if they are on your legs or back  You may also need antibiotics if there is an infection  You may need surgery to remove the cyst completely  When should I contact my healthcare provider? Your cyst becomes swollen, red, and painful  Your cyst is large and leads to trouble moving or a deformed area  You have questions or concerns about your condition or care

## 2022-09-09 NOTE — PROGRESS NOTES
Consult- General Surgery   Maikel Vail 78 y o  male MRN: [id number]  Unit/Bed#:  Encounter: [id number]    Assessment/Plan     Assessment:  Infected sebaceous cyst from back  History of COPD  History of atrial fibrillation  History of hyperlipidemia  Plan:  The patient has a 3 cm sebaceous cyst on the back, to the left of the midline and closed this left shoulder, there is evidence of previous infection, minimal irritation at this time  I advised the patient to undergo excision of sebaceous cyst from back in the near future  I discussed the operative procedure, risks, benefits, alternatives and possible complications, he understood and agreed to proceed  History of Present Illness     HPI:  Maikel Vail is a 78 y o  male who presents to my office for evaluation of infected sebaceous cyst   The patient noted a lump on the left upper back for quite some time, subsequently he noticed pain, discomfort and drainage couple weeks ago for which he went to see his primary care physician  The patient was prescribed antibiotics and the symptoms improved  In addition he was referred to us for surgical evaluation  He had no prior symptoms in the past and no prior history of infected sebaceous cyst   He does not recall any single event the provoked infection  Review of Systems: The rest of the review of system total of 10 were negative except for the HPI      Historical Information   Past Medical History:   Diagnosis Date    Anemia     Atrial fibrillation ([address]     COPD (chronic obstructive pulmonary disease) ([address]     Hernia cerebri (HCC)     Hyperlipidemia     Lung nodule     RESOLVED 26 JUN 2016    Renal calculi     Sexual dysfunction      Past Surgical History:   Procedure Laterality Date    FACIAL COSMETIC SURGERY      pt has metal hardware throughout face    FACIAL COSMETIC SURGERY      KIDNEY SURGERY      ORBITAL FRACTURE SURGERY      CLOSED TREATMENT OF ORBITAL FRACTURE(NON-BLOWOUT)    TONSILLECTOMY      VASECTOMY       Social History   Social History     Substance and Sexual Activity   Alcohol Use Never     Social History     Substance and Sexual Activity   Drug Use No     Social History     Tobacco Use   Smoking Status Current Every Day Smoker    Packs/day: 1 00    Types: Cigarettes   Smokeless Tobacco Former User    Quit date: 9/17/2020     Family History: non-contributory    Meds/Allergies   all medications and allergies reviewed     Current Outpatient Medications:     aspirin (ECOTRIN LOW STRENGTH) 81 mg EC tablet, Take 81 mg by mouth daily, Disp: , Rfl:     finasteride (PROSCAR) 5 mg tablet, TAKE ONE TABLET BY MOUTH ONCE DAILY , Disp: 90 tablet, Rfl: 3    tamsulosin (FLOMAX) 0 4 mg, TAKE ONE CAPSULE BY MOUTH EVERY DAY WITH dinner, Disp: 30 capsule, Rfl: 5    polyethylene glycol (MIRALAX) 17 g packet, Take 17 g by mouth 2 (two) times a day for 14 days, Disp: 476 g, Rfl: 0  Allergies   Allergen Reactions    Other Anaphylaxis     Shellfish, shrimp    Dilaudid [Hydromorphone]        Objective     Current Vitals:   Blood Pressure: 110/68 (09/09/22 0852)  Pulse: 64 (09/09/22 0852)  Height: 5' 9" (175 3 cm) (09/09/22 0852)  Weight - Scale: 63 5 kg (140 lb) (09/09/22 3983)    Physical Exam  Vitals and nursing note reviewed  Constitutional:       General: He is not in acute distress  Cardiovascular:      Rate and Rhythm: Normal rate  Rhythm irregular  Pulmonary:      Effort: No respiratory distress  Breath sounds: Normal breath sounds  Abdominal:      Palpations: Abdomen is soft  There is no mass  Tenderness: There is no abdominal tenderness  Skin:     General: Skin is warm  Coloration: Skin is not jaundiced  Findings: No erythema or rash  Comments: 3 cm sebaceous cyst on the left upper back, mild erythema, small amount of drainage  Neurological:      Mental Status: He is alert and oriented to person, place, and time        Cranial Nerves: No cranial nerve deficit     Psychiatric:         Mood and Affect: Mood normal          Behavior: Behavior normal

## 2022-09-17 ENCOUNTER — APPOINTMENT (OUTPATIENT)
Dept: LAB | Facility: HOSPITAL | Age: 67
End: 2022-09-17
Payer: MEDICARE

## 2022-09-17 DIAGNOSIS — Z13.6 SCREENING FOR CARDIOVASCULAR CONDITION: ICD-10-CM

## 2022-09-17 DIAGNOSIS — Z12.5 SCREENING FOR PROSTATE CANCER: ICD-10-CM

## 2022-09-17 DIAGNOSIS — Z13.1 SCREENING FOR DIABETES MELLITUS: ICD-10-CM

## 2022-09-17 LAB
CHOLEST SERPL-MCNC: 181 MG/DL
GLUCOSE P FAST SERPL-MCNC: 103 MG/DL (ref 65–99)
HDLC SERPL-MCNC: 65 MG/DL
LDLC SERPL CALC-MCNC: 110 MG/DL (ref 0–100)
NONHDLC SERPL-MCNC: 116 MG/DL
TRIGL SERPL-MCNC: 31 MG/DL

## 2022-09-17 PROCEDURE — G0103 PSA SCREENING: HCPCS

## 2022-09-17 PROCEDURE — 36415 COLL VENOUS BLD VENIPUNCTURE: CPT

## 2022-09-17 PROCEDURE — 80061 LIPID PANEL: CPT

## 2022-09-17 PROCEDURE — 82947 ASSAY GLUCOSE BLOOD QUANT: CPT

## 2022-09-18 LAB — PSA SERPL-MCNC: 0.8 NG/ML (ref 0–4)

## 2022-09-19 ENCOUNTER — APPOINTMENT (OUTPATIENT)
Dept: PREADMISSION TESTING | Facility: HOSPITAL | Age: 67
End: 2022-09-19
Payer: MEDICARE

## 2022-09-20 NOTE — PRE-PROCEDURE INSTRUCTIONS
Pre-Surgery Instructions:   Medication Instructions   • finasteride (PROSCAR) 5 mg tablet Take day of surgery  • Multiple Vitamins-Minerals (ZINC PO) Stop taking 7 days prior to surgery  • tamsulosin (FLOMAX) 0 4 mg Take day of surgery  • VITAMIN D PO Stop taking 7 days prior to surgery  Preop bathing and medication instructions reviewed with pt via telephone call  Pt verbalizes understanding  No NSAIDS 5-7 days prior to surgery  Tylenol is OK to use  NPO after midnight the night prior to surgery  The hospital will call the pt with time and instructions one business day prior to surgery  Pt verbalizes understanding and all questions/concerns addressed

## 2022-10-28 ENCOUNTER — HOSPITAL ENCOUNTER (OUTPATIENT)
Facility: HOSPITAL | Age: 67
Setting detail: OUTPATIENT SURGERY
Discharge: HOME | End: 2022-10-28
Attending: SURGERY | Admitting: SURGERY
Payer: MEDICARE

## 2022-10-28 ENCOUNTER — ANESTHESIA EVENT (OUTPATIENT)
Dept: PERIOP | Facility: HOSPITAL | Age: 67
End: 2022-10-28
Payer: MEDICARE

## 2022-10-28 ENCOUNTER — ANESTHESIA (OUTPATIENT)
Dept: PERIOP | Facility: HOSPITAL | Age: 67
End: 2022-10-28
Payer: MEDICARE

## 2022-10-28 VITALS
RESPIRATION RATE: 13 BRPM | BODY MASS INDEX: 20.28 KG/M2 | WEIGHT: 136.91 LBS | HEART RATE: 58 BPM | HEIGHT: 69 IN | SYSTOLIC BLOOD PRESSURE: 120 MMHG | OXYGEN SATURATION: 100 % | DIASTOLIC BLOOD PRESSURE: 66 MMHG | TEMPERATURE: 97.2 F

## 2022-10-28 DIAGNOSIS — E86.0 DEHYDRATION: ICD-10-CM

## 2022-10-28 DIAGNOSIS — L72.3 INFECTED SEBACEOUS CYST: ICD-10-CM

## 2022-10-28 DIAGNOSIS — L08.9 INFECTED SEBACEOUS CYST: ICD-10-CM

## 2022-10-28 RX ORDER — MAGNESIUM HYDROXIDE 1200 MG/15ML
LIQUID ORAL AS NEEDED
Status: DISCONTINUED | OUTPATIENT
Start: 2022-10-28 | End: 2022-10-28 | Stop reason: HOSPADM

## 2022-10-28 RX ORDER — SODIUM CHLORIDE, SODIUM LACTATE, POTASSIUM CHLORIDE, CALCIUM CHLORIDE 600; 310; 30; 20 MG/100ML; MG/100ML; MG/100ML; MG/100ML
125 INJECTION, SOLUTION INTRAVENOUS
Status: COMPLETED | OUTPATIENT
Start: 2022-10-28 | End: 2022-10-28

## 2022-10-28 RX ORDER — CEFAZOLIN SODIUM 1 G/50ML
1000 SOLUTION INTRAVENOUS ONCE
Status: DISCONTINUED | OUTPATIENT
Start: 2022-10-28 | End: 2022-10-28 | Stop reason: HOSPADM

## 2022-10-28 RX ADMIN — SODIUM CHLORIDE, SODIUM LACTATE, POTASSIUM CHLORIDE, AND CALCIUM CHLORIDE 125 ML/HR: .6; .31; .03; .02 INJECTION, SOLUTION INTRAVENOUS at 07:14

## 2022-10-28 NOTE — ANESTHESIA PREPROCEDURE EVALUATION
Procedure:  EXCISION  BIOPSY LESION/MASS BACK (N/A Breast)    Relevant Problems   MUSCULOSKELETAL   (+) Acute back pain      NEURO/PSYCH   (+) Chronic abdominal pain   (+) History of solitary pulmonary nodule      Other   (+) Nicotine dependence   (+) Severe protein-calorie malnutrition (HCC)      H/o Afib    Physical Exam    Airway    Mallampati score: II  TM Distance: >3 FB  Neck ROM: full     Dental   upper dentures and lower dentures,     Cardiovascular      Pulmonary      Other Findings        Anesthesia Plan  ASA Score- 2     Anesthesia Type- IV sedation with anesthesia with ASA Monitors  Additional Monitors:   Airway Plan:           Plan Factors-Exercise tolerance (METS): >4 METS  Chart reviewed  Existing labs reviewed  Patient summary reviewed  Induction- intravenous  Postoperative Plan-     Informed Consent- Anesthetic plan and risks discussed with patient  I personally reviewed this patient with the CRNA  Discussed and agreed on the Anesthesia Plan with the CRNA  Erwin Rodríguez

## 2022-10-28 NOTE — QUICK NOTE
Patient is at the hospital for elective excision of infected sebaceous cyst, patient stated the lesion has not been bothering him at all  He does not feel anything around the area  On examination, there is no obvious lesions, cyst, black head or anything similar, no obvious evidence of infection  Plan:  Since the patient does not have any lesions to be removed, case was canceled

## 2023-01-05 ENCOUNTER — OFFICE VISIT (OUTPATIENT)
Dept: INTERNAL MEDICINE CLINIC | Facility: CLINIC | Age: 68
End: 2023-01-05

## 2023-01-05 VITALS
DIASTOLIC BLOOD PRESSURE: 70 MMHG | RESPIRATION RATE: 12 BRPM | WEIGHT: 142 LBS | HEIGHT: 69 IN | SYSTOLIC BLOOD PRESSURE: 122 MMHG | HEART RATE: 68 BPM | OXYGEN SATURATION: 97 % | BODY MASS INDEX: 21.03 KG/M2

## 2023-01-05 DIAGNOSIS — M75.82 ROTATOR CUFF TENDINITIS, LEFT: Primary | ICD-10-CM

## 2023-01-05 NOTE — PATIENT INSTRUCTIONS
Rotator Cuff Injury Exercises   WHAT YOU NEED TO KNOW:   Exercises help improve shoulder movement and strength, and decrease pain  Your physical therapist or healthcare provider will tell you when to start doing the exercises  He or she will tell you how often to do them  You will need to start slowly to prevent more injury  You will move through several levels over time as you get stronger and more flexible  DISCHARGE INSTRUCTIONS:   Safety guidelines:   Always warm up before you do the exercises  Walk or ride a stationary bike for 5 to 10 minutes to help you warm up  Do not put your arm over your head until directed  You will need to wait until your injury has healed  The movement of some exercises could continue until your arm is over your head  You will need to stop the movement where directed  Stop if you feel pain  You may feel some tight or stiff areas when you start  This should get better as you continue the exercises  You should not feel pain  Pain means you are not ready to do the exercise yet  Stop and call your physical therapist or healthcare provider right away  Always work both rotator cuffs  Even if your injury is only on 1 side, it is important to do each exercise on both sides  This helps prevent injury and maintain balance in your shoulders and back  Posture is important  Your physical therapist or healthcare provider will show you the proper posture for each exercise  You will be shown how to pull your shoulders back and down to engage the correct muscles  Remember not to let your shoulders shrug during an exercise unless it is part of the movement  How to do stretching exercises: You will not feel every exercise in your shoulder area  You may feel some of the stretches in your back, side, or upper arm muscles  You need to work muscles in and around your rotator cuffs and down your arms  This helps stabilize your shoulders   Your physical therapist or healthcare provider will tell you how long to hold each stretch  He or she will also tell you how many times to repeat each stretch during an exercise session  You may be told to do only certain exercises, or to do them in a specific order  The following are general directions to help you remember the exercises you are taught:  Pendulum swings:  Lean forward and rest your arm on a table  Do not round your back or lock your knees during the exercise  Let your other arm hang freely by your side  Gently swing your free arm forward and backward, side to side, and in circles  Crossover arm stretch:  Relax your shoulders  Hold your upper arm with the opposite hand  Pull your arm across your chest until you feel a stretch  Hold the stretch  Return to the starting position  Sleeper stretch:  Lie on your side on a firm, flat surface  Bend the elbow of your top arm 90°  Use your other hand to slowly push your arm down  Stop when you feel a stretch at the back of your shoulder  Hold the stretch  Slowly return to the starting position  Shoulder movement, up and down: This exercise may also be called shoulder extension  Sit in a chair that has a back but no armrests  Raise your arm like you are reaching for the wall in front of you  Continue to raise the arm until it is over your head, or as high as directed  Bring your arm back down to your side  Bring it back as far as possible behind your body  Return your arm to the starting position  Shoulder movement, side to side: These movements may be called flexion, internal rotation, and external rotation  Sit in a chair that has a back but no armrests  Raise your arm to the side and then up over your head as far as directed  Return your arm to your side  Bring your arm across the front of your body and reach for the opposite shoulder  Return your arm to the starting position  Shoulder rolls:  Stand and raise both shoulders toward your ears   Lower them to the starting position  Relax your shoulders  Pull your shoulders back  Then relax them again  Roll your shoulders in a smooth [place]  Then roll your shoulders in a smooth [place] in the other direction  Wall reach exercise: This may also be called a flexion stretch  Stand facing a wall  Slowly walk your fingers up the wall until you feel a stretch  Hold the stretch  Return to the starting position  Arm reach exercise:  Lie on your back with your legs straight  Reach your arms like you are trying to touch the ceiling  Reach as high as you can so you feel a stretch in the back of your arms  Hold the stretch  Then lower your arms to your sides  Elbow bends:  Stand with your arms down to your sides  Keep your palm facing forward  Bend your elbow and try to touch your shoulder with your fingertips  Return your arm to the starting position  Up the back stretch:  Stand and put both arms behind your back  Put one hand under the other  Move the bottom hand and slowly push the upper hand up toward your head  You should feel a stretch in the front of your arm and shoulder  Be careful not to push too hard  Hold the stretch  Then return to the starting position  Triceps stretch:  Stand and drop your forearm down your back so your hand is pointing to the ground behind you  Your elbow should be pointing at the ceiling  Take your other hand and place it on your elbow  Gently and slowly push on the elbow until you feel a stretch in the back of your arm  Hold the stretch  Let go of your elbow and relax your arm  You may be shown how to do this stretch with a towel  The towel can be pulled gently with a hand behind your back at waist level  How to do strengthening exercises:  Strengthening exercises may include handheld weights or resistance bands  Your physical therapist or healthcare provider will tell you how much weight or resistance to use   The general guide is to use light weights or low resistance and to do a high number of repetitions  You may be told to do only certain exercises, or to do them in a specific order  The following are general directions to help you remember the exercises you are taught:  Scapular squeeze:  Stand with your arms at your sides  Squeeze your shoulder blades together and hold this position  Then relax the muscles  Keep your shoulder back during the entire exercise  Wall pushups: This exercise is similar to a pushup done on the ground  The goal is to use your back and shoulder muscles to bring your upper body toward and away from the wall  Stand facing a wall  Put your hands on the wall  Bend your elbows to bring your upper body toward the wall  Straighten your arms to return to the starting position  Keep your feet close enough to the wall that you do not take a step when you bend your elbows  Standing row with exercise band:  Wrap the exercise band around a heavy, stable object at waist level  Make loop in the end of the band to create a handle, if needed  Hold the handle or loop and pull the band straight back toward your hip  Keep your shoulder down  Squeeze your shoulder blade  Hold this position  Then slowly return to the starting position  External rotation with arm abducted 90 degrees:  Wrap the exercise band around a heavy, stable object at waist level  Make loop in the end of the band to create a handle, if needed  Stand and hold the handle or loop  Bend your elbow and raise your arm to shoulder height  Keep your arm in this position  Raise your hand like you are pointing at the ceiling  Slowly return to the starting position  You may also be shown how to do this exercise lying down and with a weight  Shoulder abduction with weight:  Stand and hold a weight in your hand with your palm facing your body  Slowly raise your arm to the side with your thumb pointing up  Then raise your arm as far as you can without pain   Hold this position  Then return to the starting position  Shoulder abduction with exercise band:  Wrap the exercise band around a heavy, stable object near your foot  Grab the band  Keep your arm straight  Slowly raise your arm to the side with your thumb pointing up  Then, slowly pull the band as far as you can without pain  Slowly return to the starting position  Shoulder adduction with weight:  Lie on your back on a firm surface  Hold a weight in your hand at your shoulder  Slowly raise your arm toward the ceiling and straighten your elbow  Hold this position  Then slowly return to the starting position  Shoulder adduction with exercise band:  Wrap the exercise band around a heavy, stable object  Stand and face away from where the band is anchored  Hold each end of the band in both hands with your elbows bent  Your elbows should not be behind your body  Keep your arms parallel to the floor and slowly straighten your elbows  Hold this position  Slowly return to the starting position  Call your doctor or physical therapist if:   You have sharp or worsening pain during exercise or at rest     You have questions or concerns about your rotator cuff injury exercises  © Copyright [employer] 2022 Information is for End User's use only and may not be sold, redistributed or otherwise used for commercial purposes  All illustrations and images included in CareNotes® are the copyrighted property of A [employer] A M [employer] Inc  or [facility] [place] [employer]   The above information is an  only  It is not intended as medical advice for individual conditions or treatments  Talk to your doctor, nurse or pharmacist before following any medical regimen to see if it is safe and effective for you

## 2023-01-05 NOTE — PROGRESS NOTES
Assessment/Plan:     Most likely supraspinatus tendinitis  Reviewed exercises with him  He does not want to try physical therapy  Quality Measures:       Return if symptoms worsen or fail to improve  No problem-specific Assessment & Plan notes found for this encounter  Diagnoses and all orders for this visit:    Rotator cuff tendinitis, left        Subjective:      Patient ID: Eddie Sandoval is a 78 y o  male  Patient comes in today complaining of several weeks of left shoulder pain  He states just certain maneuvers it is difficult to lift the arm up more than about 45 degrees  Some days it is worse than the others  No specific trauma  It was hurting to lay on that side at night  That has improved  He states it does not bother him too much but his wife made him come to get it looked at        ALLERGIES:  Allergies   Allergen Reactions   • Other Anaphylaxis     Shellfish, shrimp   • Dilaudid [Hydromorphone]        CURRENT MEDICATIONS:    Current Outpatient Medications:   •  aspirin (ECOTRIN LOW STRENGTH) 81 mg EC tablet, Take 81 mg by mouth daily, Disp: , Rfl:   •  finasteride (PROSCAR) 5 mg tablet, TAKE ONE TABLET BY MOUTH ONCE DAILY, Disp: 90 tablet, Rfl: 3  •  Multiple Vitamins-Minerals (ZINC PO), Take by mouth, Disp: , Rfl:   •  tamsulosin (FLOMAX) 0 4 mg, TAKE ONE CAPSULE BY MOUTH ONCE DAILY with dinner, Disp: 90 capsule, Rfl: 1  •  VITAMIN D PO, Take by mouth, Disp: , Rfl:     ACTIVE PROBLEM LIST:  Patient Active Problem List   Diagnosis   • Ureterocele   • BPH associated with nocturia   • Epigastric pain   • Nicotine dependence   • Acute back pain   • Severe protein-calorie malnutrition (HCC)   • History of solitary pulmonary nodule   • Nutcracker phenomenon of renal vein   • Acute gastric ulcer without hemorrhage or perforation   • Lumbar disc disease   • Dyspepsia   • Chronic abdominal pain   • Infected sebaceous cyst       PAST MEDICAL HISTORY:  Past Medical History:   Diagnosis Date   • Anemia    • Atrial fibrillation (HCC)    • COPD (chronic obstructive pulmonary disease) (HCC)    • Hernia cerebri (HCC)    • History of atrial fibrillation    • Hyperlipidemia    • Lung nodule     RESOLVED 26 JUN 2016   • Renal calculi    • Sexual dysfunction        PAST SURGICAL HISTORY:  Past Surgical History:   Procedure Laterality Date   • FACIAL COSMETIC SURGERY      pt has metal hardware throughout face   • FACIAL COSMETIC SURGERY     • KIDNEY SURGERY     • ORBITAL FRACTURE SURGERY      CLOSED TREATMENT OF ORBITAL FRACTURE(NON-BLOWOUT)   • TONSILLECTOMY     • VASECTOMY         FAMILY HISTORY:  Family History   Problem Relation Age of Onset   • Stroke Mother    • Diabetes Mother    • Hypertension Mother    • Skin cancer Mother    • Heart disease Mother    • Scoliosis Sister        SOCIAL HISTORY:  Social History     Socioeconomic History   • Marital status: /Civil Union     Spouse name: Not on file   • Number of children: Not on file   • Years of education: Not on file   • Highest education level: Not on file   Occupational History   • Not on file   Tobacco Use   • Smoking status: Every Day     Packs/day: 0 25     Types: Cigarettes   • Smokeless tobacco: Former     Quit date: 9/17/2020   Vaping Use   • Vaping Use: Never used   Substance and Sexual Activity   • Alcohol use: Never   • Drug use: No   • Sexual activity: Not Currently   Other Topics Concern   • Not on file   Social History Narrative   • Not on file     Social Determinants of Health     Financial Resource Strain: Low Risk    • Difficulty of Paying Living Expenses: Not hard at all   Food Insecurity: Not on file   Transportation Needs: No Transportation Needs   • Lack of Transportation (Medical): No   • Lack of Transportation (Non-Medical):  No   Physical Activity: Not on file   Stress: Not on file   Social Connections: Not on file   Intimate Partner Violence: Not on file   Housing Stability: Not on file       Review of Systems Constitutional: Negative for fever  Cardiovascular: Negative for chest pain  Objective:  Vitals:    01/05/23 1512   BP: 122/70   BP Location: Left arm   Patient Position: Sitting   Pulse: 68   Resp: 12   SpO2: 97%   Weight: 64 4 kg (142 lb)   Height: 5' 9" (1 753 m)     Body mass index is 20 97 kg/m²  Physical Exam  Vitals and nursing note reviewed  Constitutional:       Appearance: Normal appearance  Musculoskeletal:      Comments: Pain was elicited with abduction of the arm against resistance  He was able to raise the arm 45 degrees without resistance before tenderness   Neurological:      Mental Status: He is alert  RESULTS:    No results found for this or any previous visit (from the past 1008 hour(s))  This note was created with voice recognition software  Phonic, grammatical and spelling errors may be present within the note as a result

## 2023-06-05 ENCOUNTER — TELEPHONE (OUTPATIENT)
Dept: INTERNAL MEDICINE CLINIC | Facility: CLINIC | Age: 68
End: 2023-06-05

## 2023-06-05 NOTE — TELEPHONE ENCOUNTER
Patient has an apt scheduled for 9/6/23 and would like to know if you want him to have labs done prior to this apt? Please let patient's wife know once they have been ordered

## 2023-06-05 NOTE — TELEPHONE ENCOUNTER
Bee Yap MD  [facility] Internal Med Clinical 3 minutes ago (3:15 PM)       Since his PSA will not be due until 9/17, I will just order labs at his appointment so he only has to go once

## 2023-09-01 ENCOUNTER — RA CDI HCC (OUTPATIENT)
Dept: OTHER | Facility: HOSPITAL | Age: 68
End: 2023-09-01

## 2023-09-01 NOTE — PROGRESS NOTES
[address] [facility] coding opportunities       Chart reviewed, no opportunity found: CHART REVIEWED, NO OPPORTUNITY FOUND        Patients Insurance     Medicare Insurance: Medicare

## 2023-09-06 ENCOUNTER — OFFICE VISIT (OUTPATIENT)
Dept: INTERNAL MEDICINE CLINIC | Facility: CLINIC | Age: 68
End: 2023-09-06
Payer: COMMERCIAL

## 2023-09-06 VITALS
WEIGHT: 141.4 LBS | BODY MASS INDEX: 20.94 KG/M2 | TEMPERATURE: 97.3 F | HEIGHT: 69 IN | OXYGEN SATURATION: 98 % | HEART RATE: 61 BPM | SYSTOLIC BLOOD PRESSURE: 98 MMHG | DIASTOLIC BLOOD PRESSURE: 64 MMHG | RESPIRATION RATE: 14 BRPM

## 2023-09-06 DIAGNOSIS — Z12.5 SCREENING FOR PROSTATE CANCER: ICD-10-CM

## 2023-09-06 DIAGNOSIS — R35.1 BPH ASSOCIATED WITH NOCTURIA: Primary | ICD-10-CM

## 2023-09-06 DIAGNOSIS — Z13.6 SCREENING FOR CARDIOVASCULAR CONDITION: ICD-10-CM

## 2023-09-06 DIAGNOSIS — R69 TAKING MEDICATION FOR CHRONIC DISEASE: ICD-10-CM

## 2023-09-06 DIAGNOSIS — N40.1 BPH ASSOCIATED WITH NOCTURIA: Primary | ICD-10-CM

## 2023-09-06 PROBLEM — K25.3 ACUTE GASTRIC ULCER WITHOUT HEMORRHAGE OR PERFORATION: Status: RESOLVED | Noted: 2020-10-02 | Resolved: 2023-09-06

## 2023-09-06 PROBLEM — L72.3 INFECTED SEBACEOUS CYST: Status: RESOLVED | Noted: 2022-09-09 | Resolved: 2023-09-06

## 2023-09-06 PROBLEM — E43 SEVERE PROTEIN-CALORIE MALNUTRITION (HCC): Status: RESOLVED | Noted: 2020-09-18 | Resolved: 2023-09-06

## 2023-09-06 PROBLEM — L08.9 INFECTED SEBACEOUS CYST: Status: RESOLVED | Noted: 2022-09-09 | Resolved: 2023-09-06

## 2023-09-06 PROCEDURE — 99214 OFFICE O/P EST MOD 30 MIN: CPT | Performed by: INTERNAL MEDICINE

## 2023-09-06 NOTE — PROGRESS NOTES
Assessment/Plan:     Has BPH symptoms remain stable with finasteride 5 mg daily and Flomax 0.4 mg daily. We will continue those. Check PSA. Check blood work on chronic medicines. Screen for cholesterol. Quality Measures:       Return in about 1 year (around 9/6/2024) for Regular visit and Medicare Wellness. No problem-specific Assessment & Plan notes found for this encounter. Diagnoses and all orders for this visit:    BPH associated with nocturia    Taking medication for chronic disease  -     CBC and differential; Future  -     Comprehensive metabolic panel; Future    Screening for prostate cancer  -     PSA, Total Screen; Future    Screening for cardiovascular condition  -     Lipid panel; Future          Subjective:      Patient ID: Nisha Russell is a 80 y.o. male. Patient comes in today for yearly follow-up. He states that things are about the same. His BPH symptoms are stable with the medicine. He takes the medicine routinely. He has had no further issues with the [place] since he was treated. Shoulder still bothers him from time to time but he does not want to see orthopedics. Denies any new complaints today. No further additions to his history.       ALLERGIES:  Allergies   Allergen Reactions   • Other Anaphylaxis     Shellfish, shrimp   • Dilaudid [Hydromorphone]        CURRENT MEDICATIONS:    Current Outpatient Medications:   •  aspirin (ECOTRIN LOW STRENGTH) 81 mg EC tablet, Take 81 mg by mouth daily, Disp: , Rfl:   •  finasteride (PROSCAR) 5 mg tablet, TAKE ONE TABLET BY MOUTH ONCE DAILY, Disp: 90 tablet, Rfl: 3  •  Multiple Vitamins-Minerals (ZINC PO), Take by mouth, Disp: , Rfl:   •  tamsulosin (FLOMAX) 0.4 mg, TAKE ONE CAPSULE BY MOUTH ONCE DAILY WITH DINNER, Disp: 90 capsule, Rfl: 1  •  VITAMIN D PO, Take by mouth, Disp: , Rfl:     ACTIVE PROBLEM LIST:  Patient Active Problem List   Diagnosis   • Ureterocele   • BPH associated with nocturia   • Epigastric pain   • Nicotine dependence   • Acute back pain   • History of solitary pulmonary nodule   • Nutcracker phenomenon of renal vein   • Lumbar disc disease   • Dyspepsia   • Chronic abdominal pain       PAST MEDICAL HISTORY:  Past Medical History:   Diagnosis Date   • Anemia    • Atrial fibrillation (HCC)    • COPD (chronic obstructive pulmonary disease) (HCC)    • Hernia cerebri (HCC)    • History of atrial fibrillation    • Hyperlipidemia    • Lung nodule     RESOLVED 26 JUN 2016   • Renal calculi    • Sexual dysfunction        PAST SURGICAL HISTORY:  Past Surgical History:   Procedure Laterality Date   • FACIAL COSMETIC SURGERY      pt has metal hardware throughout face   • FACIAL COSMETIC SURGERY     • KIDNEY SURGERY     • ORBITAL FRACTURE SURGERY      CLOSED TREATMENT OF ORBITAL FRACTURE(NON-BLOWOUT)   • TONSILLECTOMY     • VASECTOMY         FAMILY HISTORY:  Family History   Problem Relation Age of Onset   • Stroke Mother    • Diabetes Mother    • Hypertension Mother    • Skin cancer Mother    • Heart disease Mother    • Scoliosis Sister        SOCIAL HISTORY:  Social History     Socioeconomic History   • Marital status: /Civil Union     Spouse name: Not on file   • Number of children: Not on file   • Years of education: Not on file   • Highest education level: Not on file   Occupational History   • Not on file   Tobacco Use   • Smoking status: Every Day     Packs/day: 0.25     Types: Cigarettes   • Smokeless tobacco: Former     Quit date: 9/17/2020   Vaping Use   • Vaping Use: Never used   Substance and Sexual Activity   • Alcohol use: Never   • Drug use: No   • Sexual activity: Not Currently   Other Topics Concern   • Not on file   Social History Narrative   • Not on file     Social Determinants of Health     Financial Resource Strain: Low Risk  (9/1/2022)    Overall Financial Resource Strain (CARDIA)    • Difficulty of Paying Living Expenses: Not hard at all   Food Insecurity: Not on file   Transportation Needs: No Transportation Needs (9/1/2022)    PRAPARE - Transportation    • Lack of Transportation (Medical): No    • Lack of Transportation (Non-Medical): No   Physical Activity: Not on file   Stress: Not on file   Social Connections: Not on file   Intimate Partner Violence: Not on file   Housing Stability: Not on file       Review of Systems   Respiratory: Negative for shortness of breath. Cardiovascular: Negative for chest pain. Gastrointestinal: Negative for abdominal pain. Objective:  Vitals:    09/06/23 0847   BP: 98/64   BP Location: Left arm   Patient Position: Sitting   Cuff Size: Adult   Pulse: 61   Resp: 14   Temp: (!) 97.3 °F (36.3 °C)   TempSrc: Tympanic   SpO2: 98%   Weight: 64.1 kg (141 lb 6.4 oz)   Height: 5' 9" (1.753 m)     Body mass index is 20.88 kg/m². Physical Exam  Vitals and nursing note reviewed. Constitutional:       Appearance: He is well-developed. Cardiovascular:      Rate and Rhythm: Normal rate and regular rhythm. Heart sounds: Normal heart sounds. Pulmonary:      Effort: Pulmonary effort is normal.      Breath sounds: Normal breath sounds. Abdominal:      Palpations: Abdomen is soft. Tenderness: There is no abdominal tenderness. Neurological:      Mental Status: He is alert and oriented to person, place, and time.            RESULTS:  Cholesterol   Date/Time Value Ref Range Status   09/17/2022 09:29  See Comment mg/dL Final     Comment:     Cholesterol:         Pediatric <18 Years        Desirable          <170 mg/dL      Borderline High    170-199 mg/dL      High               >=200 mg/dL        Adult >=18 Years            Desirable        <200 mg/dL      Borderline High  200-239 mg/dL      High             >239 mg/dL       Triglycerides   Date/Time Value Ref Range Status   09/17/2022 09:29 AM 31 See Comment mg/dL Final     Comment:     Triglyceride:     0-9Y            <75mg/dL     10Y-17Y         <90 mg/dL       >=18Y     Normal          <150 mg/dL Borderline High 150-199 mg/dL     High            200-499 mg/dL        Very High       >499 mg/dL    Specimen collection should occur prior to N-Acetylcysteine or Metamizole administration due to the potential for falsely depressed results. 11/09/2015 08:46  mg/dL Final     Comment:     TRIGLYCERIDE:       Normal              <150 mg/dl       Borderline High    150-199 mg/dl       High               200-499 mg/dl       Very High          >499 mg/dl  _______________________________________       HDL   Date/Time Value Ref Range Status   11/09/2015 08:46 AM 51 mg/dL Final     Comment:     HDL:       High       >59 mg/dl       Low        <41 mg/dl  ______________________________       HDL, Direct   Date/Time Value Ref Range Status   09/17/2022 09:29 AM 65 >=40 mg/dL Final     Comment:     Specimen collection should occur prior to Metamizole administration due to the potential for falsley depressed results. LDL Calculated   Date/Time Value Ref Range Status   09/17/2022 09:29  (H) 0 - 100 mg/dL Final     Comment:     LDL Cholesterol:     Optimal           <100 mg/dl     Near Optimal      100-129 mg/dl     Above Optimal       Borderline High 130-159 mg/dl       High            160-189 mg/dl       Very High       >189 mg/dl         This screening LDL is a calculated result. It does not have the accuracy of the Direct Measured LDL in the monitoring of patients with hyperlipidemia and/or statin therapy. Direct Measure LDL (AKQ858) must be ordered separately in these patients. 11/09/2015 08:46  (H) 0 - 100 mg/dL Final     Comment:     LDL, CALCULATED:     This screening LDL is a calculated result. It does not have the accuracy of the Direct Measured LDL in the  monitoring of patients with hyperlipidemia and/or statin therapy.   Direct Measured LDL (Test Code 3126) must be ordered separately in these  patients.  ______________________________  The above 4 analytes were performed by [facility] [place] [id number]       Hemoglobin   Date/Time Value Ref Range Status   10/06/2020 05:31 AM 12.1 12.0 - 17.0 g/dL Final   09/22/2015 06:14 AM 13.2 12.0 - 17.0 g/dL Final     Hematocrit   Date/Time Value Ref Range Status   10/06/2020 05:31 AM 36.4 (L) 36.5 - 49.3 % Final   09/22/2015 06:14 AM 39.3 36.5 - 49.3 % Final     Platelets   Date/Time Value Ref Range Status   10/06/2020 05:31  149 - 390 Thousands/uL Final   09/22/2015 06:14  149 - 390 Thousand/uL Final     Comment:     The above 10 analytes were performed by [facility] [id number]       PSA   Date/Time Value Ref Range Status   09/17/2022 09:29 AM 0.8 0.0 - 4.0 ng/mL Final     Comment:     American Urological Association Guidelines define biochemical recurrence of prostate cancer as a detectable or rising PSA value post-radical prostatectomy that is greater than or equal to 0.2 ng/mL with a second confirmatory level of greater than or equal to 0.2 ng/mL. TSH 3RD GENERATON   Date/Time Value Ref Range Status   10/29/2015 11:34 AM 0.543 0.358 - 3.740 uIU/ML Final     Comment:     *Patients undergoing fluorescein dye angiography may retain small  amounts of fluorescein in the body for 48-72 hours post procedure. Samples containing fluorescein can produce falsely depressed TSH   values. If the patient had this procedure, a specimen should be  resubmitted post fluorescein clearance.   The above 1 analytes were performed by [facility] [id number]       Sodium   Date/Time Value Ref Range Status   10/06/2020 05:31  136 - 145 mmol/L Final     BUN   Date/Time Value Ref Range Status   10/06/2020 05:31 AM 12 5 - 25 mg/dL Final   10/29/2015 11:34 AM 21 5 - 25 mg/dL Final     Creatinine   Date/Time Value Ref Range Status   10/06/2020 05:31 AM 0.82 0.60 - 1.30 mg/dL Final     Comment:     Standardized to IDMS reference method   10/29/2015 11:34 AM 1.14 0.60 - 1.30 mg/dL Final     Comment: Standardized to IDMS reference method      In chart    This note was created with voice recognition software. Phonic, grammatical and spelling errors may be present within the note as a result.

## 2023-09-25 ENCOUNTER — APPOINTMENT (OUTPATIENT)
Dept: LAB | Facility: HOSPITAL | Age: 68
End: 2023-09-25
Payer: COMMERCIAL

## 2023-09-25 DIAGNOSIS — Z12.5 SCREENING FOR PROSTATE CANCER: ICD-10-CM

## 2023-09-25 DIAGNOSIS — Z13.6 SCREENING FOR CARDIOVASCULAR CONDITION: ICD-10-CM

## 2023-09-25 DIAGNOSIS — R69 TAKING MEDICATION FOR CHRONIC DISEASE: ICD-10-CM

## 2023-09-25 LAB
ALBUMIN SERPL BCP-MCNC: 4.4 G/DL (ref 3.5–5)
ALP SERPL-CCNC: 49 U/L (ref 34–104)
ALT SERPL W P-5'-P-CCNC: 12 U/L (ref 7–52)
ANION GAP SERPL CALCULATED.3IONS-SCNC: 4 MMOL/L
AST SERPL W P-5'-P-CCNC: 13 U/L (ref 13–39)
BASOPHILS # BLD AUTO: 0.06 THOUSANDS/ÂΜL (ref 0–0.1)
BASOPHILS NFR BLD AUTO: 1 % (ref 0–1)
BILIRUB SERPL-MCNC: 0.39 MG/DL (ref 0.2–1)
BUN SERPL-MCNC: 24 MG/DL (ref 5–25)
CALCIUM SERPL-MCNC: 9.4 MG/DL (ref 8.4–10.2)
CHLORIDE SERPL-SCNC: 104 MMOL/L (ref 96–108)
CHOLEST SERPL-MCNC: 186 MG/DL
CO2 SERPL-SCNC: 31 MMOL/L (ref 21–32)
CREAT SERPL-MCNC: 0.83 MG/DL (ref 0.6–1.3)
EOSINOPHIL # BLD AUTO: 0.1 THOUSAND/ÂΜL (ref 0–0.61)
EOSINOPHIL NFR BLD AUTO: 2 % (ref 0–6)
ERYTHROCYTE [DISTWIDTH] IN BLOOD BY AUTOMATED COUNT: 13.2 % (ref 11.6–15.1)
GFR SERPL CREATININE-BSD FRML MDRD: 91 ML/MIN/1.73SQ M
GLUCOSE P FAST SERPL-MCNC: 105 MG/DL (ref 65–99)
HCT VFR BLD AUTO: 42.9 % (ref 36.5–49.3)
HDLC SERPL-MCNC: 55 MG/DL
HGB BLD-MCNC: 13.8 G/DL (ref 12–17)
IMM GRANULOCYTES # BLD AUTO: 0.02 THOUSAND/UL (ref 0–0.2)
IMM GRANULOCYTES NFR BLD AUTO: 0 % (ref 0–2)
LDLC SERPL CALC-MCNC: 115 MG/DL (ref 0–100)
LYMPHOCYTES # BLD AUTO: 2.52 THOUSANDS/ÂΜL (ref 0.6–4.47)
LYMPHOCYTES NFR BLD AUTO: 44 % (ref 14–44)
MCH RBC QN AUTO: 29.1 PG (ref 26.8–34.3)
MCHC RBC AUTO-ENTMCNC: 32.2 G/DL (ref 31.4–37.4)
MCV RBC AUTO: 90 FL (ref 82–98)
MONOCYTES # BLD AUTO: 0.41 THOUSAND/ÂΜL (ref 0.17–1.22)
MONOCYTES NFR BLD AUTO: 7 % (ref 4–12)
NEUTROPHILS # BLD AUTO: 2.61 THOUSANDS/ÂΜL (ref 1.85–7.62)
NEUTS SEG NFR BLD AUTO: 46 % (ref 43–75)
NONHDLC SERPL-MCNC: 131 MG/DL
NRBC BLD AUTO-RTO: 0 /100 WBCS
PLATELET # BLD AUTO: 189 THOUSANDS/UL (ref 149–390)
PMV BLD AUTO: 10.8 FL (ref 8.9–12.7)
POTASSIUM SERPL-SCNC: 4 MMOL/L (ref 3.5–5.3)
PROT SERPL-MCNC: 6.7 G/DL (ref 6.4–8.4)
PSA SERPL-MCNC: 0.94 NG/ML (ref 0–4)
RBC # BLD AUTO: 4.75 MILLION/UL (ref 3.88–5.62)
SODIUM SERPL-SCNC: 139 MMOL/L (ref 135–147)
TRIGL SERPL-MCNC: 80 MG/DL
WBC # BLD AUTO: 5.72 THOUSAND/UL (ref 4.31–10.16)

## 2023-09-25 PROCEDURE — G0103 PSA SCREENING: HCPCS

## 2023-09-25 PROCEDURE — 36415 COLL VENOUS BLD VENIPUNCTURE: CPT

## 2023-09-25 PROCEDURE — 80053 COMPREHEN METABOLIC PANEL: CPT

## 2023-09-25 PROCEDURE — 80061 LIPID PANEL: CPT

## 2023-09-25 PROCEDURE — 85025 COMPLETE CBC W/AUTO DIFF WBC: CPT

## 2023-11-13 DIAGNOSIS — N40.1 BPH ASSOCIATED WITH NOCTURIA: ICD-10-CM

## 2023-11-13 DIAGNOSIS — R35.1 BPH ASSOCIATED WITH NOCTURIA: ICD-10-CM

## 2023-11-13 RX ORDER — TAMSULOSIN HYDROCHLORIDE 0.4 MG/1
0.4 CAPSULE ORAL
Qty: 90 CAPSULE | Refills: 0 | Status: SHIPPED | OUTPATIENT
Start: 2023-11-13

## 2023-12-08 ENCOUNTER — OFFICE VISIT (OUTPATIENT)
Dept: INTERNAL MEDICINE CLINIC | Facility: CLINIC | Age: 68
End: 2023-12-08
Payer: COMMERCIAL

## 2023-12-08 VITALS
HEIGHT: 69 IN | OXYGEN SATURATION: 97 % | TEMPERATURE: 97.5 F | BODY MASS INDEX: 21.06 KG/M2 | DIASTOLIC BLOOD PRESSURE: 64 MMHG | SYSTOLIC BLOOD PRESSURE: 102 MMHG | RESPIRATION RATE: 16 BRPM | WEIGHT: 142.2 LBS | HEART RATE: 63 BPM

## 2023-12-08 DIAGNOSIS — H25.13 AGE-RELATED NUCLEAR CATARACT OF BOTH EYES: ICD-10-CM

## 2023-12-08 DIAGNOSIS — Z01.818 PRE-OP EXAMINATION: Primary | ICD-10-CM

## 2023-12-08 PROCEDURE — 99214 OFFICE O/P EST MOD 30 MIN: CPT | Performed by: INTERNAL MEDICINE

## 2023-12-08 NOTE — PROGRESS NOTES
Presurgical Evaluation    Subjective:      Patient ID: Diaz Peralta is a 80 y.o. male. Chief Complaint   Patient presents with   • Pre-op Exam       Patient comes in today for preop evaluation for bilateral cataract surgery secondary to worsening vision. He has no complaints today. The following portions of the patient's history were reviewed and updated as appropriate: allergies, current medications, past family history, past medical history, past social history, past surgical history, and problem list.    Procedure date: 12/26    Surgeon:  Dr Glover   Planned procedure:  Cataract extraction  Diagnosis for procedure:  Cataract    Prior anesthesia: Yes   General; Complications:  None / Tolerated well    CAD History: None     Pulmonary History: None    Renal history: None    Diabetes History:  None     Neurological History: None     On Immunosuppressant meds/biologics: No      Review of Systems   Respiratory:  Negative for shortness of breath. Cardiovascular:  Negative for chest pain. Gastrointestinal:  Negative for abdominal pain. Current Outpatient Medications   Medication Sig Dispense Refill   • aspirin (ECOTRIN LOW STRENGTH) 81 mg EC tablet Take 81 mg by mouth daily     • finasteride (PROSCAR) 5 mg tablet TAKE ONE TABLET BY MOUTH ONCE DAILY 90 tablet 3   • Multiple Vitamins-Minerals (ZINC PO) Take by mouth     • tamsulosin (FLOMAX) 0.4 mg TAKE ONE CAPSULE BY MOUTH EVERY DAY WITH dinner 90 capsule 0   • VITAMIN D PO Take by mouth       No current facility-administered medications for this visit. Allergies on file:    Other and Dilaudid [hydromorphone]    Patient Active Problem List   Diagnosis   • Ureterocele   • BPH associated with nocturia   • Epigastric pain   • Nicotine dependence   • Acute back pain   • History of solitary pulmonary nodule   • Nutcracker phenomenon of renal vein   • Lumbar disc disease   • Dyspepsia   • Chronic abdominal pain        Past Medical History: Diagnosis Date   • Anemia    • Atrial fibrillation (HCC)    • COPD (chronic obstructive pulmonary disease) (HCC)    • Hernia cerebri (HCC)    • History of atrial fibrillation    • Hyperlipidemia    • Lung nodule     RESOLVED 26 JUN 2016   • Renal calculi    • Sexual dysfunction        Past Surgical History:   Procedure Laterality Date   • FACIAL COSMETIC SURGERY      pt has metal hardware throughout face   • FACIAL COSMETIC SURGERY     • KIDNEY SURGERY     • ORBITAL FRACTURE SURGERY      CLOSED TREATMENT OF ORBITAL FRACTURE(NON-BLOWOUT)   • TONSILLECTOMY     • VASECTOMY         Family History   Problem Relation Age of Onset   • Stroke Mother    • Diabetes Mother    • Hypertension Mother    • Skin cancer Mother    • Heart disease Mother    • Scoliosis Sister        Social History     Tobacco Use   • Smoking status: Every Day     Packs/day: 0.25     Types: Cigarettes   • Smokeless tobacco: Former     Quit date: 9/17/2020   Vaping Use   • Vaping Use: Never used   Substance Use Topics   • Alcohol use: Never   • Drug use: No       Objective:    Vitals:    12/08/23 0825   BP: 102/64   BP Location: Left arm   Patient Position: Sitting   Cuff Size: Adult   Pulse: 63   Resp: 16   Temp: 97.5 °F (36.4 °C)   TempSrc: Tympanic   SpO2: 97%   Weight: 64.5 kg (142 lb 3.2 oz)   Height: 5' 9" (1.753 m)        Physical Exam  Vitals and nursing note reviewed. Constitutional:       Appearance: Normal appearance. He is well-developed. Cardiovascular:      Rate and Rhythm: Normal rate and regular rhythm. Heart sounds: Normal heart sounds. Pulmonary:      Effort: Pulmonary effort is normal.      Breath sounds: Normal breath sounds. Abdominal:      Palpations: Abdomen is soft. Tenderness: There is no abdominal tenderness. Neurological:      Mental Status: He is alert and oriented to person, place, and time.            Preop labs/testing available and reviewed: no               EKG no    Echo no    Stress test/cath no    PFT/[place] no    Functional capacity: Shovel snow                          6 Mets   Pick the highest level patient can comfortably perform   4 mets or greater for surgery    RCRI  High Risk surgery? 1 Point  CAD History:         1 Point   MI; Positive Stress Test; CP due to Mi;  Nitrate Usage to control Angina; Pathologic Q wave on EKG  CHF Active:         1 Point   Pulm Edema; Paroxysmal Nocturnal Dyspnea;  Bibasilar Rales (crackles);S3; CHF on CXR  Cerebrovascular Disease (TIA or CVA):     1 Point  DM on Insulin:        1 Point  Serum Creat >2.0 mg/dl:       1 Point          Total Points: 0     Scorin: Class I, Very Low Risk (0.4%)     1: Class II, Low risk (0.9%)     2: Class III Moderate (6.6%)     3: Class IV High (>11%)      KAITLIN Risk:  GFR:        For PCP:  If GFR>60, Hold ACE/ARB/Diuretic on the day of surgery, and NSAIDS 10 days before. If GFR<45, Consider PRE and POST op Nephrology Consult. If 46 <GFR> 59 : Has Patient had KAITLIN in last 6 Months? no   If YES: Preop Nephrology consult   If No:  [id number] [address] [facility] Nephrology consult. Assessment/Plan:    Patient is medically optimized (cleared) for the planned procedure. Further testing/evaluation is not required.     Postop concerns: no    Problem List Items Addressed This Visit    None  Visit Diagnoses     Pre-op examination    -  Primary    Age-related nuclear cataract of both eyes                 Diagnoses and all orders for this visit:    Pre-op examination    Age-related nuclear cataract of both eyes          Pre-Surgery Instructions:   Medication Instructions   • aspirin (ECOTRIN LOW STRENGTH) 81 mg EC tablet Stop taking 1 week prior to surgery   • finasteride (PROSCAR) 5 mg tablet per anesthesia guidelines    • Multiple Vitamins-Minerals (ZINC PO) per anesthesia guidelines    • tamsulosin (FLOMAX) 0.4 mg per anesthesia guidelines    • VITAMIN D PO per anesthesia guidelines         NOTE: Please use the above to review important meds for your specialty, the remainder "per anesthesia Guidelines."    NOTE: Please place an Inbasket message for "[facility]" pool for complicated patients.

## 2023-12-14 DIAGNOSIS — N40.0 BENIGN PROSTATIC HYPERPLASIA, UNSPECIFIED WHETHER LOWER URINARY TRACT SYMPTOMS PRESENT: ICD-10-CM

## 2023-12-14 NOTE — TELEPHONE ENCOUNTER
Pt last seen: 2/13/2020 [facility] [place]  Courtesy refill NOT provided.  Patient needs an appointment. Please contact the patient to schedule an appointment.      PLEASE REVIEW

## 2023-12-18 NOTE — TELEPHONE ENCOUNTER
L/m for patient - asked if Dr. Busby can refill this for him since he has not been in our office for 3 years . If he would like us to he will need an appointment.

## 2023-12-20 RX ORDER — FINASTERIDE 5 MG/1
TABLET, FILM COATED ORAL
Qty: 90 TABLET | Refills: 1 | Status: SHIPPED | OUTPATIENT
Start: 2023-12-20

## 2024-02-13 DIAGNOSIS — R35.1 BPH ASSOCIATED WITH NOCTURIA: ICD-10-CM

## 2024-02-13 DIAGNOSIS — N40.1 BPH ASSOCIATED WITH NOCTURIA: ICD-10-CM

## 2024-02-13 RX ORDER — TAMSULOSIN HYDROCHLORIDE 0.4 MG/1
0.4 CAPSULE ORAL
Qty: 90 CAPSULE | Refills: 3 | Status: SHIPPED | OUTPATIENT
Start: 2024-02-13

## 2024-06-14 DIAGNOSIS — N40.0 BENIGN PROSTATIC HYPERPLASIA, UNSPECIFIED WHETHER LOWER URINARY TRACT SYMPTOMS PRESENT: ICD-10-CM

## 2024-06-14 RX ORDER — FINASTERIDE 5 MG/1
TABLET, FILM COATED ORAL
Qty: 90 TABLET | Refills: 0 | Status: SHIPPED | OUTPATIENT
Start: 2024-06-14

## 2024-09-23 DIAGNOSIS — N40.0 BENIGN PROSTATIC HYPERPLASIA, UNSPECIFIED WHETHER LOWER URINARY TRACT SYMPTOMS PRESENT: ICD-10-CM

## 2024-09-23 RX ORDER — FINASTERIDE 5 MG/1
TABLET, FILM COATED ORAL
Qty: 90 TABLET | Refills: 0 | OUTPATIENT
Start: 2024-09-23

## 2024-09-23 NOTE — TELEPHONE ENCOUNTER
Called and left a VM for pt to call back and schedule a NP appt to re-establish care with urology to get his medication refilled- noted that we did see he gets his meds filled by his PCP and if he would like to he can continue to have him refill his script.     Provided a callback number for pt f he wishes to make an appt with us.

## 2024-09-25 NOTE — TELEPHONE ENCOUNTER
2nd attempt: [employer] message sent as follows :   Jasvir Spangler ,  The urology provider states that you are due for a follow up . She said to please schedule appointment if you wish for us to fill medication- If not, Primary care physician can refill. Thanks

## 2024-10-03 DIAGNOSIS — N40.0 BENIGN PROSTATIC HYPERPLASIA, UNSPECIFIED WHETHER LOWER URINARY TRACT SYMPTOMS PRESENT: ICD-10-CM

## 2024-10-03 RX ORDER — FINASTERIDE 5 MG/1
TABLET, FILM COATED ORAL
Qty: 90 TABLET | Refills: 1 | Status: SHIPPED | OUTPATIENT
Start: 2024-10-03

## 2024-10-03 NOTE — TELEPHONE ENCOUNTER
Reason for call:   [] Refill   [] Prior Auth  [x] Other:     Zoila from [facility] called stated patient called for refill but they need a new script.    I advised we receive the request from the pharmacy and its pending approval. Zoila state patient have less than 3 days of medication.    Advised I will note the chart and resend for approval at     Zoila verbalized understanding.

## 2024-12-20 ENCOUNTER — VBI (OUTPATIENT)
Dept: ADMINISTRATIVE | Facility: OTHER | Age: 69
End: 2024-12-20

## 2024-12-20 NOTE — TELEPHONE ENCOUNTER
12/20/24 11:45 AM     Chart reviewed for nothing was submitted to patients insurance.    Mishel Lagos   [facility]

## 2025-01-15 ENCOUNTER — OFFICE VISIT (OUTPATIENT)
Dept: INTERNAL MEDICINE CLINIC | Facility: CLINIC | Age: 70
End: 2025-01-15
Payer: COMMERCIAL

## 2025-01-15 VITALS
OXYGEN SATURATION: 100 % | DIASTOLIC BLOOD PRESSURE: 68 MMHG | BODY MASS INDEX: 20.44 KG/M2 | WEIGHT: 138 LBS | HEART RATE: 67 BPM | SYSTOLIC BLOOD PRESSURE: 118 MMHG | HEIGHT: 69 IN

## 2025-01-15 DIAGNOSIS — Z13.6 SCREENING FOR CARDIOVASCULAR CONDITION: ICD-10-CM

## 2025-01-15 DIAGNOSIS — F17.210 CIGARETTE NICOTINE DEPENDENCE WITHOUT COMPLICATION: ICD-10-CM

## 2025-01-15 DIAGNOSIS — J44.9 CHRONIC OBSTRUCTIVE PULMONARY DISEASE, UNSPECIFIED COPD TYPE (HCC): ICD-10-CM

## 2025-01-15 DIAGNOSIS — Z12.5 SCREENING FOR PROSTATE CANCER: ICD-10-CM

## 2025-01-15 DIAGNOSIS — R35.1 BPH ASSOCIATED WITH NOCTURIA: ICD-10-CM

## 2025-01-15 DIAGNOSIS — Z00.00 MEDICARE ANNUAL WELLNESS VISIT, SUBSEQUENT: Primary | ICD-10-CM

## 2025-01-15 DIAGNOSIS — N40.1 BPH ASSOCIATED WITH NOCTURIA: ICD-10-CM

## 2025-01-15 PROCEDURE — G0439 PPPS, SUBSEQ VISIT: HCPCS | Performed by: INTERNAL MEDICINE

## 2025-01-15 PROCEDURE — 99214 OFFICE O/P EST MOD 30 MIN: CPT | Performed by: INTERNAL MEDICINE

## 2025-01-15 RX ORDER — MULTIVIT WITH MINERALS/LUTEIN
1000 TABLET ORAL DAILY
COMMUNITY

## 2025-01-15 NOTE — ASSESSMENT & PLAN NOTE
Stable.  Continue current medications  Orders:  •  CBC and differential; Future  •  Comprehensive metabolic panel; Future

## 2025-01-15 NOTE — PROGRESS NOTES
Name: Aníbal Bowser      : 1955      MRN: [id number]  Encounter Provider: Chidi Busby MD  Encounter Date: 1/15/2025   Encounter department: [facility] INTERNAL MEDICINE [place]    Assessment & Plan  Medicare annual wellness visit, subsequent  No new problems noted       Screening for cardiovascular condition    Orders:  •  Lipid panel; Future    Screening for prostate cancer    Orders:  •  PSA, Total Screen; Future    BPH associated with nocturia  Stable.  Continue current medications  Orders:  •  CBC and differential; Future  •  Comprehensive metabolic panel; Future    Chronic obstructive pulmonary disease, unspecified COPD type (HCC)  Stable.  On no meds,       Cigarette nicotine dependence without complication  Encouraged him to pursue the nicotine patches.  Explained to him that they are over-the-counter.  But his insurance might have a program where they would cover it to try and get him off cigarettes.  He will let us know if he would need a prescription.          Preventive health issues were discussed with patient, and age appropriate screening tests were ordered as noted in patient's After Visit Summary. Personalized health advice and appropriate referrals for health education or preventive services given if needed, as noted in patient's After Visit Summary.    History of Present Illness     Patient comes in today for routine follow-up and Medicare wellness.  He states he is doing well with no new complaints.  He was interested in getting nicotine patches for smoking cessation.  He does have COPD but really not bothered by it.  He continues to take his medicine for BPH which works to a point.  No complaints.  Due for labs.  No other complaints today.  No further additions to his history.       Patient Care Team:  Chidi Busby MD as PCP - General (Internal Medicine)  MD Chidi Perez MD Ramani Gosala, MD Jessica K Nalepka, PA-C as Physician Assistant  (Gastroenterology)    Review of Systems   Respiratory:  Negative for shortness of breath.    Cardiovascular:  Negative for chest pain.   Gastrointestinal:  Negative for abdominal pain.     Medical History Reviewed by provider this encounter:       Annual Wellness Visit Questionnaire   Aníbal is here for his Subsequent Wellness visit.     Health Risk Assessment:   Patient rates overall health as excellent. Patient feels that their physical health rating is same. Patient is very satisfied with their life. Eyesight was rated as same. Hearing was rated as same. Patient feels that their emotional and mental health rating is same. Patients states they are never, rarely angry. Patient states they are never, rarely unusually tired/fatigued. Pain experienced in the last 7 days has been some. Patient's pain rating has been 5/10. Patient states that he has experienced no weight loss or gain in last 6 months.     Depression Screening:   PHQ-2 Score: 0      Fall Risk Screening:   In the past year, patient has experienced: no history of falling in past year      Home Safety:  Patient does not have trouble with stairs inside or outside of their home. Patient has working smoke alarms and has working carbon monoxide detector. Home safety hazards include: none.     Nutrition:   Current diet is Limited junk food.     Medications:   Patient is currently taking over-the-counter supplements. OTC medications include: see medication list. Patient is able to manage medications.     Activities of Daily Living (ADLs)/Instrumental Activities of Daily Living (IADLs):   Walk and transfer into and out of bed and chair?: Yes  Dress and groom yourself?: Yes    Bathe or shower yourself?: Yes    Feed yourself? Yes  Do your laundry/housekeeping?: Yes  Manage your money, pay your bills and track your expenses?: Yes  Make your own meals?: Yes    Do your own shopping?: Yes    Previous Hospitalizations:   Any hospitalizations or ED visits within the last  12 months?: No      Advance Care Planning:   Living will: Yes    Durable POA for healthcare: Yes    Advanced directive: Yes    Advanced directive counseling given: Yes      Cognitive Screening:   Provider or family/friend/caregiver concerned regarding cognition?: No    PREVENTIVE SCREENINGS      Cardiovascular Screening:    General: Screening Current      Diabetes Screening:     General: Risks and Benefits Discussed      Colorectal Cancer Screening:     General: Screening Current      Prostate Cancer Screening:      Due for: PSA      Osteoporosis Screening:    General: Screening Not Indicated      Abdominal Aortic Aneurysm (AAA) Screening:    Risk factors include: age between 65-76 yo and tobacco use        General: Screening Not Indicated      Lung Cancer Screening:     General: Screening Not Indicated      Hepatitis C Screening:    General: Screening Current    Screening, Brief Intervention, and Referral to Treatment (SBIRT)    Screening  Typical number of drinks in a day: 0  Typical number of drinks in a week: 0  Interpretation: Low risk drinking behavior.    AUDIT-C Screenin) How often did you have a drink containing alcohol in the past year? never  2) How many drinks did you have on a typical day when you were drinking in the past year? 0  3) How often did you have 6 or more drinks on one occasion in the past year? never    AUDIT-C Score: 0  Interpretation: Score 0-3 (male): Negative screen for alcohol misuse    Single Item Drug Screening:  How often have you used an illegal drug (including marijuana) or a prescription medication for non-medical reasons in the past year? never    Single Item Drug Screen Score: 0  Interpretation: Negative screen for possible drug use disorder    Brief Intervention  Alcohol & drug use screenings were reviewed. No concerns regarding substance use disorder identified.     Social Drivers of Health     Financial Resource Strain: Low Risk  (2022)    Overall Financial Resource  "Strain (CARDIA)    • Difficulty of Paying Living Expenses: Not hard at all   Food Insecurity: No Food Insecurity (1/15/2025)    Hunger Vital Sign    • Worried About Running Out of Food in the Last Year: Never true    • Ran Out of Food in the Last Year: Never true   Transportation Needs: No Transportation Needs (1/15/2025)    PRAPARE - Transportation    • Lack of Transportation (Medical): No    • Lack of Transportation (Non-Medical): No   Housing Stability: Low Risk  (1/15/2025)    Housing Stability Vital Sign    • Unable to Pay for Housing in the Last Year: No    • Number of Times Moved in the Last Year: 0    • Homeless in the Last Year: No   Utilities: Not At Risk (1/15/2025)    [facility] Utilities    • Threatened with loss of utilities: No     No results found.    Objective   /68 (BP Location: Left arm, Patient Position: Sitting, Cuff Size: Standard)   Pulse 67   Ht 5' 9\" (1.753 m)   Wt 62.6 kg (138 lb)   SpO2 100%   BMI 20.38 kg/m²     Physical Exam  Vitals and nursing note reviewed.   Cardiovascular:      Rate and Rhythm: Normal rate and regular rhythm.   Pulmonary:      Effort: Pulmonary effort is normal.      Breath sounds: Normal breath sounds.   Abdominal:      General: Abdomen is flat.      Tenderness: There is no abdominal tenderness.   Musculoskeletal:      Right lower leg: No edema.      Left lower leg: No edema.   Neurological:      Mental Status: He is alert and oriented to person, place, and time.   Psychiatric:         Behavior: Behavior normal.         Thought Content: Thought content normal.         Judgment: Judgment normal.         "

## 2025-01-15 NOTE — ASSESSMENT & PLAN NOTE
Encouraged him to pursue the nicotine patches.  Explained to him that they are over-the-counter.  But his insurance might have a program where they would cover it to try and get him off cigarettes.  He will let us know if he would need a prescription.

## 2025-01-15 NOTE — PATIENT INSTRUCTIONS
Medicare Preventive Visit Patient Instructions  Thank you for completing your Welcome to Medicare Visit or Medicare Annual Wellness Visit today. Your next wellness visit will be due in one year (1/16/2026).  The screening/preventive services that you may require over the next 5-10 years are detailed below. Some tests may not apply to you based off risk factors and/or age. Screening tests ordered at today's visit but not completed yet may show as past due. Also, please note that scanned in results may not display below.  Preventive Screenings:  Service Recommendations Previous Testing/Comments   Colorectal Cancer Screening  Colonoscopy    Fecal Occult Blood Test (FOBT)/Fecal Immunochemical Test (FIT)  Fecal DNA/Cologuard Test  Flexible Sigmoidoscopy Age: 45-75 years old   Colonoscopy: every 10 years (May be performed more frequently if at higher risk)  OR  FOBT/FIT: every 1 year  OR  Cologuard: every 3 years  OR  Sigmoidoscopy: every 5 years  Screening may be recommended earlier than age 45 if at higher risk for colorectal cancer. Also, an individualized decision between you and your healthcare provider will decide whether screening between the ages of 76-85 would be appropriate. Colonoscopy: 10/05/2020  FOBT/FIT: Not on file  Cologuard: Not on file  Sigmoidoscopy: Not on file    Screening Current     Prostate Cancer Screening Individualized decision between patient and health care provider in men between ages of 55-69   Medicare will cover every 12 months beginning on the day after your 50th birthday PSA: 0.94 ng/mL           Hepatitis C Screening Once for adults born between 1945 and 1965  More frequently in patients at high risk for Hepatitis C Hep C Antibody: 09/24/2020    Screening Current   Diabetes Screening 1-2 times per year if you're at risk for diabetes or have pre-diabetes Fasting glucose: 105 mg/dL (9/25/2023)  A1C: No results in last 5 years (No results in last 5 years)      Cholesterol Screening Once  every 5 years if you don't have a lipid disorder. May order more often based on risk factors. Lipid panel: 09/25/2023  Screening Current      Other Preventive Screenings Covered by Medicare:  Abdominal Aortic Aneurysm (AAA) Screening: covered once if your at risk. You're considered to be at risk if you have a family history of AAA or a male between the age of 65-75 who smoking at least 100 cigarettes in your lifetime.  Lung Cancer Screening: covers low dose CT scan once per year if you meet all of the following conditions: (1) Age 55-77; (2) No signs or symptoms of lung cancer; (3) Current smoker or have quit smoking within the last 15 years; (4) You have a tobacco smoking history of at least 20 pack years (packs per day x number of years you smoked); (5) You get a written order from a healthcare provider.  Glaucoma Screening: covered annually if you're considered high risk: (1) You have diabetes OR (2) Family history of glaucoma OR (3)  aged 50 and older OR (4)  American aged 65 and older  Osteoporosis Screening: covered every 2 years if you meet one of the following conditions: (1) Have a vertebral abnormality; (2) On glucocorticoid therapy for more than 3 months; (3) Have primary hyperparathyroidism; (4) On osteoporosis medications and need to assess response to drug therapy.  HIV Screening: covered annually if you're between the age of 15-65. Also covered annually if you are younger than 15 and older than 65 with risk factors for HIV infection. For pregnant patients, it is covered up to 3 times per pregnancy.    Immunizations:  Immunization Recommendations   Influenza Vaccine Annual influenza vaccination during flu season is recommended for all persons aged >= 6 months who do not have contraindications   Pneumococcal Vaccine   * Pneumococcal conjugate vaccine = PCV13 (Prevnar 13), PCV15 (Vaxneuvance), PCV20 (Prevnar 20)  * Pneumococcal polysaccharide vaccine = PPSV23 (Pneumovax) Adults  19-65 yo with certain risk factors or if 65+ yo  If never received any pneumonia vaccine: recommend Prevnar 20 (PCV20)  Give PCV20 if previously received 1 dose of PCV13 or PPSV23   Hepatitis B Vaccine 3 dose series if at intermediate or high risk (ex: diabetes, end stage renal disease, liver disease)   Respiratory syncytial virus (RSV) Vaccine - COVERED BY MEDICARE PART D  * RSVPreF3 (Arexvy) CDC recommends that adults 60 years of age and older may receive a single dose of RSV vaccine using shared clinical decision-making (SCDM)   Tetanus (Td) Vaccine - COST NOT COVERED BY MEDICARE PART B Following completion of primary series, a booster dose should be given every 10 years to maintain immunity against tetanus. Td may also be given as tetanus wound prophylaxis.   Tdap Vaccine - COST NOT COVERED BY MEDICARE PART B Recommended at least once for all adults. For pregnant patients, recommended with each pregnancy.   Shingles Vaccine (Shingrix) - COST NOT COVERED BY MEDICARE PART B  2 shot series recommended in those 19 years and older who have or will have weakened immune systems or those 50 years and older     Health Maintenance Due:      Topic Date Due   • Colorectal Cancer Screening  10/05/2030   • Hepatitis C Screening  Completed     Immunizations Due:      Topic Date Due   • Pneumococcal Vaccine: 65+ Years (1 of 2 - PCV) Never done   • Influenza Vaccine (1) 09/01/2024   • COVID-19 Vaccine (1 - 2024-25 season) Never done     Advance Directives   What are advance directives?  Advance directives are legal documents that state your wishes and plans for medical care. These plans are made ahead of time in case you lose your ability to make decisions for yourself. Advance directives can apply to any medical decision, such as the treatments you want, and if you want to donate organs.   What are the types of advance directives?  There are many types of advance directives, and each state has rules about how to use them. You  may choose a combination of any of the following:  Living will:  This is a written record of the treatment you want. You can also choose which treatments you do not want, which to limit, and which to stop at a certain time. This includes surgery, medicine, IV fluid, and tube feedings.   Durable power of  for healthcare (DPAHC):  This is a written record that states who you want to make healthcare choices for you when you are unable to make them for yourself. This person, called a proxy, is usually a family member or a friend. You may choose more than 1 proxy.  Do not resuscitate (DNR) order:  A DNR order is used in case your heart stops beating or you stop breathing. It is a request not to have certain forms of treatment, such as CPR. A DNR order may be included in other types of advance directives.  Medical directive:  This covers the care that you want if you are in a coma, near death, or unable to make decisions for yourself. You can list the treatments you want for each condition. Treatment may include pain medicine, surgery, blood transfusions, dialysis, IV or tube feedings, and a ventilator (breathing machine).  Values history:  This document has questions about your views, beliefs, and how you feel and think about life. This information can help others choose the care that you would choose.  Why are advance directives important?  An advance directive helps you control your care. Although spoken wishes may be used, it is better to have your wishes written down. Spoken wishes can be misunderstood, or not followed. Treatments may be given even if you do not want them. An advance directive may make it easier for your family to make difficult choices about your care.   Cigarette Smoking and Your Health   Risks to your health if you smoke:  Nicotine and other chemicals found in tobacco damage every cell in your body. Even if you are a light smoker, you have an increased risk for cancer, heart disease, and  lung disease. If you are pregnant or have diabetes, smoking increases your risk for complications.   Benefits to your health if you stop smoking:   You decrease respiratory symptoms such as coughing, wheezing, and shortness of breath.   You reduce your risk for cancers of the lung, mouth, throat, kidney, bladder, pancreas, stomach, and cervix. If you already have cancer, you increase the benefits of chemotherapy. You also reduce your risk for cancer returning or a second cancer from developing.   You reduce your risk for heart disease, blood clots, heart attack, and stroke.   You reduce your risk for lung infections, and diseases such as pneumonia, asthma, chronic bronchitis, and emphysema.  Your circulation improves. More oxygen can be delivered to your body. If you have diabetes, you lower your risk for complications, such as kidney, artery, and eye diseases. You also lower your risk for nerve damage. Nerve damage can lead to amputations, poor vision, and blindness.  You improve your body's ability to heal and to fight infections.  For more information and support to stop smoking:   Smokefree.gov  Phone: 8- 135 - 684-5426  Web Address: www.[place].gov     © Copyright [employer] 2018 Information is for End User's use only and may not be sold, redistributed or otherwise used for commercial purposes. All illustrations and images included in CareNotes® are the copyrighted property of [employer]M., Inc. or [employer]

## 2025-01-16 ENCOUNTER — TELEPHONE (OUTPATIENT)
Dept: INTERNAL MEDICINE CLINIC | Facility: CLINIC | Age: 70
End: 2025-01-16

## 2025-01-16 DIAGNOSIS — F17.210 CIGARETTE NICOTINE DEPENDENCE WITHOUT COMPLICATION: Primary | ICD-10-CM

## 2025-01-16 RX ORDER — NICOTINE 21 MG/24HR
1 PATCH, TRANSDERMAL 24 HOURS TRANSDERMAL EVERY 24 HOURS
Qty: 28 PATCH | Refills: 1 | Status: SHIPPED | OUTPATIENT
Start: 2025-01-16

## 2025-01-16 NOTE — TELEPHONE ENCOUNTER
Patient called the RX Refill Line. Message is being forwarded to the office.     Patient is requesting the nicotine patch discussed during yesterdays appointment to be sent to [facility] in [place], [place]     Please contact patient at [phone]

## 2025-01-30 ENCOUNTER — VBI (OUTPATIENT)
Dept: ADMINISTRATIVE | Facility: OTHER | Age: 70
End: 2025-01-30

## 2025-01-30 NOTE — TELEPHONE ENCOUNTER
01/30/25 1:03 PM     Chart reviewed for  ; nothing is submitted to the patient's insurance at this time.     Mishel Lagos   [facility]

## 2025-01-31 DIAGNOSIS — N40.1 BPH ASSOCIATED WITH NOCTURIA: ICD-10-CM

## 2025-01-31 DIAGNOSIS — R35.1 BPH ASSOCIATED WITH NOCTURIA: ICD-10-CM

## 2025-01-31 RX ORDER — TAMSULOSIN HYDROCHLORIDE 0.4 MG/1
0.4 CAPSULE ORAL
Qty: 90 CAPSULE | Refills: 0 | Status: SHIPPED | OUTPATIENT
Start: 2025-01-31

## 2025-03-21 DIAGNOSIS — N40.0 BENIGN PROSTATIC HYPERPLASIA, UNSPECIFIED WHETHER LOWER URINARY TRACT SYMPTOMS PRESENT: ICD-10-CM

## 2025-03-21 RX ORDER — FINASTERIDE 5 MG/1
5 TABLET, FILM COATED ORAL DAILY
Qty: 90 TABLET | Refills: 1 | Status: SHIPPED | OUTPATIENT
Start: 2025-03-21

## 2025-03-21 NOTE — TELEPHONE ENCOUNTER
Patient called to request a refill for their finasteride (PROSCAR) 5 mg tablet  advised a refill was requested on 3/21/25 and is pending approval. Patient verbalized understanding and is in agreement.     Does the patient have enough for 3 days?   [] Yes   [x] No - Send as HP to POD

## 2025-05-04 DIAGNOSIS — N40.1 BPH ASSOCIATED WITH NOCTURIA: ICD-10-CM

## 2025-05-04 DIAGNOSIS — R35.1 BPH ASSOCIATED WITH NOCTURIA: ICD-10-CM

## 2025-05-05 RX ORDER — TAMSULOSIN HYDROCHLORIDE 0.4 MG/1
0.4 CAPSULE ORAL
Qty: 90 CAPSULE | Refills: 1 | Status: SHIPPED | OUTPATIENT
Start: 2025-05-05

## 2025-07-24 ENCOUNTER — TELEPHONE (OUTPATIENT)
Dept: INTERNAL MEDICINE CLINIC | Facility: CLINIC | Age: 70
End: 2025-07-24

## 2025-07-24 DIAGNOSIS — F17.210 CIGARETTE NICOTINE DEPENDENCE WITHOUT COMPLICATION: ICD-10-CM

## 2025-07-24 RX ORDER — NICOTINE 21 MG/24HR
1 PATCH, TRANSDERMAL 24 HOURS TRANSDERMAL EVERY 24 HOURS
Qty: 28 PATCH | Refills: 1 | Status: SHIPPED | OUTPATIENT
Start: 2025-07-24

## 2025-07-24 NOTE — TELEPHONE ENCOUNTER
Patient called the RX Refill Line. Message is being forwarded to the office.     Patient is requesting the higher dose in nicotine patch 21 mg, he was given the middle dose of 14 mg. But was told since he is heavy smoker he should start with the 21 mg. If this can be sent asap since he wants to start on 8/1/25. Thank you    Pharmacy: [id number] - [place], [place] - [address] [phone]     Please contact patient at [phone]